# Patient Record
Sex: FEMALE | Race: WHITE | NOT HISPANIC OR LATINO | URBAN - METROPOLITAN AREA
[De-identification: names, ages, dates, MRNs, and addresses within clinical notes are randomized per-mention and may not be internally consistent; named-entity substitution may affect disease eponyms.]

---

## 2018-08-17 ENCOUNTER — HOSPITAL ENCOUNTER (EMERGENCY)
Facility: CLINIC | Age: 58
Discharge: HOME | End: 2018-08-18
Attending: EMERGENCY MEDICINE | Admitting: EMERGENCY MEDICINE
Payer: COMMERCIAL

## 2018-08-17 VITALS
DIASTOLIC BLOOD PRESSURE: 79 MMHG | SYSTOLIC BLOOD PRESSURE: 134 MMHG | BODY MASS INDEX: 32.82 KG/M2 | WEIGHT: 197 LBS | HEIGHT: 65 IN | RESPIRATION RATE: 18 BRPM | TEMPERATURE: 99.7 F | OXYGEN SATURATION: 96 %

## 2018-08-17 DIAGNOSIS — R11.2 NAUSEA AND VOMITING, INTRACTABILITY OF VOMITING NOT SPECIFIED, UNSPECIFIED VOMITING TYPE: ICD-10-CM

## 2018-08-17 DIAGNOSIS — R73.9 HYPERGLYCEMIA: ICD-10-CM

## 2018-08-17 DIAGNOSIS — L03.116 CELLULITIS OF LEFT FOOT: ICD-10-CM

## 2018-08-17 PROCEDURE — 99284 EMERGENCY DEPT VISIT MOD MDM: CPT | Mod: 25

## 2018-08-17 PROCEDURE — 96374 THER/PROPH/DIAG INJ IV PUSH: CPT

## 2018-08-17 PROCEDURE — 96361 HYDRATE IV INFUSION ADD-ON: CPT

## 2018-08-17 RX ORDER — ONDANSETRON 2 MG/ML
4 INJECTION INTRAMUSCULAR; INTRAVENOUS EVERY 30 MIN PRN
Status: DISCONTINUED | OUTPATIENT
Start: 2018-08-17 | End: 2018-08-18 | Stop reason: HOSPADM

## 2018-08-17 ASSESSMENT — ENCOUNTER SYMPTOMS
FEVER: 1
NAUSEA: 1
VOMITING: 0
WOUND: 1

## 2018-08-17 NOTE — ED AVS SNAPSHOT
Emergency Department    [address] [facility] [address]    Phone:  [phone]    Fax:  [phone]                                       Zaira Ahn   MRN: [id number]    Department:   Emergency Department   Date of Visit:  8/17/2018           Patient Information     Date Of Birth          1960        Your diagnoses for this visit were:     Cellulitis of left foot     Nausea and vomiting, intractability of vomiting not specified, unspecified vomiting type     Hyperglycemia        You were seen by Alie Cuenca MD.      Follow-up Information     Follow up with your doctor.        Follow up with  Emergency Department.    Specialty:  EMERGENCY MEDICINE    Why:  If symptoms worsen    Contact information:    [address] [facility] [address] [phone]        Discharge Instructions         Discharge Instructions for Cellulitis  You have been diagnosed with cellulitis. This is an infection in the deepest layer of the skin. In some cases, the infection also affects the muscle. Cellulitis is caused by bacteria. The bacteria can enter the body through broken skin. This can happen with a cut, scratch, animal bite, or an insect bite that has been scratched. You may have been treated in the hospital with antibiotics and fluids. You will likely be given a prescription for antibiotics to take at home. This sheet will help you take care of yourself at home.  Home care  When you are home:    Take the prescribed antibiotic medicine you are given as directed until it is gone. Take it even if you feel better. It treats the infection and stops it from returning. Not taking all the medicine can make future infections hard to treat.    Keep the infected area clean.    When possible, raise the infected area above the level of your heart. This helps keep swelling down.    Talk with your healthcare provider if you are in pain. Ask what kind of over-the-counter medicine you can  take for pain.    Apply clean bandages as advised.    Take your temperature once a day for a week.    Wash your hands often to prevent spreading the infection.  In the future, wash your hands before and after you touch cuts, scratches, or bandages. This will help prevent infection.   When to call your healthcare provider  Call your healthcare provider immediately if you have any of the following:    Difficulty or pain when moving the joints above or below the infected area    Discharge or pus draining from the area    Fever of 100.4 F (38 C) or higher, or as directed by your healthcare provider    Pain that gets worse in or around the infected     Redness that gets worse in or around the infected area, particularly if the area of redness expands to a wider area    Shaking chills    Swelling of the infected area    Vomiting   Date Last Reviewed: 8/1/2016 2000-2017 [employer]. [address] [place] [address]. All rights reserved. This information is not intended as a substitute for professional medical care. Always follow your healthcare professional's instructions.          Diabetes with High Blood Sugar  You have been treated for high blood sugar (hyperglycemia). This may be because of an infection or other illness, eating too many sweets or starches, or not taking enough insulin.  Home care  Monitor and write down your blood sugar level at least twice a day. Do this before breakfast and before dinner. If you take insulin, record your routine insulin dose as well. Also record any additional doses required based on your sliding scale. Do this for the next 3 to 5 days.  High blood sugar may cause symptoms that you can learn to recognize, such as:    Frequent urination    Thirst    Dizziness    Headache    Shortness of breath    Breath that smells fruity    Nausea or vomiting    Abdominal pain    Drowsiness or loss of consciousness  If you have high-blood-sugar symptoms, use a blood or urine  "test to find out what your blood sugar level is. If it is above your usual range, use the \"sliding scale\" regular insulin dose prescribed by your healthcare provider. If you were not given a range for your insulin dose, contact your healthcare provider for more advice.  Follow-up care  Follow up with your healthcare provider, or as advised. You may need to meet with your healthcare provider in the next week. You will likely review your blood sugar records together. You may also talk to your provider about adjusting your dose of insulin or other medicine for blood sugar.  When to seek medical advice  Call your healthcare provider right away if these occur:    High blood sugar symptoms (Symptoms are described above.)    Blood sugar over 300 mg/dl If you can t reach your healthcare provider, go to a hospital emergency room or urgent care center  Date Last Reviewed: 6/1/2016 2000-2017 [employer]. [address] [place] [address]. All rights reserved. This information is not intended as a substitute for professional medical care. Always follow your healthcare professional's instructions.           * VOMITING [6yr-Adult]  Vomiting is a common symptom that may be due to different causes. These include gastroenteritis (\"stomach-flu\"), food poisoning and gastritis. There are other more serious causes of vomiting which may be hard to diagnose early in the illness. Therefore, it is important to watch for the warning signs listed below.  The main danger from repeated vomiting is \"dehydration\". This is due to excess loss of water and minerals from the body. When this occurs, body fluids must be replaced.`  HOME CARE:      If symptoms are severe, rest at home for the next 24 hours.    You may use acetaminophen (Tylenol) 650-1000 mg every 6 hours to control fever, unless another medicine was prescribed. [ NOTE : If you have chronic liver disease, talk with your doctor before using acetaminophen.] " (Aspirin should never be used in anyone under 18 years of age who is ill with a fever. It may cause severe liver damage.)    Avoid tobacco and alcohol use, which may worsen your symptoms.    If medicines for vomiting were prescribed, take as directed.  DURING THE FIRST 12-24 HOURS follow the diet below. Try to take frequent small sips even if you vomit occasionally:    FRUIT JUICES: Apple, grape juice, clear fruit drinks, electrolyte replacement and sports drinks.    BEVERAGES: Sport drinks such as Gatorade, soft drinks without caffeine; mineral water (plain or flavored), decaffeinated tea and coffee.    SOUPS: Clear broth, consommé and bouillon    DESSERTS: Plain gelatin (Jell-O), popsicles and fruit juice bars.  DURING THE NEXT 24 HOURS you may add the following to the above:    Hot cereal, plain toast, bread, rolls, crackers    Plain noodles, rice, mashed potatoes, chicken noodle or rice soup    Unsweetened canned fruit (avoid pineapple), bananas    Avoid dairy products    Limit caffeine and chocolate. No spices or seasonings except salt.  DURING THE NEXT 24 HOURS  Slowly go back to a normal diet, as you feel better and your symptoms lessen.  FOLLOW UP with your doctor as advised if you are not improving over the next 2-3 days.  GET PROMPT MEDICAL ATTENTION if any of the following occur:    Constant abdominal pain that stays in the same spot or gets worse    Continued vomiting (unable to keep liquids down) for 24 hours    Frequent diarrhea (more than 5 times a day); blood (red or black color) in diarrhea    No urine output for 12 hours or extreme thirst    Weakness, dizziness or fainting    Unusually drowsy or confused    Fever over 101.0  F (38.3  C) for more than 3 days    Yellow color of the eyes or skin    1450-0410 [employer]. [address] [place] [address], [place] [address]. All rights reserved. This information is not intended as a substitute for professional medical care. Always follow your  healthcare professional's instructions.  This information has been modified by your health care provider with permission from the publisher.      24 Hour Appointment Hotline       To make an appointment at any [facility], call [phone] ([phone]). If you don't have a family doctor or clinic, we will help you find one. [place] clinics are conveniently located to serve the needs of you and your family.             Review of your medicines      START taking        Dose / Directions Last dose taken    cephALEXin 500 MG capsule   Commonly known as:  KEFLEX   Dose:  500 mg   Quantity:  28 capsule        Take 1 capsule (500 mg) by mouth 4 times daily for 7 days   Refills:  0        ondansetron 4 MG ODT tab   Commonly known as:  ZOFRAN ODT   Dose:  4 mg   Quantity:  10 tablet        Take 1 tablet (4 mg) by mouth every 8 hours as needed   Refills:  0          Our records show that you are taking the medicines listed below. If these are incorrect, please call your family doctor or clinic.        Dose / Directions Last dose taken    atenolol-chlorthalidone 50-25 MG per tablet   Commonly known as:  TENORETIC   Dose:  1 tablet        Take 1 tablet by mouth daily   Refills:  0        buPROPion 150 MG 24 hr tablet   Commonly known as:  WELLBUTRIN XL   Dose:  150 mg   Quantity:  30 tablet        Take 1 tablet (150 mg) by mouth every morning   Refills:  1        GABAPENTIN PO        Refills:  0        METFORMIN HCL PO        Refills:  0                Prescriptions were sent or printed at these locations (2 Prescriptions)                   Other Prescriptions                Printed at Department/Unit printer (2 of 2)         cephALEXin (KEFLEX) 500 MG capsule               ondansetron (ZOFRAN ODT) 4 MG ODT tab                Procedures and tests performed during your visit     CBC with platelets differential    Comprehensive metabolic panel    Foot XR, G/E 3 views, left    Lactic acid whole blood    Lipase       Orders Needing Specimen Collection     None      Pending Results     Date and Time Order Name Status Description    8/17/2018 2357 Foot XR, G/E 3 views, left Preliminary             Pending Culture Results     No orders found for last 3 day(s).            Pending Results Instructions     If you had any lab results that were not finalized at the time of your Discharge, you can call the ED Lab Result RN at [phone]. You will be contacted by this team for any positive Lab results or changes in treatment. The nurses are available 7 days a week from 10A to 6:30P.  You can leave a message 24 hours per day and they will return your call.        Test Results From Your Hospital Stay        8/18/2018 12:17 AM      Component Results     Component Value Ref Range & Units Status    WBC 10.3 4.0 - 11.0 10e9/L Final    RBC Count 4.69 3.8 - 5.2 10e12/L Final    Hemoglobin 15.1 11.7 - 15.7 g/dL Final    Hematocrit 43.2 35.0 - 47.0 % Final    MCV 92 78 - 100 fl Final    MCH 32.2 26.5 - 33.0 pg Final    MCHC 35.0 31.5 - 36.5 g/dL Final    RDW 12.7 10.0 - 15.0 % Final    Platelet Count 202 150 - 450 10e9/L Final    Diff Method Automated Method  Final    % Neutrophils 76.1 % Final    % Lymphocytes 15.2 % Final    % Monocytes 6.4 % Final    % Eosinophils 1.8 % Final    % Basophils 0.3 % Final    % Immature Granulocytes 0.2 % Final    Nucleated RBCs 0 0 /100 Final    Absolute Neutrophil 7.9 1.6 - 8.3 10e9/L Final    Absolute Lymphocytes 1.6 0.8 - 5.3 10e9/L Final    Absolute Monocytes 0.7 0.0 - 1.3 10e9/L Final    Absolute Eosinophils 0.2 0.0 - 0.7 10e9/L Final    Absolute Basophils 0.0 0.0 - 0.2 10e9/L Final    Abs Immature Granulocytes 0.0 0 - 0.4 10e9/L Final    Absolute Nucleated RBC 0.0  Final         8/18/2018 12:38 AM      Component Results     Component Value Ref Range & Units Status    Sodium 137 133 - 144 mmol/L Final    Potassium 3.2 (L) 3.4 - 5.3 mmol/L Final    Chloride 98 94 - 109 mmol/L Final    Carbon Dioxide 30 20 -  32 mmol/L Final    Anion Gap 9 3 - 14 mmol/L Final    Glucose 195 (H) 70 - 99 mg/dL Final    Urea Nitrogen 14 7 - 30 mg/dL Final    Creatinine 0.81 0.52 - 1.04 mg/dL Final    GFR Estimate 72 >60 mL/min/1.7m2 Final    Non  GFR Calc    GFR Estimate If Black 87 >60 mL/min/1.7m2 Final    African American GFR Calc    Calcium 8.6 8.5 - 10.1 mg/dL Final    Bilirubin Total 0.7 0.2 - 1.3 mg/dL Final    Albumin 3.4 3.4 - 5.0 g/dL Final    Protein Total 7.7 6.8 - 8.8 g/dL Final    Alkaline Phosphatase 91 40 - 150 U/L Final    ALT 65 (H) 0 - 50 U/L Final    AST 32 0 - 45 U/L Final         8/18/2018 12:37 AM      Component Results     Component Value Ref Range & Units Status    Lipase 59 (L) 73 - 393 U/L Final         8/18/2018 12:16 AM      Component Results     Component Value Ref Range & Units Status    Lactic Acid 1.7 0.7 - 2.0 mmol/L Final         8/18/2018 12:28 AM      Narrative     LEFT FOOT 3 VIEWS   8/18/2018 12:22 AM     HISTORY: Pain after foreign body removal.    COMPARISON: None.        Impression     IMPRESSION:   1. No radiopaque foreign object is visualized within the soft tissues  of the left foot.  2. No visualized acute fracture, malalignment or other acute osseous  abnormality of the left foot.  3. Surgical fusion hardware present bridging the cuboid-calcaneal  joint.  4. Mild degenerative changes in the left first metatarsophalangeal  joint.                Clinical Quality Measure: Blood Pressure Screening     Your blood pressure was checked while you were in the emergency department today. The last reading we obtained was  BP: 134/79 . Please read the guidelines below about what these numbers mean and what you should do about them.  If your systolic blood pressure (the top number) is less than 120 and your diastolic blood pressure (the bottom number) is less than 80, then your blood pressure is normal. There is nothing more that you need to do about it.  If your systolic blood pressure (the  "top number) is 120-139 or your diastolic blood pressure (the bottom number) is 80-89, your blood pressure may be higher than it should be. You should have your blood pressure rechecked within a year by a primary care provider.  If your systolic blood pressure (the top number) is 140 or greater or your diastolic blood pressure (the bottom number) is 90 or greater, you may have high blood pressure. High blood pressure is treatable, but if left untreated over time it can put you at risk for heart attack, stroke, or kidney failure. You should have your blood pressure rechecked by a primary care provider within the next 4 weeks.  If your provider in the emergency department today gave you specific instructions to follow-up with your doctor or provider even sooner than that, you should follow that instruction and not wait for up to 4 weeks for your follow-up visit.        Thank you for choosing [facility]       Thank you for choosing [facility] for your care. Our goal is always to provide you with excellent care. Hearing back from our patients is one way we can continue to improve our services. Please take a few minutes to complete the written survey that you may receive in the mail after you visit with us. Thank you!        [employer] Information     [employer] lets you send messages to your doctor, view your test results, renew your prescriptions, schedule appointments and more. To sign up, go to [email or web address] . Click on \"Log in\" on the left side of the screen, which will take you to the Welcome page. Then click on \"Sign up Now\" on the right side of the page.     You will be asked to enter the access code listed below, as well as some personal information. Please follow the directions to create your username and password.     Your access code is: [email or web address]  Expires: 2018 12:57 AM     Your access code will  in 90 days. If you need help or a new code, please call your [facility] clinic or [phone].      "   Care EveryWhere ID     This is your Care EveryWhere ID. This could be used by other organizations to access your [place] medical records  [id number]        Equal Access to Services     LUIS BROWN : Berta Gomez, arpit vuong, elliot wood, tamanna herrera. So [facility] [place] [phone].    ATENCIÓN: Si habla español, tiene a cortés disposición servicios gratuitos de asistencia lingüística. Llame al [phone].    We comply with applicable federal civil rights laws and [place] laws. We do not discriminate on the basis of race, color, national origin, age, disability, sex, sexual orientation, or gender identity.            After Visit Summary       This is your record. Keep this with you and show to your community pharmacist(s) and doctor(s) at your next visit.

## 2018-08-18 ENCOUNTER — APPOINTMENT (OUTPATIENT)
Dept: GENERAL RADIOLOGY | Facility: CLINIC | Age: 58
End: 2018-08-18
Attending: EMERGENCY MEDICINE
Payer: COMMERCIAL

## 2018-08-18 LAB
ALBUMIN SERPL-MCNC: 3.4 G/DL (ref 3.4–5)
ALP SERPL-CCNC: 91 U/L (ref 40–150)
ALT SERPL W P-5'-P-CCNC: 65 U/L (ref 0–50)
ANION GAP SERPL CALCULATED.3IONS-SCNC: 9 MMOL/L (ref 3–14)
AST SERPL W P-5'-P-CCNC: 32 U/L (ref 0–45)
BASOPHILS # BLD AUTO: 0 10E9/L (ref 0–0.2)
BASOPHILS NFR BLD AUTO: 0.3 %
BILIRUB SERPL-MCNC: 0.7 MG/DL (ref 0.2–1.3)
BUN SERPL-MCNC: 14 MG/DL (ref 7–30)
CALCIUM SERPL-MCNC: 8.6 MG/DL (ref 8.5–10.1)
CHLORIDE SERPL-SCNC: 98 MMOL/L (ref 94–109)
CO2 SERPL-SCNC: 30 MMOL/L (ref 20–32)
CREAT SERPL-MCNC: 0.81 MG/DL (ref 0.52–1.04)
DIFFERENTIAL METHOD BLD: NORMAL
EOSINOPHIL # BLD AUTO: 0.2 10E9/L (ref 0–0.7)
EOSINOPHIL NFR BLD AUTO: 1.8 %
ERYTHROCYTE [DISTWIDTH] IN BLOOD BY AUTOMATED COUNT: 12.7 % (ref 10–15)
GFR SERPL CREATININE-BSD FRML MDRD: 72 ML/MIN/1.7M2
GLUCOSE SERPL-MCNC: 195 MG/DL (ref 70–99)
HCT VFR BLD AUTO: 43.2 % (ref 35–47)
HGB BLD-MCNC: 15.1 G/DL (ref 11.7–15.7)
IMM GRANULOCYTES # BLD: 0 10E9/L (ref 0–0.4)
IMM GRANULOCYTES NFR BLD: 0.2 %
LACTATE BLD-SCNC: 1.7 MMOL/L (ref 0.7–2)
LIPASE SERPL-CCNC: 59 U/L (ref 73–393)
LYMPHOCYTES # BLD AUTO: 1.6 10E9/L (ref 0.8–5.3)
LYMPHOCYTES NFR BLD AUTO: 15.2 %
MCH RBC QN AUTO: 32.2 PG (ref 26.5–33)
MCHC RBC AUTO-ENTMCNC: 35 G/DL (ref 31.5–36.5)
MCV RBC AUTO: 92 FL (ref 78–100)
MONOCYTES # BLD AUTO: 0.7 10E9/L (ref 0–1.3)
MONOCYTES NFR BLD AUTO: 6.4 %
NEUTROPHILS # BLD AUTO: 7.9 10E9/L (ref 1.6–8.3)
NEUTROPHILS NFR BLD AUTO: 76.1 %
NRBC # BLD AUTO: 0 10*3/UL
NRBC BLD AUTO-RTO: 0 /100
PLATELET # BLD AUTO: 202 10E9/L (ref 150–450)
POTASSIUM SERPL-SCNC: 3.2 MMOL/L (ref 3.4–5.3)
PROT SERPL-MCNC: 7.7 G/DL (ref 6.8–8.8)
RBC # BLD AUTO: 4.69 10E12/L (ref 3.8–5.2)
SODIUM SERPL-SCNC: 137 MMOL/L (ref 133–144)
WBC # BLD AUTO: 10.3 10E9/L (ref 4–11)

## 2018-08-18 PROCEDURE — 83690 ASSAY OF LIPASE: CPT | Performed by: EMERGENCY MEDICINE

## 2018-08-18 PROCEDURE — 96374 THER/PROPH/DIAG INJ IV PUSH: CPT

## 2018-08-18 PROCEDURE — 73630 X-RAY EXAM OF FOOT: CPT | Mod: LT

## 2018-08-18 PROCEDURE — 85025 COMPLETE CBC W/AUTO DIFF WBC: CPT | Performed by: EMERGENCY MEDICINE

## 2018-08-18 PROCEDURE — 80053 COMPREHEN METABOLIC PANEL: CPT | Performed by: EMERGENCY MEDICINE

## 2018-08-18 PROCEDURE — 96361 HYDRATE IV INFUSION ADD-ON: CPT

## 2018-08-18 PROCEDURE — 25000128 H RX IP 250 OP 636: Performed by: EMERGENCY MEDICINE

## 2018-08-18 PROCEDURE — 83605 ASSAY OF LACTIC ACID: CPT | Performed by: EMERGENCY MEDICINE

## 2018-08-18 RX ORDER — CEPHALEXIN 500 MG/1
500 CAPSULE ORAL 4 TIMES DAILY
Qty: 28 CAPSULE | Refills: 0 | Status: SHIPPED | OUTPATIENT
Start: 2018-08-18 | End: 2018-08-25

## 2018-08-18 RX ORDER — ONDANSETRON 4 MG/1
4 TABLET, ORALLY DISINTEGRATING ORAL EVERY 8 HOURS PRN
Qty: 10 TABLET | Refills: 0 | Status: SHIPPED | OUTPATIENT
Start: 2018-08-18 | End: 2018-08-21

## 2018-08-18 RX ADMIN — ONDANSETRON 4 MG: 2 INJECTION INTRAMUSCULAR; INTRAVENOUS at 00:10

## 2018-08-18 RX ADMIN — SODIUM CHLORIDE 1000 ML: 9 INJECTION, SOLUTION INTRAVENOUS at 00:08

## 2018-08-18 NOTE — ED PROVIDER NOTES
"  History     Chief Complaint:  Wound Check    HPI   Zaira Ahn is a 57 year old female with a history of diabetes and hypertension who presents to the ED for a wound check. The patient reports that she had a sliver in her left foot, and removed it with a sewing needle and tweezers 5 days ago. Since the then, she has had increased pain to the area, and developed some nausea throughout the day today. She also had a fever of 101, so she presented to the ED. Here in the ED, she denies any vomiting. She additionally notes that her blood sugars have been high recently, in the 200's. She does note that she has had exposure to her children who have been ill with similar symptoms.    Allergies:  Tylenol with Codeine    Medications:    Tenoretic  Gabapentin  Metformin  Wellbutrin    Past Medical History:    Arthritis  Depression  Diabetes  HTN    Past Surgical History:    Orthopedic surgery    Family History:    Mother: diabetes, HTN  Father: CAD  Brother: CAD  Son: asthma    Social History:  Marital Status:   [2]  Current Smoker  Alcohol use: yes     Review of Systems   Constitutional: Positive for fever.   Gastrointestinal: Positive for nausea. Negative for vomiting.   Skin: Positive for wound.   All other systems reviewed and are negative.    Physical Exam     Patient Vitals for the past 24 hrs:   BP Temp Temp src Heart Rate Resp SpO2 Height Weight   08/17/18 2307 134/79 99.7  F (37.6  C) Oral 88 18 96 % 1.651 m (5' 5\") 89.4 kg (197 lb)     Physical Exam  General: Patient is alert and normal appearing.  HEENT: Head atraumatic    Eyes: pupils equal and reactive. Conjunctiva clear   Nares: patent   Oropharynx: no lesions, uvula midline, no palatal draping, normal voice, no trismus  Neck: Supple without lymphadenopathy, no meningismus  Chest: Heart regular rate and rhythm.   Lungs: Equal clear to auscultation with no wheeze or rales  Abdomen: Soft, non tender, nondistended, normal bowel sounds  Back: No " costovertebral angle tenderness, no midline C, T or L spine tenderness  Neuro: Grossly nonfocal, normal speech, strength equal bilaterally, CN 2-12 intact  Extremities: No deformities, equal radial and DP pulses. No clubbing, cyanosis.  No edema  Skin: Warm and dry.  Anterior aspect of left foot with small wound at the site where she removed foreign body no palpable foreign body remains and no ulcer is noted.  There is surrounding erythema no drainage.  Toes are warm and well-perfused she has 2+ DP pulses.  Compartments on that leg are soft.      Emergency Department Course   Imaging:  Radiographic findings were communicated with the patient who voiced understanding of the findings.  XR Foot 3 views, left:   1. No radiopaque foreign object is visualized within the soft tissues of the left foot.  2. No visualized acute fracture, malalignment or other acute osseous abnormality of the left foot.  3. Surgical fusion hardware present bridging the cuboid-calcaneal joint.  4. Mild degenerative changes in the left first metatarsophalangeal joint, as per radiology.     Laboratory:  CBC: WBC: 10.3, HGB: 15.1, PLT: 202  CMP: Glucose 195 (H), Potassium 3.2 (L), ALT 65 (H), o/w WNL (Creatinine: 0.81)    Lipase: 59 (L)    0007 Lactic acid: 1.7    Interventions:  0008 NS 1L IV  0010 Zofran, 4 mg, IV injection    Emergency Department Course:  Nursing notes and vitals reviewed. (7485) I performed an exam of the patient as documented above.     IV inserted. Medicine administered as documented above. Blood drawn. This was sent to the lab for further testing, results above.    The patient was sent for a Foot XR while in the emergency department, findings above.     (9593) I rechecked the patient and discussed the results of her workup thus far.     Findings and plan explained to the Patient. Patient discharged home with instructions regarding supportive care, medications, and reasons to return. The importance of close follow-up was  reviewed. The patient was prescribed Keflex & Zofran.    I personally reviewed the laboratory results with the Patient and answered all related questions prior to discharge.     Impression & Plan      Medical Decision Making:  Zaira Ahn is a 57 year old female pain of her left foot following foreign body removal 3 days ago as well as nausea and vomiting.  Patient had exposure to 2 grandchildren with viral gastroenteritis and started having nausea very severely today.  Her last granddaughter became ill 2 days ago.  She denies diarrhea or abdominal pain.  Patient also had mildly elevated blood sugars.  She has a history of type 2 diabetes and has been taking her medications.  There is no evidence on x-ray to suggest necrotizing infection nor osteomyelitis.  There is no retained foreign body that is identified.  There is no ulcer of her foot at this time but some mild surrounding cellulitis therefore she will be placed on Keflex.  She encouraged to use warm baths and keep it clean and dry otherwise.  I think her nausea and vomiting is likely related to viral gastroenteritis. she felt much improved after Zofran here.  There is no evidence for sepsis including a normal white blood cell count, normal lactic acid and no signs of acidosis her BMP to suggest DKA.  Patient's recommended to return if she has fevers increased nausea and vomiting over the next 2 days ulceration develops, drainage from the wound, spreading redness of her foot.  Patient was agreeable with the plan and all questions and concerns addressed.    Diagnosis:    ICD-10-CM    1. Cellulitis of left foot L03.116    2. Nausea and vomiting, intractability of vomiting not specified, unspecified vomiting type R11.2    3. Hyperglycemia R73.9      Disposition:  discharged to home    Discharge Medications:  New Prescriptions    CEPHALEXIN (KEFLEX) 500 MG CAPSULE    Take 1 capsule (500 mg) by mouth 4 times daily for 7 days    ONDANSETRON (ZOFRAN ODT) 4 MG  ODT TAB    Take 1 tablet (4 mg) by mouth every 8 hours as needed     Scribe Disclosure:  I, Sussy Rivera, am serving as a scribe on 8/17/2018 at 11:40 PM to personally document services performed by Alie Cuenca MD based on my observations and the provider's statements to me.     Sussy Rivera  8/17/2018    EMERGENCY DEPARTMENT       Alie Cuenca MD  08/18/18 0137

## 2018-08-18 NOTE — DISCHARGE INSTRUCTIONS
Discharge Instructions for Cellulitis  You have been diagnosed with cellulitis. This is an infection in the deepest layer of the skin. In some cases, the infection also affects the muscle. Cellulitis is caused by bacteria. The bacteria can enter the body through broken skin. This can happen with a cut, scratch, animal bite, or an insect bite that has been scratched. You may have been treated in the hospital with antibiotics and fluids. You will likely be given a prescription for antibiotics to take at home. This sheet will help you take care of yourself at home.  Home care  When you are home:    Take the prescribed antibiotic medicine you are given as directed until it is gone. Take it even if you feel better. It treats the infection and stops it from returning. Not taking all the medicine can make future infections hard to treat.    Keep the infected area clean.    When possible, raise the infected area above the level of your heart. This helps keep swelling down.    Talk with your healthcare provider if you are in pain. Ask what kind of over-the-counter medicine you can take for pain.    Apply clean bandages as advised.    Take your temperature once a day for a week.    Wash your hands often to prevent spreading the infection.  In the future, wash your hands before and after you touch cuts, scratches, or bandages. This will help prevent infection.   When to call your healthcare provider  Call your healthcare provider immediately if you have any of the following:    Difficulty or pain when moving the joints above or below the infected area    Discharge or pus draining from the area    Fever of 100.4 F (38 C) or higher, or as directed by your healthcare provider    Pain that gets worse in or around the infected     Redness that gets worse in or around the infected area, particularly if the area of redness expands to a wider area    Shaking chills    Swelling of the infected area    Vomiting   Date Last Reviewed:  "8/1/2016 2000-2017 [employer]. [address] [place] [address], [place] [address]. All rights reserved. This information is not intended as a substitute for professional medical care. Always follow your healthcare professional's instructions.          Diabetes with High Blood Sugar  You have been treated for high blood sugar (hyperglycemia). This may be because of an infection or other illness, eating too many sweets or starches, or not taking enough insulin.  Home care  Monitor and write down your blood sugar level at least twice a day. Do this before breakfast and before dinner. If you take insulin, record your routine insulin dose as well. Also record any additional doses required based on your sliding scale. Do this for the next 3 to 5 days.  High blood sugar may cause symptoms that you can learn to recognize, such as:    Frequent urination    Thirst    Dizziness    Headache    Shortness of breath    Breath that smells fruity    Nausea or vomiting    Abdominal pain    Drowsiness or loss of consciousness  If you have high-blood-sugar symptoms, use a blood or urine test to find out what your blood sugar level is. If it is above your usual range, use the \"sliding scale\" regular insulin dose prescribed by your healthcare provider. If you were not given a range for your insulin dose, contact your healthcare provider for more advice.  Follow-up care  Follow up with your healthcare provider, or as advised. You may need to meet with your healthcare provider in the next week. You will likely review your blood sugar records together. You may also talk to your provider about adjusting your dose of insulin or other medicine for blood sugar.  When to seek medical advice  Call your healthcare provider right away if these occur:    High blood sugar symptoms (Symptoms are described above.)    Blood sugar over 300 mg/dl If you can t reach your healthcare provider, go to a hospital emergency room or urgent care " "center  Date Last Reviewed: 6/1/2016 2000-2017 [employer]. [address] [place] [address], [place] [address]. All rights reserved. This information is not intended as a substitute for professional medical care. Always follow your healthcare professional's instructions.           * VOMITING [6yr-Adult]  Vomiting is a common symptom that may be due to different causes. These include gastroenteritis (\"stomach-flu\"), food poisoning and gastritis. There are other more serious causes of vomiting which may be hard to diagnose early in the illness. Therefore, it is important to watch for the warning signs listed below.  The main danger from repeated vomiting is \"dehydration\". This is due to excess loss of water and minerals from the body. When this occurs, body fluids must be replaced.`  HOME CARE:      If symptoms are severe, rest at home for the next 24 hours.    You may use acetaminophen (Tylenol) 650-1000 mg every 6 hours to control fever, unless another medicine was prescribed. [ NOTE : If you have chronic liver disease, talk with your doctor before using acetaminophen.] (Aspirin should never be used in anyone under 18 years of age who is ill with a fever. It may cause severe liver damage.)    Avoid tobacco and alcohol use, which may worsen your symptoms.    If medicines for vomiting were prescribed, take as directed.  DURING THE FIRST 12-24 HOURS follow the diet below. Try to take frequent small sips even if you vomit occasionally:    FRUIT JUICES: Apple, grape juice, clear fruit drinks, electrolyte replacement and sports drinks.    BEVERAGES: Sport drinks such as Gatorade, soft drinks without caffeine; mineral water (plain or flavored), decaffeinated tea and coffee.    SOUPS: Clear broth, consommé and bouillon    DESSERTS: Plain gelatin (Jell-O), popsicles and fruit juice bars.  DURING THE NEXT 24 HOURS you may add the following to the above:    Hot cereal, plain toast, bread, rolls, crackers    Plain " noodles, rice, mashed potatoes, chicken noodle or rice soup    Unsweetened canned fruit (avoid pineapple), bananas    Avoid dairy products    Limit caffeine and chocolate. No spices or seasonings except salt.  DURING THE NEXT 24 HOURS  Slowly go back to a normal diet, as you feel better and your symptoms lessen.  FOLLOW UP with your doctor as advised if you are not improving over the next 2-3 days.  GET PROMPT MEDICAL ATTENTION if any of the following occur:    Constant abdominal pain that stays in the same spot or gets worse    Continued vomiting (unable to keep liquids down) for 24 hours    Frequent diarrhea (more than 5 times a day); blood (red or black color) in diarrhea    No urine output for 12 hours or extreme thirst    Weakness, dizziness or fainting    Unusually drowsy or confused    Fever over 101.0  F (38.3  C) for more than 3 days    Yellow color of the eyes or skin    7826-4501 [employer]. [address] [place] [address], [place] [address]. All rights reserved. This information is not intended as a substitute for professional medical care. Always follow your healthcare professional's instructions.  This information has been modified by your health care provider with permission from the publisher.

## 2019-04-25 ENCOUNTER — OFFICE VISIT (OUTPATIENT)
Dept: FAMILY MEDICINE | Facility: CLINIC | Age: 59
End: 2019-04-25
Payer: COMMERCIAL

## 2019-04-25 VITALS
OXYGEN SATURATION: 98 % | SYSTOLIC BLOOD PRESSURE: 152 MMHG | DIASTOLIC BLOOD PRESSURE: 102 MMHG | WEIGHT: 192 LBS | HEART RATE: 78 BPM | BODY MASS INDEX: 31.99 KG/M2 | HEIGHT: 65 IN

## 2019-04-25 DIAGNOSIS — F41.8 DEPRESSION WITH ANXIETY: ICD-10-CM

## 2019-04-25 DIAGNOSIS — E11.9 TYPE 2 DIABETES MELLITUS WITHOUT COMPLICATION, WITHOUT LONG-TERM CURRENT USE OF INSULIN (H): Primary | ICD-10-CM

## 2019-04-25 DIAGNOSIS — Z13.9 SCREENING FOR CONDITION: ICD-10-CM

## 2019-04-25 DIAGNOSIS — Z71.6 ENCOUNTER FOR SMOKING CESSATION COUNSELING: ICD-10-CM

## 2019-04-25 DIAGNOSIS — I10 ESSENTIAL HYPERTENSION: ICD-10-CM

## 2019-04-25 DIAGNOSIS — E11.41 DIABETIC MONONEUROPATHY ASSOCIATED WITH TYPE 2 DIABETES MELLITUS (H): ICD-10-CM

## 2019-04-25 LAB
BUN SERPL-MCNC: 10.4 MG/DL (ref 7–19)
CALCIUM SERPL-MCNC: 9.5 MG/DL (ref 8.5–10.1)
CHLORIDE SERPLBLD-SCNC: 102.6 MMOL/L (ref 98–110)
CHOLEST SERPL-MCNC: 231.5 MG/DL (ref 0–200)
CHOLEST/HDLC SERPL: 5.9 {RATIO} (ref 0–5)
CO2 SERPL-SCNC: 31.1 MMOL/L (ref 20–32)
CREAT SERPL-MCNC: 0.5 MG/DL (ref 0.5–1)
GFR SERPL CREATININE-BSD FRML MDRD: >90 ML/MIN/1.7 M2
GLUCOSE SERPL-MCNC: 193 MG'DL (ref 70–99)
HBA1C MFR BLD: 7.8 % (ref 4.1–5.7)
HDLC SERPL-MCNC: 39.1 MG/DL
HIV 1+2 AB+HIV1 P24 AG SERPL QL IA: NONREACTIVE
LDLC SERPL CALC-MCNC: 138 MG/DL (ref 0–129)
POTASSIUM SERPL-SCNC: 4.2 MMOL/DL (ref 3.3–4.5)
SODIUM SERPL-SCNC: 134.2 MMOL/L (ref 132.6–141.4)
TRIGL SERPL-MCNC: 274.4 MG/DL (ref 0–150)
TSH SERPL DL<=0.005 MIU/L-ACNC: 2.4 MU/L (ref 0.4–4)
VLDL CHOLESTEROL: 54.9 MG/DL (ref 7–32)

## 2019-04-25 RX ORDER — SERTRALINE HYDROCHLORIDE 25 MG/1
25 TABLET, FILM COATED ORAL DAILY
Qty: 90 TABLET | Refills: 1 | Status: SHIPPED | OUTPATIENT
Start: 2019-04-25 | End: 2019-12-05

## 2019-04-25 RX ORDER — GABAPENTIN 300 MG/1
300 CAPSULE ORAL 2 TIMES DAILY
Qty: 180 CAPSULE | Refills: 1 | Status: SHIPPED | OUTPATIENT
Start: 2019-04-25 | End: 2019-10-31

## 2019-04-25 RX ORDER — SERTRALINE HYDROCHLORIDE 25 MG/1
25 TABLET, FILM COATED ORAL DAILY
COMMUNITY
Start: 2018-04-11 | End: 2019-04-25

## 2019-04-25 RX ORDER — ATENOLOL AND CHLORTHALIDONE TABLET 50; 25 MG/1; MG/1
1 TABLET ORAL DAILY
Qty: 90 TABLET | Refills: 3 | Status: SHIPPED | OUTPATIENT
Start: 2019-04-25 | End: 2019-12-05

## 2019-04-25 RX ORDER — NICOTINE 21 MG/24HR
1 PATCH, TRANSDERMAL 24 HOURS TRANSDERMAL EVERY 24 HOURS
Qty: 30 PATCH | Refills: 1 | Status: SHIPPED | OUTPATIENT
Start: 2019-04-25 | End: 2019-12-05

## 2019-04-25 RX ORDER — LIRAGLUTIDE 6 MG/ML
1.8 INJECTION SUBCUTANEOUS DAILY
Qty: 1.8 ML | Refills: 1 | Status: SHIPPED | OUTPATIENT
Start: 2019-04-25 | End: 2019-12-05

## 2019-04-25 RX ORDER — ATORVASTATIN CALCIUM 40 MG/1
40 TABLET, FILM COATED ORAL DAILY
Qty: 90 TABLET | Refills: 3 | Status: SHIPPED | OUTPATIENT
Start: 2019-04-25 | End: 2019-12-05

## 2019-04-25 ASSESSMENT — ENCOUNTER SYMPTOMS
DIFFICULTY URINATING: 0
PALPITATIONS: 0
ABDOMINAL DISTENTION: 0
ABDOMINAL PAIN: 0
CONSTIPATION: 0
SHORTNESS OF BREATH: 0
COUGH: 0
SLEEP DISTURBANCE: 0
DYSPHORIC MOOD: 0
DYSURIA: 0
EYES NEGATIVE: 1
DIARRHEA: 1
CHEST TIGHTNESS: 0
POLYDIPSIA: 0
MYALGIAS: 0
FATIGUE: 0
VOMITING: 0
ARTHRALGIAS: 0
NUMBNESS: 0
FEVER: 0
BLOOD IN STOOL: 0
NERVOUS/ANXIOUS: 0
COLOR CHANGE: 0

## 2019-04-25 ASSESSMENT — MIFFLIN-ST. JEOR: SCORE: 1455.75

## 2019-04-26 NOTE — PATIENT INSTRUCTIONS
Here is the plan from today's visit    1. Type 2 diabetes mellitus without complication, without long-term current use of insulin (H)  Please try to send her medication will report the results of the labs shortly.  - Hemoglobin A1c ([facility])  - Lipid Cascade ([facility])  - TSH with free T4 reflex  - Basic Metabolic Panel ([facility])  - atorvastatin (LIPITOR) 40 MG tablet; Take 1 tablet (40 mg) by mouth daily  Dispense: 90 tablet; Refill: 3  - liraglutide (VICTOZA) 18 MG/3ML solution; Inject 1.8 mg Subcutaneous daily Start 0.6 for one week, 1.2 for one week  Dispense: 1.8 mL; Refill: 1    2. Screening for condition    - HIV Antigen Antibody Combo    3. Diabetic mononeuropathy associated with type 2 diabetes mellitus (H)    - gabapentin (NEURONTIN) 300 MG capsule; Take 1 capsule (300 mg) by mouth 2 times daily  Dispense: 180 capsule; Refill: 1    4. Essential hypertension    - atenolol-chlorthalidone (TENORETIC) 50-25 MG tablet; Take 1 tablet by mouth daily  Dispense: 90 tablet; Refill: 3    5. Depression with anxiety  Start taking this at the follow-up with we will see if his thyroid dose with decreasing her dosages.  - sertraline (ZOLOFT) 25 MG tablet; Take 1 tablet (25 mg) by mouth daily  Dispense: 90 tablet; Refill: 1    6. Encounter for smoking cessation counseling  Support here to decide on a quit date and to follow through with that.  There is unsafe to use a nicotine patch and to smoke at the same time.  - nicotine (NICODERM CQ) 14 MG/24HR 24 hr patch; Place 1 patch onto the skin every 24 hours  Dispense: 30 patch; Refill: 1      Please call or return to clinic if your symptoms don't go away.    Follow up plan  Please make a clinic appointment for follow up with me (TORY WINN) in 2-4  weeks for recheck preventive exam..    Thank you for coming to [facility] today.  Lab Testing:  **If you had lab testing today and your results are reassuring or normal they will be mailed to you or sent through [email or web address]  within 7 days.   **If the lab tests need quick action we will call you with the results.  The phone number we will call with results is # [phone] (home) . If this is not the best number please call our clinic and change the number.  Medication Refills:  If you need any refills please call your pharmacy and they will contact us.   If you need to  your refill at a new pharmacy, please contact the new pharmacy directly. The new pharmacy will help you get your medications transferred faster.   Scheduling:  If you have any concerns about today's visit or wish to schedule another appointment please call our office during normal business hours [phone] (8-5:00 M-F)  If a referral was made to a [facility] and you don't get a call from central scheduling please call [phone].  If a Mammogram was ordered for you at [facility] call [phone] to schedule or change your appointment.  If you had an XRay/CT/Ultrasound/MRI ordered the number is [phone] to schedule or change your radiology appointment.   Medical Concerns:  If you have urgent medical concerns please call [phone] at any time of the day.    Wei Brasher MD

## 2019-04-26 NOTE — PROGRESS NOTES
Zaira is a 58 year old  who presents for   Patient presents with:  Establish Care      Assessment and Plan        1. Type 2 diabetes mellitus without complication, without long-term current use of insulin (H)  Please try to send her medication will report the results of the labs shortly.  - Hemoglobin A1c ([facility])  - Lipid Cascade ([facility])  - TSH with free T4 reflex  - Basic Metabolic Panel ([facility])  Significant discussion about metformin which patient has tried for some years and continues to have significant diarrhea from an a different medication patient at this point it is refusing to continue with metformin so will start liraglutide.  - atorvastatin (LIPITOR) 40 MG tablet; Take 1 tablet (40 mg) by mouth daily  Dispense: 90 tablet; Refill: 3  - liraglutide (VICTOZA) 18 MG/3ML solution; Inject 1.8 mg Subcutaneous daily Start 0.6 for one week, 1.2 for one week  Dispense: 1.8 mL; Refill: 1    2. Screening for condition    - HIV Antigen Antibody Combo    3. Diabetic mononeuropathy associated with type 2 diabetes mellitus (H)    - gabapentin (NEURONTIN) 300 MG capsule; Take 1 capsule (300 mg) by mouth 2 times daily  Dispense: 180 capsule; Refill: 1    4. Essential hypertension    - atenolol-chlorthalidone (TENORETIC) 50-25 MG tablet; Take 1 tablet by mouth daily  Dispense: 90 tablet; Refill: 3    5. Depression with anxiety  Start taking this at the follow-up with we will see if his thyroid dose with decreasing her dosages.  - sertraline (ZOLOFT) 25 MG tablet; Take 1 tablet (25 mg) by mouth daily  Dispense: 90 tablet; Refill: 1    6. Encounter for smoking cessation counseling  Support here to decide on a quit date and to follow through with that.  There is unsafe to use a nicotine patch and to smoke at the same time.  - nicotine (NICODERM CQ) 14 MG/24HR 24 hr patch; Place 1 patch onto the skin every 24 hours  Dispense: 30 patch; Refill: 1      Please call or retun to clinic if your symptoms don't go  away.    Follow up plan  Please make a clinic appointment for follow up with me (WEI BRASHER) in 2-4  weeks for recheck preventive exam..           Return in about 2 weeks (around 5/9/2019).    Options for treatment and follow-up care were reviewed with the patient. Zaira Ahn  engaged in the decision making process and verbalized understanding of the options discussed and agreed with the final plan.    Wei Brasher MD         HPI       Zaira is a 58 year old  who presents for   Patient presents with:  Establish Care      Visit [place]  1.  Patient is here to establish care she is referred by her son.  She is looking for a new primary provider as her other provider retired.  She has hypertension diabetes and depression.  She is also a smoker.      Diabetes Follow-up  <7.0    Patient is checking blood sugars: rarely.  Results range from 150s to 200s         -Last A1C was   Lab Results   Component Value Date    A1C 7.8 04/25/2019    A1C 6.3 08/26/2014        Diabetic concerns: None and blood sugar frequently over 200    Chest Pain or exercise related calf pain (claudication):no     Symptoms of hypoglycemia (low blood sugar): none     Paresthesias (numbness or burning in feet) or sores: No     Diabetic eye exam within the last year?: No    Hyperlipidemia Follow-Up      Recommended Level of Therapy:High Intensity Statin (atorvastatin 40*-80 mg or rosuvastatin 20-40mg)    Rate your low fat/cholesterol diet?: fair    Taking statin?  Stopped due to concerns about side effects.  Though she is willing to resume.    Other lipid medications/supplements?:  none  Lab Results   Component Value Date    CHOL 231.5 04/25/2019     Lab Results   Component Value Date    HDL 39.1 04/25/2019     Lab Results   Component Value Date     04/25/2019     Lab Results   Component Value Date    TRIG 274.4 04/25/2019     Lab Results   Component Value Date    CHOLHDLRATIO 5.9 04/25/2019       Hypertension Follow-up      <140/90    Outpatient blood pressures are not being checked.    Low Salt Diet: not monitoring salt    Daily NSAID Use?no     Did patient take their HTN pills today?  No  Last Basic Metabolic Panel:  Lab Results   Component Value Date    .2 04/25/2019      Lab Results   Component Value Date    POTASSIUM 4.2 04/25/2019     Lab Results   Component Value Date    CHLORIDE 102.6 04/25/2019     Lab Results   Component Value Date    DEVAUGHN 9.5 04/25/2019     Lab Results   Component Value Date    CO2 31.1 04/25/2019     Lab Results   Component Value Date    BUN 10.4 04/25/2019     Lab Results   Component Value Date    CR 0.5 04/25/2019     Lab Results   Component Value Date    .0 04/25/2019         Adherence and Exercise  Medication side effects: yes: diarrhea  How often is a medication missed?  Patient is taking very low dose of metformin because of the diarrhea she also has not been taking her antidepressants or her hypertensive medications because she ran out.  Exercise:walking at work    Patient is   a new patient to this clinic and so  I reviewed/updated the Past Medical History, the Family History and the Social History   Past Medical History:   Diagnosis Date     Arthritis      Diabetes (H)      Hypertension      Family History   Problem Relation Age of Onset     Alcoholism Mother      C.A.D. Father      Alcoholism Father      C.A.D. Brother      Alcoholism Brother      Asthma Son      Hypertension Maternal Grandmother      Cerebrovascular Disease No family hx of      Cancer No family hx of      Social History     Socioeconomic History     Marital status:      Spouse name: None     Number of children: None     Years of education: None     Highest education level: None   Occupational History     None   Social Needs     Financial resource strain: None     Food insecurity:     Worry: None     Inability: None     Transportation needs:     Medical: None     Non-medical: None   Tobacco Use     Smoking  "status: Current Every Day Smoker     Smokeless tobacco: Never Used   Substance and Sexual Activity     Alcohol use: Yes     Drug use: No     Sexual activity: None   Lifestyle     Physical activity:     Days per week: None     Minutes per session: None     Stress: None   Relationships     Social connections:     Talks on phone: None     Gets together: None     Attends Restoration[employer] service: None     Active member of club or organization: None     Attends meetings of clubs or organizations: None     Relationship status: None     Intimate partner violence:     Fear of current or ex partner: None     Emotionally abused: None     Physically abused: None     Forced sexual activity: None   Other Topics Concern     None   Social History Narrative     None   .    Reviewed and updated as needed this visit by clinical staff  Tobacco  Allergies       Reviewed and updated as needed this visit by Provider         Review of Systems:   Review of Systems   Constitutional: Negative for fatigue and fever.   HENT: Negative.    Eyes: Negative.  Negative for visual disturbance.   Respiratory: Negative for cough, chest tightness and shortness of breath.    Cardiovascular: Negative for chest pain and palpitations.   Gastrointestinal: Positive for diarrhea. Negative for abdominal distention, abdominal pain, blood in stool, constipation and vomiting.   Endocrine: Negative for polydipsia and polyuria.   Genitourinary: Negative for difficulty urinating and dysuria.   Musculoskeletal: Negative for arthralgias and myalgias.   Skin: Negative for color change and rash.   Neurological: Negative for numbness.   Psychiatric/Behavioral: Negative for dysphoric mood and sleep disturbance. The patient is not nervous/anxious.             Physical Exam:     Vitals:    04/25/19 0859 04/25/19 0903   BP: (!) 166/109 (!) 152/102   Pulse: 78    SpO2: 98%    Weight: 87.1 kg (192 lb)    Height: 1.657 m (5' 5.25\")      Body mass index is 31.71 kg/m .  Vital signs " normal except elevated blood pressure which was as expected as patient has not taken her blood pressure medication.  Physical Exam   Constitutional: She is oriented to person, place, and time. She appears well-developed. No distress.   HENT:   Head: Normocephalic.   Eyes: Conjunctivae are normal. No scleral icterus.   Neck: Normal range of motion. No thyromegaly present.   Cardiovascular: Normal rate, regular rhythm and normal heart sounds.   No murmur heard.  Pulmonary/Chest: Effort normal and breath sounds normal. No respiratory distress. She has no wheezes.   Abdominal: Soft. Bowel sounds are normal. She exhibits no distension. There is no splenomegaly or hepatomegaly. There is no tenderness.   Musculoskeletal: She exhibits no edema.   Lymphadenopathy:     She has no cervical adenopathy.   Neurological: She is alert and oriented to person, place, and time.   Skin: Skin is warm and dry. She is not diaphoretic.   Psychiatric: She has a normal mood and affect. Her behavior is normal. Judgment and thought content normal.   Vitals reviewed.      Results:      Results from this visit  Results for orders placed or performed in visit on 04/25/19   Hemoglobin A1c ([facility])   Result Value Ref Range    Hemoglobin A1C 7.8 (H) 4.1 - 5.7 %   Lipid Cascade ([facility])   Result Value Ref Range    Cholesterol 231.5 (H) 0.0 - 200.0 mg/dL    Cholesterol/HDL Ratio 5.9 (H) 0.0 - 5.0    HDL Cholesterol 39.1 (L) >40.0 mg/dL    LDL Cholesterol Calculated 138 (H) 0 - 129 mg/dL    Triglycerides 274.4 (H) 0.0 - 150.0 mg/dL    VLDL Cholesterol 54.9 (H) 7.0 - 32.0 mg/dL   TSH with free T4 reflex   Result Value Ref Range    TSH 2.40 0.40 - 4.00 mU/L   Basic Metabolic Panel ([facility])   Result Value Ref Range    Urea Nitrogen 10.4 7.0 - 19.0 mg/dL    Calcium 9.5 8.5 - 10.1 mg/dL    Chloride 102.6 98.0 - 110.0 mmol/L    Carbon Dioxide 31.1 20.0 - 32.0 mmol/L    Creatinine 0.5 0.5 - 1.0 mg/dL    Glucose 193.0 (H) 70.0 - 99.0 mg'dL    Potassium 4.2  3.3 - 4.5 mmol/dL    Sodium 134.2 132.6 - 141.4 mmol/L    GFR Estimate >90 >60.0 mL/min/1.7 m2    GFR Estimate If Black >90 >60.0 mL/min/1.7 m2       Wei Brasher MD  [facility]

## 2019-04-27 NOTE — RESULT ENCOUNTER NOTE
Dear Zaira  Here are your labs as you can see the cholesterol values especially the LDL is elevated this was as expected and so I think it is good that we restarted the atorvastatin we should recheck this in about 3 months to make sure that that goal. The other labs including your kidney function are normal your glucose was 193 which of course is elevated.  Your A1c value was 7.8 for your age the A1c should be less than 7 so I hope that the new medicine will help achieve that goal.  HIV result was normal as is the thyroid level  Please message me if you have any concerns.  Orlin Brasher MD

## 2019-05-02 ENCOUNTER — OFFICE VISIT (OUTPATIENT)
Dept: FAMILY MEDICINE | Facility: CLINIC | Age: 59
End: 2019-05-02
Payer: COMMERCIAL

## 2019-05-02 ENCOUNTER — ANCILLARY PROCEDURE (OUTPATIENT)
Dept: GENERAL RADIOLOGY | Facility: CLINIC | Age: 59
End: 2019-05-02
Attending: FAMILY MEDICINE
Payer: COMMERCIAL

## 2019-05-02 VITALS
WEIGHT: 189.2 LBS | TEMPERATURE: 98.3 F | OXYGEN SATURATION: 97 % | HEART RATE: 80 BPM | BODY MASS INDEX: 31.52 KG/M2 | DIASTOLIC BLOOD PRESSURE: 89 MMHG | HEIGHT: 65 IN | SYSTOLIC BLOOD PRESSURE: 155 MMHG

## 2019-05-02 DIAGNOSIS — S60.947A: ICD-10-CM

## 2019-05-02 DIAGNOSIS — S60.947A: Primary | ICD-10-CM

## 2019-05-02 ASSESSMENT — MIFFLIN-ST. JEOR: SCORE: 1438.47

## 2019-05-02 NOTE — PROGRESS NOTES
"       HPI       Zaira Ahn is a 58 year old  who presents for   Chief Complaint   Patient presents with     Pain     right pinky, possibly broken, pain level 8     Yesterday patient accidentally injured finger while loading boxes. Unsure of exact injury, thinks might have jammed it head-on. Finger has been painful. Tried ibuprofen, ice. Has limited ROM.      +++++++    Problem, Medication and Allergy Lists were reviewed and updated if needed..    Patient is an established patient of this clinic..         Review of Systems:   Review of Systems         Physical Exam:     Vitals:    05/02/19 0948   BP: 155/89   Pulse: 80   Temp: 98.3  F (36.8  C)   TempSrc: Oral   SpO2: 97%   Weight: 85.8 kg (189 lb 3.2 oz)   Height: 1.65 m (5' 4.96\")     Body mass index is 31.52 kg/m .  Vitals were reviewed and were normal     Physical Exam   Constitutional: She is oriented to person, place, and time. She appears well-developed and well-nourished.   HENT:   Head: Normocephalic and atraumatic.   Eyes: Conjunctivae are normal.   Neck: Neck supple.   Pulmonary/Chest: Effort normal.   Musculoskeletal: Normal range of motion.   Neurological: She is alert and oriented to person, place, and time.   L 5th finger: Limited flexion of DIP. Mild tenderness to palpation of DIP. PIP nontender. No tenderness over 5th metacarpal. No bruising or swelling noted.    Skin: Skin is warm and dry.   Psychiatric: She has a normal mood and affect. Her behavior is normal. Thought content normal.         Results:   XR L 5th finger: No acute fracture per my read. Awaiting formal radiology interpretation.     Assessment and Plan        Zaira was seen today for pain.    Diagnoses and all orders for this visit:    Superficial injury of left little finger, initial encounter  XR without acute fracture. Splinted for comfort/protection. May use ice/ibuprofen as needed. Return if not improved in 1-2 weeks for repeat X-ray to rule out occult fracture if " needed. Pt agrees with plan.   -     XR FINGER LT; Future           There are no discontinued medications.    Options for treatment and follow-up care were reviewed with the patient. Zaira Ahn  engaged in the decision making process and verbalized understanding of the options discussed and agreed with the final plan.    Josefa Zhao,

## 2019-05-03 ENCOUNTER — MYC MEDICAL ADVICE (OUTPATIENT)
Dept: FAMILY MEDICINE | Facility: CLINIC | Age: 59
End: 2019-05-03

## 2019-05-03 DIAGNOSIS — S62.657A CLOSED NONDISPLACED FRACTURE OF MIDDLE PHALANX OF LEFT LITTLE FINGER, INITIAL ENCOUNTER: Primary | ICD-10-CM

## 2019-05-03 NOTE — TELEPHONE ENCOUNTER
Called patient to discuss results. She tells me pain has been increasing today, is more localized to the proximal part of her finger now and is worried that because of the worsening pain things are likely fractured. She has a high deductible on her insurance, would prefer to avoid repeat x-rays if not needed.     Advised continued use of splint. May sydnie tape 5th finger to 4th finger for added support. Follow-up in 1 week. Advised that if things change over the weekend, may utilize walk-in ortho clinic at the [facility] as well.     Will place referral to sports med at [facility] for further evaluation.     Josefa Zhao, DO

## 2019-05-09 ENCOUNTER — TELEPHONE (OUTPATIENT)
Dept: FAMILY MEDICINE | Facility: CLINIC | Age: 59
End: 2019-05-09

## 2019-05-09 NOTE — LETTER
May 14, 2019    Zaira Ahn  [address]  [facility] [address]      Dear: Zaira Ahn    You were recently referred for a Sports Medicine appointment by your primary care provider at [facility].  We have called twice to schedule this appointment, but have been unable to reach you.  If you are interested in receiving this service, please call [facility] at [phone].  We would be happy to schedule this appointment for you.      Thank you.      Sincerely,    Patient Representative    [facility]  Hours:  Monday-Friday 8:00 am - 5:00 pm   Phone Number: [phone]

## 2019-05-09 NOTE — TELEPHONE ENCOUNTER
Attempted to reach patient to schedule an appointment with Dr. Borges in Sports Medicine per referral placed 5/3/19. If patient returns clinic call, please schedule appointment.

## 2019-05-14 NOTE — TELEPHONE ENCOUNTER
Called patient to schedule sports med appointment; second attempt. No answer, left voicemail. Sending letter.    Reason: Possible fracture 5th middle phalanx.   Urgency of Appointment: Urgent (Within 1 week)  Length of Problem: Acute (0-3 weeks since onset): Ordering Provider - please ensure that x-rays are obtained if concern for bone or joint.  What are you requesting be done? Management Recommendations

## 2019-05-16 ENCOUNTER — HEALTH MAINTENANCE LETTER (OUTPATIENT)
Age: 59
End: 2019-05-16

## 2019-07-09 ENCOUNTER — TELEPHONE (OUTPATIENT)
Dept: FAMILY MEDICINE | Facility: CLINIC | Age: 59
End: 2019-07-09

## 2019-07-09 NOTE — TELEPHONE ENCOUNTER
RN attempted reaching patient to inquire more about the symptoms, phone went straight to . Left her a message with name and callback and I will try again soon.  Bell Nye RN

## 2019-07-25 ENCOUNTER — OFFICE VISIT (OUTPATIENT)
Dept: FAMILY MEDICINE | Facility: CLINIC | Age: 59
End: 2019-07-25
Payer: COMMERCIAL

## 2019-07-25 ENCOUNTER — TELEPHONE (OUTPATIENT)
Dept: FAMILY MEDICINE | Facility: CLINIC | Age: 59
End: 2019-07-25

## 2019-07-25 VITALS
BODY MASS INDEX: 30.96 KG/M2 | RESPIRATION RATE: 16 BRPM | HEIGHT: 66 IN | HEART RATE: 70 BPM | DIASTOLIC BLOOD PRESSURE: 82 MMHG | SYSTOLIC BLOOD PRESSURE: 121 MMHG | TEMPERATURE: 98 F | OXYGEN SATURATION: 100 %

## 2019-07-25 DIAGNOSIS — Z12.11 COLON CANCER SCREENING: ICD-10-CM

## 2019-07-25 DIAGNOSIS — H10.9 BACTERIAL CONJUNCTIVITIS: Primary | ICD-10-CM

## 2019-07-25 RX ORDER — ERYTHROMYCIN 5 MG/G
0.5 OINTMENT OPHTHALMIC AT BEDTIME
Qty: 2 G | Refills: 1 | Status: SHIPPED | OUTPATIENT
Start: 2019-07-25 | End: 2019-12-05

## 2019-07-25 NOTE — PROGRESS NOTES
"       HPI       Zaira Ahn is a 58 year old  who presents for   Chief Complaint   Patient presents with     Eye Problem     left eye pain x's 1 months. Redness, burning, itching, pus from the eye. Used OTC with no help. Crusted in the morning. Using warm compressin the mornings.     About 1 month ago had a red left eye. Burning, itching, and drainage - clear. Wakes up with crusting in the morning. No fevers, no vision trouble (baseline difficulty, wears glasses - never wears contacts). Has been trying vysine allergy and over the counter pink eye treatment. Feels it is getting worse because it is now in the right eye. Granddaughter had this first, but hers resolved after 1 week with OTC drops.    +++++++  Problem, Medication and Allergy Lists were reviewed and updated if needed..    Patient is an established patient of this clinic..         Review of Systems:   Review of Systems         Physical Exam:     Vitals:    07/25/19 1633   BP: 121/82   Pulse: 70   Resp: 16   Temp: 98  F (36.7  C)   TempSrc: Oral   SpO2: 100%   Height: 1.665 m (5' 5.55\")     Body mass index is 30.96 kg/m .  Vitals were reviewed and were normal     Physical Exam   Constitutional: She appears well-developed and well-nourished. No distress.   Eyes: Pupils are equal, round, and reactive to light. EOM are normal. Right eye exhibits no discharge. Left eye exhibits no discharge.   Conjunctival injection, mild   Cardiovascular: Normal rate.   Pulmonary/Chest: Effort normal.   Skin: She is not diaphoretic.   Vitals reviewed.      Results:   No testing ordered today    Assessment and Plan        1. Conjunctivitis, bacterial vs viral vs rebound vasodilation from chronic use of vasoconstricting eye drops.  Patient most likely has rebound vasodilation from her chronic use of eye drops containing vasoconstricting substance. Unlikely bacterial given duration of symptoms, minimal to no discharge present. Unlikely viral given short duration. Possibly " "bacterial or viral etiology early in disease course, given known positive contact - however, patient may now have irritation vs rebound vasodilation. Patient would like an ointment instead of eye drop, to provide lubrication as eye drops are \"stinging.\"  Instructed patient to stop using eye drops.   - erythromycin (ROMYCIN) 5 MG/GM ophthalmic ointment; Place 0.5 inches into both eyes At Bedtime  Dispense: 2 g; Refill: 1    2. Colon cancer screening  Patient due for colon cancer screening.   - Fecal colorectal cancer screen FITT; Future       There are no discontinued medications.    Options for treatment and follow-up care were reviewed with the patient. Zaira Ahn  engaged in the decision making process and verbalized understanding of the options discussed and agreed with the final plan.    Carolin Schwartz, DO  "

## 2019-07-25 NOTE — TELEPHONE ENCOUNTER
RN is attempting to reach patient without success. Patient's phone is not accepting calls, RN is seeking an alternate number with call center staff.   Bell Nye, RN

## 2019-07-25 NOTE — TELEPHONE ENCOUNTER
[facility] phone call message-patient reporting a symptom:     Symptom: Red eyes    Same Day Visit Offered: Yes, declined    Additional comments: Patient called in for the Same issues on 07/09/2019 never received a call back but Now still has red eyes.. Patient does  for two grand daughters who both had pink eye 2 weeks ago, so she is sure that is what is is. Would like a prescription called in to [facility] on [address] with out an office visit.    OK to leave message on voice mail? Yes    Primary language: English      needed? No    Call taken on July 25, 2019 at 9:40 AM by Kimberli Luke

## 2019-07-25 NOTE — PATIENT INSTRUCTIONS
Here is the plan from today's visit    1. Bacterial conjunctivitis  - erythromycin (ROMYCIN) 5 MG/GM ophthalmic ointment; Place 0.5 inches into both eyes At Bedtime  Dispense: 2 g; Refill: 1    2. Colon cancer screening  - Fecal colorectal cancer screen FITT; Future    Please call or return to clinic if your symptoms don't go away.    Follow up plan  Please make a clinic appointment for follow up with your primary physician Wei Brasher MD for routine annual physical.    Thank you for coming to [facility] today.  Lab Testing:  **If you had lab testing today and your results are reassuring or normal they will be mailed to you or sent through [facility] within 7 days.   **If the lab tests need quick action we will call you with the results.  The phone number we will call with results is # [phone] (home) [phone] (work). If this is not the best number please call our clinic and change the number.  Medication Refills:  If you need any refills please call your pharmacy and they will contact us.   If you need to  your refill at a new pharmacy, please contact the new pharmacy directly. The new pharmacy will help you get your medications transferred faster.   Scheduling:  If you have any concerns about today's visit or wish to schedule another appointment please call our office during normal business hours [phone] (8-5:00 M-F)  If a referral was made to a [facility] and you don't get a call from central scheduling please call [phone].  If a Mammogram was ordered for you at [facility] call [phone] to schedule or change your appointment.  If you had an XRay/CT/Ultrasound/MRI ordered the number is [phone] to schedule or change your radiology appointment.   Medical Concerns:  If you have urgent medical concerns please call [phone] at any time of the day.    Carolin Schwartz, DO

## 2019-07-25 NOTE — TELEPHONE ENCOUNTER
"RN contacted patient and she states they are itchy and red, she wakes up with crusty eyes. They feel gritty and they get a \"goopy discharge\" during the day. She states that both grandchildren had pink eye and she believes this is what it is.    RN did not promise a prescription and stated that with the length this has been going on, it may warrant a visit. However will route to MD to please advise today so she can get an appointment for tomorrow if necessary. Pharmacy pended.  Bell Nye RN   "

## 2019-07-25 NOTE — PROGRESS NOTES
Preceptor Attestation:   Patient seen, evaluated and discussed with the resident.   I have verified the content of the note, which accurately reflects my assessment of the patient and the plan of care.   Supervising Physician:  Lazaro Rodriguez MD

## 2019-09-29 ENCOUNTER — HEALTH MAINTENANCE LETTER (OUTPATIENT)
Age: 59
End: 2019-09-29

## 2019-10-30 DIAGNOSIS — E11.41 DIABETIC MONONEUROPATHY ASSOCIATED WITH TYPE 2 DIABETES MELLITUS (H): ICD-10-CM

## 2019-10-30 NOTE — TELEPHONE ENCOUNTER
"Request for medication refill:gabapentin (NEURONTIN) 300 MG capsule    Providers if patient needs an appointment and you are willing to give a one month supply please refill for one month and  send a letter/MyChart using \".SMILLIMITEDREFILL\" .smillimited and route chart to \"[facility] \" (Giving one month refill in non controlled medications is strongly recommended before denial)    If refill has been denied, meaning absolutely no refills without visit, please complete the smart phrase \".smirxrefuse\" and route it to the \"[facility] MED REFILLS\"  pool to inform the patient and the pharmacy.    Law Raven, MA        "

## 2019-10-31 RX ORDER — GABAPENTIN 300 MG/1
300 CAPSULE ORAL 2 TIMES DAILY
Qty: 180 CAPSULE | Refills: 1 | Status: SHIPPED | OUTPATIENT
Start: 2019-10-31 | End: 2019-12-05

## 2019-12-05 ENCOUNTER — OFFICE VISIT (OUTPATIENT)
Dept: FAMILY MEDICINE | Facility: CLINIC | Age: 59
End: 2019-12-05
Payer: COMMERCIAL

## 2019-12-05 VITALS
WEIGHT: 187.6 LBS | HEART RATE: 78 BPM | DIASTOLIC BLOOD PRESSURE: 101 MMHG | HEIGHT: 65 IN | OXYGEN SATURATION: 98 % | BODY MASS INDEX: 31.25 KG/M2 | TEMPERATURE: 98.3 F | SYSTOLIC BLOOD PRESSURE: 168 MMHG

## 2019-12-05 DIAGNOSIS — Z23 NEED FOR PROPHYLACTIC VACCINATION AND INOCULATION AGAINST INFLUENZA: ICD-10-CM

## 2019-12-05 DIAGNOSIS — F41.8 DEPRESSION WITH ANXIETY: ICD-10-CM

## 2019-12-05 DIAGNOSIS — I10 ESSENTIAL HYPERTENSION: ICD-10-CM

## 2019-12-05 DIAGNOSIS — E11.41 DIABETIC MONONEUROPATHY ASSOCIATED WITH TYPE 2 DIABETES MELLITUS (H): ICD-10-CM

## 2019-12-05 DIAGNOSIS — E11.9 TYPE 2 DIABETES MELLITUS WITHOUT COMPLICATION, WITHOUT LONG-TERM CURRENT USE OF INSULIN (H): Primary | ICD-10-CM

## 2019-12-05 DIAGNOSIS — Z71.6 ENCOUNTER FOR SMOKING CESSATION COUNSELING: ICD-10-CM

## 2019-12-05 DIAGNOSIS — Z12.31 ENCOUNTER FOR SCREENING MAMMOGRAM FOR BREAST CANCER: ICD-10-CM

## 2019-12-05 DIAGNOSIS — Z00.00 ROUTINE GENERAL MEDICAL EXAMINATION AT A HEALTH CARE FACILITY: ICD-10-CM

## 2019-12-05 LAB
CHOLEST SERPL-MCNC: 235 MG/DL
CREAT UR-MCNC: 92 MG/DL
HBA1C MFR BLD: 6.1 % (ref 4.1–5.7)
HDLC SERPL-MCNC: 44 MG/DL
LDLC SERPL CALC-MCNC: 117 MG/DL
MICROALBUMIN UR-MCNC: 75 MG/L
MICROALBUMIN/CREAT UR: 80.97 MG/G CR (ref 0–25)
NONHDLC SERPL-MCNC: 191 MG/DL
TRIGL SERPL-MCNC: 369 MG/DL

## 2019-12-05 RX ORDER — SERTRALINE HYDROCHLORIDE 25 MG/1
25 TABLET, FILM COATED ORAL DAILY
Qty: 30 TABLET | Refills: 11 | Status: SHIPPED | OUTPATIENT
Start: 2019-12-05 | End: 2020-12-04

## 2019-12-05 RX ORDER — NICOTINE 21 MG/24HR
1 PATCH, TRANSDERMAL 24 HOURS TRANSDERMAL EVERY 24 HOURS
Qty: 30 PATCH | Refills: 1 | Status: SHIPPED | OUTPATIENT
Start: 2019-12-05 | End: 2020-02-24

## 2019-12-05 RX ORDER — ATORVASTATIN CALCIUM 40 MG/1
40 TABLET, FILM COATED ORAL DAILY
Qty: 30 TABLET | Refills: 11 | Status: SHIPPED | OUTPATIENT
Start: 2019-12-05 | End: 2020-12-07

## 2019-12-05 RX ORDER — GABAPENTIN 300 MG/1
300 CAPSULE ORAL 2 TIMES DAILY
Qty: 180 CAPSULE | Refills: 1 | Status: SHIPPED | OUTPATIENT
Start: 2019-12-05 | End: 2020-06-29

## 2019-12-05 RX ORDER — ATENOLOL AND CHLORTHALIDONE TABLET 50; 25 MG/1; MG/1
1 TABLET ORAL DAILY
Qty: 30 TABLET | Refills: 11 | Status: SHIPPED | OUTPATIENT
Start: 2019-12-05 | End: 2020-12-07

## 2019-12-05 RX ORDER — METFORMIN HCL 500 MG
1000 TABLET, EXTENDED RELEASE 24 HR ORAL
Qty: 60 TABLET | Refills: 11 | Status: SHIPPED | OUTPATIENT
Start: 2019-12-05 | End: 2020-12-07

## 2019-12-05 ASSESSMENT — MIFFLIN-ST. JEOR: SCORE: 1426.21

## 2019-12-05 NOTE — PROGRESS NOTES
SUBJECTIVE:   CC: Zaira Ahn is an 59 year old woman who presents for preventive health visit.     Healthy Habits:    Do you get at least three servings of calcium containing foods daily (dairy, green leafy vegetables, etc.)? yes    Amount of exercise or daily activities, outside of work: very little    Problems taking medications regularly Yes      Medication side effects: No    Have you had an eye exam in the past two years? yes    Do you see a dentist twice per year? yes    Do you have sleep apnea, excessive snoring or daytime drowsiness?no        Diabetes Follow-up  <7.0    Patient is checking blood sugars: not at all         -Last A1C was   Lab Results   Component Value Date    A1C 6.1 12/05/2019    A1C 7.8 04/25/2019    A1C 6.3 08/26/2014        Diabetic concerns: None    Chest Pain or exercise related calf pain (claudication):no     Symptoms of hypoglycemia (low blood sugar): none     Paresthesias (numbness or burning in feet) or sores: No     Diabetic eye exam within the last year?: Yes    Hyperlipidemia Follow-Up      Recommended Level of Therapy:High Intensity Statin (atorvastatin 40*-80 mg or rosuvastatin 20-40mg)    Rate your low fat/cholesterol diet?: good    Taking statin?  Yes, no muscle aches from statin    Other lipid medications/supplements?:  none  Lab Results   Component Value Date    CHOL 231.5 04/25/2019     Lab Results   Component Value Date    HDL 39.1 04/25/2019     Lab Results   Component Value Date     04/25/2019     Lab Results   Component Value Date    TRIG 274.4 04/25/2019     Lab Results   Component Value Date    CHOLHDLRATIO 5.9 04/25/2019       Hypertension Follow-up     <140/90    Outpatient blood pressures are not being checked.    Low Salt Diet: no added salt    Daily NSAID Use?no     Did patient take their HTN pills today?  Yes  Last Basic Metabolic Panel:  Lab Results   Component Value Date    .2 04/25/2019      Lab Results   Component Value Date     POTASSIUM 4.2 04/25/2019     Lab Results   Component Value Date    CHLORIDE 102.6 04/25/2019     Lab Results   Component Value Date    DEVAUGHN 9.5 04/25/2019     Lab Results   Component Value Date    CO2 31.1 04/25/2019     Lab Results   Component Value Date    BUN 10.4 04/25/2019     Lab Results   Component Value Date    CR 0.5 04/25/2019     Lab Results   Component Value Date    .0 04/25/2019         Adherence and Exercise  Medication side effects: no  How often is a medication missed? More than 3 times per week  Exercise:walking  A lot at work         Today's PHQ-2 Score:   PHQ-2 ( 1999 [employer]) 12/5/2019 7/25/2019   Q1: Little interest or pleasure in doing things 3 0   Q2: Feeling down, depressed or hopeless 3 0   PHQ-2 Score 6 0       Abuse: Current or Past(Physical, Sexual or Emotional)- No  Do you feel safe in your environment? Yes         Social History     Tobacco Use     Smoking status: Light Tobacco Smoker     Smokeless tobacco: Never Used   Substance Use Topics     Alcohol use: Yes     If you drink alcohol do you typically have >3 drinks per day or >7 drinks per week? Yes - AUDIT SCORE:     No flowsheet data found.                     Reviewed orders with patient.  Reviewed health maintenance and updated orders accordingly - Yes  Lab work is in process    Mammogram Screening: Patient over age 50, mutual decision to screen reflected in health maintenance.    Pertinent mammograms are reviewed under the imaging tab.  History of abnormal Pap smear: NO - age 30-65 PAP every 5 years with negative HPV co-testing recommended     Reviewed and updated as needed this visit by clinical staff  Tobacco  Allergies  Meds  Problems  Med Hx  Surg Hx  Fam Hx  Soc Hx          Reviewed and updated as needed this visit by Provider  Tobacco  Allergies  Meds  Problems  Med Hx  Surg Hx  Fam Hx            ROS:  CONSTITUTIONAL: NEGATIVE for fever, chills, change in weight  INTEGUMENTARY/SKIN: NEGATIVE for worrisome  "rashes, moles or lesions  EYES: NEGATIVE for vision changes or irritation  ENT: NEGATIVE for ear, mouth and throat problems  RESP: NEGATIVE for significant cough or SOB  BREAST: NEGATIVE for masses, tenderness or discharge  CV: NEGATIVE for chest pain, palpitations or peripheral edema  GI: NEGATIVE for nausea, abdominal pain, heartburn, or change in bowel habits  : NEGATIVE for unusual urinary or vaginal symptoms. No vaginal bleeding.  MUSCULOSKELETAL: Reports significant hand pain especially with grasping feels it is harder to grasping things that used to be.  NEURO: NEGATIVE for weakness, dizziness or paresthesias  PSYCHIATRIC: NEGATIVE for changes in mood or affect     OBJECTIVE:   BP (!) 168/101   Pulse 78   Temp 98.3  F (36.8  C) (Oral)   Ht 1.65 m (5' 4.96\")   Wt 85.1 kg (187 lb 9.6 oz)   SpO2 98%   BMI 31.26 kg/m    EXAM:  GENERAL: healthy, alert and no distress  EYES: Eyes grossly normal to inspection, PERRL and conjunctivae and sclerae normal  HENT: ear canals and TM's normal, nose and mouth without ulcers or lesions  NECK: no adenopathy, no asymmetry, masses, or scars and thyroid normal to palpation  RESP: lungs clear to auscultation - no rales, rhonchi or wheezes  BREAST: normal without masses, tenderness or nipple discharge and no palpable axillary masses or adenopathy  CV: regular rate and rhythm, normal S1 S2, no S3 or S4, no murmur, click or rub, no peripheral edema and peripheral pulses strong  ABDOMEN: soft, nontender, no hepatosplenomegaly, no masses and bowel sounds normal  MS: no gross musculoskeletal defects noted, no edema  SKIN: no suspicious lesions or rashes  NEURO: Normal strength and tone, mentation intact and speech normal  PSYCH: mentation appears normal, affect normal/bright  Diabetic Foot Screen:  Any complaints of increased pain or numbness ? No  Is there a foot ulcer now or a history of foot ulcer? No  Does the foot have an abnormal shape? No  Are the nails thick, too long or " ingrown? No  Are there any redness or open areas? No         Sensation Testing done at all points on the diagram with monofilament     Right Foot: Sensation Normal at all points  Left Foot: Sensation Normal at all points     Risk Category: 0- No loss of protective sensation  Performed by Wei Brasher MD    Diagnostic Test Results:  Labs reviewed in Epic  Results for orders placed or performed in visit on 12/05/19 (from the past 24 hour(s))   Hemoglobin A1c ([facility])   Result Value Ref Range    Hemoglobin A1C 6.1 (H) 4.1 - 5.7 %   Lipid panel reflex to direct LDL Fasting   Result Value Ref Range    Cholesterol 235 (H) <200 mg/dL    Triglycerides 369 (H) <150 mg/dL    HDL Cholesterol 44 (L) >49 mg/dL    LDL Cholesterol Calculated 117 (H) <100 mg/dL    Non HDL Cholesterol 191 (H) <130 mg/dL   Albumin Random Urine Quantitative with Creat Ratio   Result Value Ref Range    Creatinine Urine 92 mg/dL    Albumin Urine mg/L 75 mg/L    Albumin Urine mg/g Cr 80.97 (H) 0 - 25 mg/g Cr       ASSESSMENT/PLAN:   1. Type 2 diabetes mellitus without complication, without long-term current use of insulin (H)  Diabetes currently controlled will contact patient with results A1c is uncontrolled so no change in an medications needed at this point  - Hemoglobin A1c ([facility])  - Albumin Random Urine Quantitative with Creat Ratio  - Lipid panel reflex to direct LDL Fasting  - atorvastatin (LIPITOR) 40 MG tablet; Take 1 tablet (40 mg) by mouth daily  Dispense: 30 tablet; Refill: 11  - metFORMIN (GLUCOPHAGE-XR) 500 MG 24 hr tablet; Take 2 tablets (1,000 mg) by mouth daily (with dinner)  Dispense: 60 tablet; Refill: 11    2. Encounter for screening mammogram for breast cancer  Patient to follow-up for screening mammogram  - MA Screening Digital Bilateral; Future    3. Essential hypertension  Patient has not been taking her medication today so will restart her current medication and we will recheck patient will communicate results to me by my  "chart  - atenolol-chlorthalidone (TENORETIC) 50-25 MG tablet; Take 1 tablet by mouth daily  Dispense: 30 tablet; Refill: 11    4. Diabetic mononeuropathy associated with type 2 diabetes mellitus (H)  Continue take this as needed foot exam was normal  - gabapentin (NEURONTIN) 300 MG capsule; Take 1 capsule (300 mg) by mouth 2 times daily  Dispense: 180 capsule; Refill: 1    5. Encounter for smoking cessation counseling  Patient was advised to use some breathing for 7 8 breathing for management of anxiety symptoms  - nicotine (NICODERM CQ) 14 MG/24HR 24 hr patch; Place 1 patch onto the skin every 24 hours  Dispense: 30 patch; Refill: 1    6. Depression with anxiety  Continue current dose increase exercise and breathing for anxiety  - sertraline (ZOLOFT) 25 MG tablet; Take 1 tablet (25 mg) by mouth daily  Dispense: 30 tablet; Refill: 11    7. Routine general medical examination at a health care facility       8. Need for prophylactic vaccination and inoculation against influenza     - Fluzone quad, preserve-free/prefilled  0.5ml, 6+ months [09354]    COUNSELING:   Reviewed preventive health counseling, as reflected in patient instructions       Regular exercise       Healthy diet/nutrition    Estimated body mass index is 31.26 kg/m  as calculated from the following:    Height as of this encounter: 1.65 m (5' 4.96\").    Weight as of this encounter: 85.1 kg (187 lb 9.6 oz).    Weight management plan: Discussed healthy diet and exercise guidelines     reports that she has been smoking. She has never used smokeless tobacco.  Tobacco Cessation Action Plan: Pharmacotherapies : Nicotine patch     follow up for Pap smear.    Wei Brasher MD  [facility]  "

## 2019-12-06 NOTE — RESULT ENCOUNTER NOTE
Dear Zaira Jaimes to see you today.  Here are your labs as you can see the the albumin in your urine is elevated.  This suggests that you should be started on a new blood pressure medicine.  I recommend that you come back in a month or so to have your Pap smear and we can talk about this more.  Also your cholesterol did not go down at all despite starting the medication though it may be that you have not been taking it regularly again unless have you back in about 4 to 6 weeks after starting and taking the medication regularly and will check it again..     Please message me if you have any concerns.  Orlin Brasher MD

## 2020-02-24 DIAGNOSIS — Z71.6 ENCOUNTER FOR SMOKING CESSATION COUNSELING: ICD-10-CM

## 2020-02-24 RX ORDER — NICOTINE 21 MG/24HR
1 PATCH, TRANSDERMAL 24 HOURS TRANSDERMAL EVERY 24 HOURS
Qty: 30 PATCH | Refills: 1 | Status: SHIPPED | OUTPATIENT
Start: 2020-02-24

## 2020-02-24 NOTE — TELEPHONE ENCOUNTER
"Request for medication refill: nicotine (NICODERM CQ) 14 MG/24HR 24 hr patch    Providers if patient needs an appointment and you are willing to give a one month supply please refill for one month and  send a letter/MyChart using \".SMILLIMITEDREFILL\" .smillimited and route chart to \"[facility] \" (Giving one month refill in non controlled medications is strongly recommended before denial)    If refill has been denied, meaning absolutely no refills without visit, please complete the smart phrase \".smirxrefuse\" and route it to the \"[facility] MED REFILLS\"  pool to inform the patient and the pharmacy.    Cammie Montgomery CMA        "

## 2020-06-29 DIAGNOSIS — E11.41 DIABETIC MONONEUROPATHY ASSOCIATED WITH TYPE 2 DIABETES MELLITUS (H): ICD-10-CM

## 2020-06-29 RX ORDER — GABAPENTIN 300 MG/1
300 CAPSULE ORAL 2 TIMES DAILY
Qty: 180 CAPSULE | Refills: 1 | Status: SHIPPED | OUTPATIENT
Start: 2020-06-29 | End: 2020-07-03

## 2020-06-29 NOTE — TELEPHONE ENCOUNTER
"Request for medication refill: gabapentin (NEURONTIN) 300 MG capsule     Providers if patient needs an appointment and you are willing to give a one month supply please refill for one month and  send a letter/MyChart using \".SMILLIMITEDREFILL\" .smillimited and route chart to \"[facility] \" (Giving one month refill in non controlled medications is strongly recommended before denial)    If refill has been denied, meaning absolutely no refills without visit, please complete the smart phrase \".smirxrefuse\" and route it to the \"[facility] MED REFILLS\"  pool to inform the patient and the pharmacy.    Love Delgado, [facility]         "

## 2020-07-03 ENCOUNTER — OFFICE VISIT (OUTPATIENT)
Dept: FAMILY MEDICINE | Facility: CLINIC | Age: 60
End: 2020-07-03
Payer: COMMERCIAL

## 2020-07-03 VITALS
TEMPERATURE: 98.9 F | DIASTOLIC BLOOD PRESSURE: 91 MMHG | HEIGHT: 66 IN | BODY MASS INDEX: 30.51 KG/M2 | HEART RATE: 74 BPM | RESPIRATION RATE: 16 BRPM | SYSTOLIC BLOOD PRESSURE: 151 MMHG

## 2020-07-03 DIAGNOSIS — E11.41 DIABETIC MONONEUROPATHY ASSOCIATED WITH TYPE 2 DIABETES MELLITUS (H): ICD-10-CM

## 2020-07-03 DIAGNOSIS — M65.4 RADIAL STYLOID TENOSYNOVITIS OF LEFT HAND: ICD-10-CM

## 2020-07-03 DIAGNOSIS — L03.012 CELLULITIS OF FINGER OF LEFT HAND: Primary | ICD-10-CM

## 2020-07-03 RX ORDER — SULFAMETHOXAZOLE/TRIMETHOPRIM 800-160 MG
1 TABLET ORAL 2 TIMES DAILY
Qty: 20 TABLET | Refills: 0 | Status: SHIPPED | OUTPATIENT
Start: 2020-07-03 | End: 2020-07-13

## 2020-07-03 RX ORDER — GABAPENTIN 300 MG/1
300 CAPSULE ORAL 2 TIMES DAILY
Qty: 180 CAPSULE | Refills: 1 | Status: SHIPPED | OUTPATIENT
Start: 2020-07-03 | End: 2021-02-03

## 2020-07-03 NOTE — NURSING NOTE
Clinic Administered Medication Documentation      Injectable Medication Documentation    Patient was given Ceftriaxone Sodium (Rocephin). Prior to medication administration, verified patients identity using patient s name and date of birth. Please see MAR and medication order for additional information. Patient instructed to remain in clinic for 15 minutes.      Was entire vial of medication used? Yes  Vial/Syringe: Single dose vial  Expiration Date:  4/1/21  Was this medication supplied by the patient? No    Name of provider who requested the medication administration: Pina Do MD  Name of provider on site (faculty or community preceptor) at the time of performing the medication administration: MD Tisha Rubalcava [facility] 3:06 PM July 3, 2020

## 2020-07-03 NOTE — PROGRESS NOTES
Zaira is a 59 year old  who presents for   Patient presents with:  Laceration: Patient cut her left thumb last night. Cleaned and put a steri strips,but she feels like it is infected.Feels itchy,looks irrated and is painful to do any ROM with thumb.  Refill Request: Gabapentin      Assessment and Plan       Patient is a 59-year-old female who comes in today after cut with paring knife on her left thumb.  However looking at this wound appears like it could be longer than 1 day.  Patient states that she cleaned it well and used Steri-Strips to close cut.  Which seems to be the case as it is healing closed nicely.  However patient now is having erythema tenderness warmth joint does not appear to be swollen however.  Most likely does have a skin infection but concern now for infection along the tendon sheath.  Not likely to be osteomyelitis at this point, as no tenderness to palpation of bone and no fevers.  Also not likely to be septic joint as he has no fevers and no pain with movement of joint.  However given concern that there might be his tendon involvement will give strong dose antibiotics ceftriaxone IM today and then continue with oral antibiotic treatment for cellulitis as well.    1. Cellulitis of finger of left hand  - sulfamethoxazole-trimethoprim (BACTRIM DS) 800-160 MG tablet; Take 1 tablet by mouth 2 times daily for 10 days  Dispense: 20 tablet; Refill: 0  - cefTRIAXone (ROCEPHIN) injection 500 mg    2. Radial styloid tenosynovitis of left hand  - cefTRIAXone (ROCEPHIN) injection 500 mg    3. Diabetic mononeuropathy associated with type 2 diabetes mellitus (H)  - gabapentin (NEURONTIN) 300 MG capsule; Take 1 capsule (300 mg) by mouth 2 times daily  Dispense: 180 capsule; Refill: 1     Return in about 1 week (around 7/10/2020) for Cellulitis follow up.    Medications Discontinued During This Encounter   Medication Reason     gabapentin (NEURONTIN) 300 MG capsule Reorder         Pina Do MD  PGY  "2 Family Medicine         HPI       Zaira is a 59 year old  who presents for   Patient presents with:  Laceration: Patient cut her left thumb last night. Cleaned and put a steri strips,but she feels like it is infected.Feels itchy,looks irrated and is painful to do any ROM with thumb.  Refill Request: Gabapentin      Visit [facility]  1. Cut finger  Pt was trying to get the thread of the bottle filled with apple vinger make shift fly catch. Used a paring knife to open it, slipped and cut left thumb. Was very bloody.  Uses soap and water, cleaned with hydrogen peroxide, then added bacitracin. Daughter had butterfly bandages and put it on. Added more peroxide. No ER visit. Tdap last 10/2015, not rusted, not used to cut anything else. No fevers. Thumb is red, hot, swollen, painful around joint. Full ROM.       Problem, Medication and Allergy Lists were reviewed and updated if needed..  Patient is an established patient of this clinic.  Reviewed and updated as needed this visit by clinical staff  Tobacco  Allergies  Meds  Problems  Med Hx  Surg Hx  Fam Hx  Soc Hx        Reviewed and updated as needed this visit by Provider  Tobacco  Allergies  Meds  Problems  Med Hx  Surg Hx  Fam Hx       Health Maintenance Due   Topic Date Due     ADVANCE CARE PLANNING  1960     MAMMO SCREENING  1960     COLORECTAL CANCER SCREENING  09/19/1970     PAP  09/19/1981     BMP  04/25/2020     A1C  06/05/2020     ZOSTER IMMUNIZATION (2 of 2) 01/30/2020          Review of Systems:   Review of Systems  10 point review of symptoms was otherwise negative except as noted in HPI         Physical Exam:     Vitals:    07/03/20 1402 07/03/20 1406   BP: (!) 148/88 (!) 151/91   Pulse: 74    Resp: 16    Temp: 98.9  F (37.2  C)    TempSrc: Oral    Height: 1.67 m (5' 5.75\")      Body mass index is 30.51 kg/m .  Vitals were reviewed and BP was elevated, but likely due to pain and anxiety.     Physical Exam  Vitals signs reviewed. "   Constitutional:       General: She is not in acute distress.     Appearance: Normal appearance. She is not ill-appearing or toxic-appearing.   HENT:      Head: Normocephalic and atraumatic.   Cardiovascular:      Rate and Rhythm: Normal rate and regular rhythm.      Pulses: Normal pulses.   Skin:     Capillary Refill: Capillary refill takes less than 2 seconds.      Findings: Erythema and lesion present.      Comments: 2.5 cm laceration on left thumb, superficial with adipose tissue exposed, mid-healing with serous fluid weeping out, erythema around laceration that is warm to touch   Neurological:      General: No focal deficit present.      Mental Status: She is alert.      Sensory: No sensory deficit.         Right Hand Exam   Right hand exam is normal.    Tenderness   The patient is experiencing no tenderness.     Range of Motion   The patient has normal right wrist ROM.     Other   Erythema: absent  Sensation: normal  Pulse: present      Left Hand Exam     Tenderness   Left hand tenderness location: Tenderness around laceration and mildly around MCP joint.     Range of Motion   Wrist   Extension: normal   Flexion: normal   Pronation: normal   Supination: normal   Hand   MP Thumb: 80   DIP Thumb: normal     Muscle Strength   Left wrist normal muscle strength: Lessened due to pain.  Wrist extension: 5/5   Wrist flexion: 5/5   :  4/5     Tests   Phalen s Sign: negative  Finkelstein's test: negative    Other   Erythema: present  Scars: absent  Sensation: normal  Pulse: present    Comments:  Mild tenderness to flexion resistance, no tenderness with movement of DIP joint.                    Results:   No testing ordered today    Options for treatment and follow-up care were reviewed with the patient. Zaira Ahn  engaged in the decision making process and verbalized understanding of the options discussed and agreed with the final plan.  Pina Do MD  PGY 2 Family Medicine  [facility]  MEDICINE [facility]INIC

## 2020-07-03 NOTE — PATIENT INSTRUCTIONS
Here is the plan from today's visit    1. Cellulitis of finger of left hand  Return if fevers, chills, redness worsens, or pain worsens return.  - sulfamethoxazole-trimethoprim (BACTRIM DS) 800-160 MG tablet; Take 1 tablet by mouth 2 times daily for 10 days  Dispense: 20 tablet; Refill: 0  - cefTRIAXone (ROCEPHIN) injection 500 mg    2. Radial styloid tenosynovitis of left hand  - cefTRIAXone (ROCEPHIN) injection 500 mg    3. Diabetic mononeuropathy associated with type 2 diabetes mellitus (H)  - gabapentin (NEURONTIN) 300 MG capsule; Take 1 capsule (300 mg) by mouth 2 times daily  Dispense: 180 capsule; Refill: 1      Please call or return to clinic if your symptoms don't go away.    Follow up plan  Please make a clinic appointment for follow up with me (KIRAN MENDOZA) in 1  week for cellulitis follow up.    Thank you for coming to [facility] today.  Lab Testing:  **If you had lab testing today and your results are reassuring or normal they will be mailed to you or sent through [employer] within 7 days.   **If the lab tests need quick action we will call you with the results.  The phone number we will call with results is # [phone] (home) [phone] (work). If this is not the best number please call our clinic and change the number.  Medication Refills:  If you need any refills please call your pharmacy and they will contact us.   If you need to  your refill at a new pharmacy, please contact the new pharmacy directly. The new pharmacy will help you get your medications transferred faster.   Scheduling:  If you have any concerns about today's visit or wish to schedule another appointment please call our office during normal business hours [phone] (8-5:00 M-F)  If a referral was made to a [facility] and you don't get a call from central scheduling please call [phone].  If a Mammogram was ordered for you at [facility] call [phone] to schedule or change  your appointment.  If you had an XRay/CT/Ultrasound/MRI ordered the number is [phone] to schedule or change your radiology appointment.   Medical Concerns:  If you have urgent medical concerns please call [phone] at any time of the day.    Pina Do MD

## 2020-07-08 NOTE — PROGRESS NOTES
Preceptor Attestation:   Patient seen, evaluated and discussed with the resident. I have verified the content of the note, which accurately reflects my assessment of the patient and the plan of care.   Supervising Physician:  Wei Brasher MD

## 2020-09-11 ENCOUNTER — TELEPHONE (OUTPATIENT)
Dept: FAMILY MEDICINE | Facility: CLINIC | Age: 60
End: 2020-09-11

## 2020-09-11 DIAGNOSIS — E11.9 TYPE 2 DIABETES MELLITUS WITHOUT COMPLICATION, WITHOUT LONG-TERM CURRENT USE OF INSULIN (H): Primary | ICD-10-CM

## 2020-09-11 NOTE — TELEPHONE ENCOUNTER
[facility] phone call message - order or referral request from patient:     Order or referral being requested: Requested order A1C and BMP labs    Additional Details:     Referral only -Specialty  Location     OK to leave a message on voice mail? Yes    Primary language: English      needed? No    Call taken on September 11, 2020 at 1:06 PM by April Vonnie    Order request route to [facility] TRIAGE   Referrals Route to [facility] (Green/[place]/Purple) CARE COORDINATOR

## 2020-09-11 NOTE — TELEPHONE ENCOUNTER
Patient requesting lab order for BMP and Hgb A1C. Routing to provider to place if appropriate.   Ashley Alonso RN

## 2020-12-04 DIAGNOSIS — F41.8 DEPRESSION WITH ANXIETY: ICD-10-CM

## 2020-12-04 RX ORDER — SERTRALINE HYDROCHLORIDE 25 MG/1
25 TABLET, FILM COATED ORAL DAILY
Qty: 30 TABLET | Refills: 11 | Status: SHIPPED | OUTPATIENT
Start: 2020-12-04 | End: 2022-03-09

## 2020-12-04 NOTE — TELEPHONE ENCOUNTER
"Request for medication refill Sertraline     Providers if patient needs an appointment and you are willing to give a one month supply please refill for one month and  send a letter/MyChart using \".SMILLIMITEDREFILL\" .smillimited and route chart to \"P SMI \" (Giving one month refill in non controlled medications is strongly recommended before denial)    If refill has been denied, meaning absolutely no refills without visit, please complete the smart phrase \".smirxrefuse\" and route it to the \"P SMI MED REFILLS\"  pool to inform the patient and the pharmacy.    Ariana Ba, CMA        "

## 2020-12-07 DIAGNOSIS — I10 ESSENTIAL HYPERTENSION: ICD-10-CM

## 2020-12-07 DIAGNOSIS — E11.9 TYPE 2 DIABETES MELLITUS WITHOUT COMPLICATION, WITHOUT LONG-TERM CURRENT USE OF INSULIN (H): ICD-10-CM

## 2020-12-07 RX ORDER — ATENOLOL AND CHLORTHALIDONE TABLET 50; 25 MG/1; MG/1
1 TABLET ORAL DAILY
Qty: 90 TABLET | Refills: 0 | Status: SHIPPED | OUTPATIENT
Start: 2020-12-07 | End: 2021-03-12

## 2020-12-07 RX ORDER — ATORVASTATIN CALCIUM 40 MG/1
40 TABLET, FILM COATED ORAL DAILY
Qty: 30 TABLET | Refills: 11 | Status: SHIPPED | OUTPATIENT
Start: 2020-12-07 | End: 2022-01-25

## 2020-12-07 RX ORDER — METFORMIN HCL 500 MG
1000 TABLET, EXTENDED RELEASE 24 HR ORAL
Qty: 60 TABLET | Refills: 11 | Status: SHIPPED | OUTPATIENT
Start: 2020-12-07 | End: 2024-02-21

## 2020-12-07 NOTE — TELEPHONE ENCOUNTER
Since pt out RN send emergency supply per protocol. Routing to PCP to send further refills if appropriate    Angelika Chance RN

## 2020-12-07 NOTE — TELEPHONE ENCOUNTER
Verify that the refill encounter hasn't been started Yes    [facility] phone call message- patient requesting a refill:    Full Medication Name: atenolol-chlorthalidone (TENORETIC) 50-25 MG tablet    Dose:      Pharmacy confirmed as   [id number] - [place], [place] - [address] [facility] [address]  [address] [place] [id number]  Phone: [phone] Fax: [phone]    [facility] [address] [facility] [address] [facility] [address]  [facility] [address]  Phone: [phone] Fax: [phone]    [id number] - [place], [place] - [address] [facility] [address]  [address]  [place] [address]  Phone: [phone] Fax: [phone]  : Yes    Medication tab checked to see if medication has been sent  Yes    Additional Comments: pt request refill   out of med would you please send [address]    OK to leave a message on voice mail? Yes    Advised patient refill may take up to 2 business days? Yes    Primary language: English      needed? No    Call taken on December 7, 2020 at 1:35 PM by Rod Villasenor    Route to P SMI MED REFILL

## 2020-12-07 NOTE — TELEPHONE ENCOUNTER

## 2020-12-07 NOTE — TELEPHONE ENCOUNTER
"Request for medication refill:Atorvastatin     Providers if patient needs an appointment and you are willing to give a one month supply please refill for one month and  send a letter/MyChart using \".SMILLIMITEDREFILL\" .smillimited and route chart to \"P SMI \" (Giving one month refill in non controlled medications is strongly recommended before denial)    If refill has been denied, meaning absolutely no refills without visit, please complete the smart phrase \".smirxrefuse\" and route it to the \"P SMI MED REFILLS\"  pool to inform the patient and the pharmacy.    Ariana Ba, [facility]        "

## 2021-01-05 ENCOUNTER — NURSE TRIAGE (OUTPATIENT)
Dept: FAMILY MEDICINE | Facility: CLINIC | Age: 61
End: 2021-01-05

## 2021-01-05 NOTE — TELEPHONE ENCOUNTER
[facility] Family Medicine phone call message-patient reporting a symptom:     Symptom: tooth pain  Broken tooth     When did symptoms begin? Last week    Characteristics: (location on body, intensity, what makes it better or worse, associated symptoms):      Additional Details: swelling       Same Day Visit Offered: Yes, declined    Additional comments:patient want to speak with a triage nurse regarding savere  Tooth pain  Want to know if dr castañeda  Was prescribe antibiotic please call    OK to leave message on voice mail? Yes    Advised patient that RN would call back within 3 hours, unless emergent   Primary language: English      needed? No    Call taken on January 5, 2021 at 1:10 PM by Rod Villasenor

## 2021-01-05 NOTE — TELEPHONE ENCOUNTER
"RN spoke to patient to discuss symptoms. Patient states she broke her tooth about a month ago. States she has been waiting to go see a dentist due to getting new dental insurance. Patient states she hasn't gotten her dental insurance or a new dentist yet. Patient states she is having severe pain and the gum around the tooth is swollen and red. Patient requesting Dr. Brasher send in prescription for antibiotic to help until she is able to get in to see a dentist. RN recommended patient call around to a dentist today and get seen ASAP by them for management of possible infection. RN also informed her of Dental UC with [facility]- patient declined stating she is worried about COVID and wants to wait until her insurance is in place. Patient requesting message be sent to Dr. Brasher to see if he would prescribe something for her. Routing to PCP as requested.   Ashley Alonso RN     Additional Information    Negative: Knocked out (unconscious) > 1 minute    Negative: Difficult to awaken or acting confused (e.g., disoriented, slurred speech)    Negative: SEVERE neck pain (e.g., excruciating)    Negative: Sounds like a life-threatening emergency to the triager    Negative: Tooth knocked out    Negative: Tooth is almost falling out    Negative: Bleeding won't stop after 10 minutes of direct pressure (using correct technique)    Negative: Sounds like a serious injury to the triager    SEVERE pain (e.g., excruciating)    Chipped tooth (piece missing)    Answer Assessment - Initial Assessment Questions  1. MECHANISM: \"How did the injury happen?\"       Broke her tooth   2. ONSET: \"When did the injury happen?\" (e.g., minutes or hours ago)       About a month ago  3. LOCATION: \"What part of the tooth is injured?\"       Piece broken off.   4. APPEARANCE: \"What does the tooth look like?\"       Broken tooth  5. BLEEDING: \"Is the mouth still bleeding?\" If so, ask: \"Is it difficult to stop?\"       Denies  6. PAIN: \"Is it painful?\" If so, ask: " "\"How bad is the pain?\"   (Scale 1-10; or mild, moderate, severe)      Yes severe  7. TETANUS: For any breaks in the skin, ask: \"When was the last tetanus booster?\"      Unsure  8. OTHER SYMPTOMS: \"Do you have any other symptoms?\"       Swollen gums  9. PREGNANCY: \"Is there any chance you are pregnant?\" \"When was your last menstrual period?\"      Denies    Protocols used: TOOTH INJURY-A-OH    "

## 2021-01-06 ENCOUNTER — VIRTUAL VISIT (OUTPATIENT)
Dept: FAMILY MEDICINE | Facility: CLINIC | Age: 61
End: 2021-01-06
Payer: COMMERCIAL

## 2021-01-06 DIAGNOSIS — K02.9 DENTAL CARIES: Primary | ICD-10-CM

## 2021-01-06 PROCEDURE — 99213 OFFICE O/P EST LOW 20 MIN: CPT | Mod: GT | Performed by: FAMILY MEDICINE

## 2021-01-06 RX ORDER — PENICILLIN V POTASSIUM 500 MG/1
500 TABLET, FILM COATED ORAL 2 TIMES DAILY
Qty: 20 TABLET | Refills: 0 | Status: SHIPPED | OUTPATIENT
Start: 2021-01-06 | End: 2021-01-07

## 2021-01-06 NOTE — PROGRESS NOTES
Zaira Ahn is a 60 year old female who is being evaluated via a billable video visit.      How would you like to obtain your AVS? Mail a copy  If the video visit is dropped, the invitation should be resent by:   Will anyone else be joining your video visit? No      Video Start Time: 9:55 AM  Assessment & Plan     Dental caries  Encouraged making an appointment with dentist ASAP. Patient expressed understanding and realizes she may need to have a tooth extraction.   - penicillin V (VEETID) 500 MG tablet  Dispense: 20 tablet; Refill: 0                25 minutes spent on the date of the encounter doing patient visit and documentation            MEDICATIONS:   Orders Placed This Encounter   Medications     penicillin V (VEETID) 500 MG tablet     Sig: Take 1 tablet (500 mg) by mouth 2 times daily for 10 days     Dispense:  20 tablet     Refill:  0          - Continue other medications without change    No follow-ups on file.    Wei Brasher MD  [facility]    Subjective     Zaira Ahn is a 60 year old who presents to clinic today for the following health issues Dental pain.     HPI   A month ago lost a filling in her left upper middle molar. Has been becoming more tender. Gum above tooth is tender and swollen. No significant fever. Ache is into her cheek and eyes. No fever. No HA, no CP, no ST.           Review of Systems   Constitutional, HEENT, cardiovascular, pulmonary, gi and gu systems are negative, except as otherwise noted.      Objective           Vitals:  No vitals were obtained today due to virtual visit.    Physical Exam   GENERAL: Healthy, alert and no distress  PSYCH: Mentation appears normal, affect normal/bright, judgement and insight intact, normal speech and appearance well-groomed.  Face not swollen              Video-Visit Details    Type of service:  Video Visit    Video End Time:10:15 AM    Originating Location (pt. Location): Home    Distant Location (provider  location):  [facility]     Platform used for Video Visit: Alexa

## 2021-01-06 NOTE — PATIENT INSTRUCTIONS
Here is the plan from today's visit    1. Dental caries  Complete the medication as below, and follow up with dentist ASAP.  - penicillin V (VEETID) 500 MG tablet; Take 1 tablet (500 mg) by mouth 2 times daily for 10 days  Dispense: 20 tablet; Refill: 0    Please call or return to clinic if your symptoms don't go away.    Follow up plan  Return in about 4 weeks (around 2/3/2021) for Diabetes Routine Check.     Thank you for coming to [facility] today.  Lab Testing:  **If you had lab testing today and your results are reassuring or normal they will be mailed to you or sent through [employer] within 7 days.   **If the lab tests need quick action we will call you with the results.  The phone number we will call with results is # [phone] (home) [phone] (work). If this is not the best number please call our clinic and change the number.  Medication Refills:  If you need any refills please call your pharmacy and they will contact us.   If you need to  your refill at a new pharmacy, please contact the new pharmacy directly. The new pharmacy will help you get your medications transferred faster.   Scheduling:  If you have any concerns about today's visit or wish to schedule another appointment please call our office during normal business hours [phone] (8-5:00 M-F)   eferrals to [facility] please call [phone].   Mammogram Scheduling [phone]     XRay/CT/Ultrasound/MRI Scheduling [phone]    Medical Concerns:  If you have urgent medical concerns please call [phone] at any time of the day.    Wei Brasher MD

## 2021-01-14 ENCOUNTER — HEALTH MAINTENANCE LETTER (OUTPATIENT)
Age: 61
End: 2021-01-14

## 2021-02-02 DIAGNOSIS — E11.41 DIABETIC MONONEUROPATHY ASSOCIATED WITH TYPE 2 DIABETES MELLITUS (H): ICD-10-CM

## 2021-02-02 NOTE — TELEPHONE ENCOUNTER

## 2021-02-03 RX ORDER — GABAPENTIN 300 MG/1
300 CAPSULE ORAL 2 TIMES DAILY
Qty: 180 CAPSULE | Refills: 0 | Status: SHIPPED | OUTPATIENT
Start: 2021-02-03 | End: 2021-07-14

## 2021-03-11 DIAGNOSIS — I10 ESSENTIAL HYPERTENSION: ICD-10-CM

## 2021-03-11 NOTE — TELEPHONE ENCOUNTER

## 2021-03-12 RX ORDER — ATENOLOL AND CHLORTHALIDONE TABLET 50; 25 MG/1; MG/1
1 TABLET ORAL DAILY
Qty: 90 TABLET | Refills: 0 | Status: SHIPPED | OUTPATIENT
Start: 2021-03-12 | End: 2021-06-22

## 2021-06-02 ENCOUNTER — OFFICE VISIT (OUTPATIENT)
Dept: FAMILY MEDICINE | Facility: CLINIC | Age: 61
End: 2021-06-02
Payer: COMMERCIAL

## 2021-06-02 ENCOUNTER — ANCILLARY PROCEDURE (OUTPATIENT)
Dept: GENERAL RADIOLOGY | Facility: CLINIC | Age: 61
End: 2021-06-02
Attending: FAMILY MEDICINE
Payer: COMMERCIAL

## 2021-06-02 VITALS
SYSTOLIC BLOOD PRESSURE: 134 MMHG | TEMPERATURE: 98.5 F | HEART RATE: 67 BPM | BODY MASS INDEX: 26.84 KG/M2 | OXYGEN SATURATION: 97 % | RESPIRATION RATE: 16 BRPM | HEIGHT: 66 IN | DIASTOLIC BLOOD PRESSURE: 85 MMHG | WEIGHT: 167 LBS

## 2021-06-02 DIAGNOSIS — T14.8XXA ABRASION OF SKIN: ICD-10-CM

## 2021-06-02 DIAGNOSIS — M25.512 ACUTE PAIN OF LEFT SHOULDER: Primary | ICD-10-CM
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
## 2021-06-02 PROCEDURE — 73030 X-RAY EXAM OF SHOULDER: CPT | Mod: LT | Performed by: RADIOLOGY

## 2021-06-02 PROCEDURE — 99214 OFFICE O/P EST MOD 30 MIN: CPT | Mod: GC | Performed by: STUDENT IN AN ORGANIZED HEALTH CARE EDUCATION/TRAINING PROGRAM

## 2021-06-02 RX ORDER — OXYCODONE HYDROCHLORIDE 5 MG/1
2.5 TABLET ORAL EVERY 6 HOURS PRN
Qty: 6 TABLET | Refills: 0 | Status: SHIPPED | OUTPATIENT
Start: 2021-06-02 | End: 2021-06-05

## 2021-06-02 ASSESSMENT — MIFFLIN-ST. JEOR: SCORE: 1340.26

## 2021-06-02 NOTE — PROGRESS NOTES
"    Assessment & Plan   Fall, initial encounter  Acute pain of left shoulder  Abrasion of skin  Fell down 2 cement steps (tripped over cat), sustained skin abrasions and hematomas to right knee and forefoot, left elbow and shoulder and scalp. No LOC. Continues to have left shoulder pain, weakness and decreased ROM. XR without obvious fracture or dislocation. Distal CMS intact.   - X-ray lt Shoulder G/E 3 vw  - MR Shoulder Left w/o Contrast; Future  - oxyCODONE (ROXICODONE) 5 MG tablet; Take 0.5 tablets (2.5 mg) by mouth every 6 hours as needed for severe pain  - PT vs surgery pending MRI read  - sling provided  [id number]}     BMI:   Estimated body mass index is 27.16 kg/m  as calculated from the following:    Height as of this encounter: 1.67 m (5' 5.75\").    Weight as of this encounter: 75.8 kg (167 lb).     Anna Song MD  [facility] CELESTINO MARTIN is a 60 year old who presents for the following health issues    HPI     Musculoskeletal problem/pain  Onset/Duration:   Over the weekend   Cat problem!   Tumbled down 2 stairs via tripping over cat.   Hit head, left shoulder, right knee. No LOC.   Took 10 minutes to get up.   Sunday very sore.   Taking advil, icing.   Cant sleep.     Tylenol with codeine makes her throw up. Has tolerated Oxycodone in past    Right handed, but uses left hand for mousing and calculating at work.     Dizziness afterwards, resolved. No headaches.     Description  Location: left shoulder   Joint Swelling: no  Redness: no  Pain: YES  Warmth: no  Intensity:  severe  Progression of Symptoms:  worsening and same  Accompanying signs and symptoms:   Fevers: no  Numbness/tingling/weakness: no  History  Trauma to the area: YES  Recent illness:  no  Previous similar problem: no  Previous evaluation:  no  Precipitating or alleviating factors:  Aggravating factors include: lifting and overuse  Therapies tried and outcome: rest/inactivity, ice and Ibuprofen  Review of " "Systems   Constitutional, HEENT, cardiovascular, pulmonary, gi and gu systems are negative, except as otherwise noted.      Objective    /85   Pulse 67   Temp 98.5  F (36.9  C) (Oral)   Resp 16   Ht 1.67 m (5' 5.75\")   Wt 75.8 kg (167 lb)   SpO2 97%   BMI 27.16 kg/m    Body mass index is 27.16 kg/m .  Physical Exam  Vitals signs and nursing note reviewed.   Constitutional:       General: She is not in acute distress.     Appearance: She is well-developed.   HENT:      Head: Normocephalic and atraumatic.      Comments: Hematoma on left scalp,  Neck:      Musculoskeletal: Normal range of motion.   Pulmonary:      Effort: Pulmonary effort is normal. No respiratory distress.   Musculoskeletal:      Left shoulder: She exhibits decreased range of motion, tenderness, laceration (abrasion), pain and decreased strength. She exhibits no swelling, no effusion and no deformity.      Comments: Rotator cuff motion (passive and active) and strength limited. Challenging exam due to pain. No obvious deformity, edema or erythema. TTP throughout shoulder. Abrasion anteriorly.    Skin:     General: Skin is warm and dry.      Comments: Abrasion right foot, bruise right knee.   Right hand, abrasion, left elbow bruising. Left shoulder abrasion.    Neurological:      General: No focal deficit present.      Mental Status: She is alert and oriented to person, place, and time.      Gait: Gait normal.         Xray - Reviewed and interpreted by me.  No fracture or dislocation      "

## 2021-06-04 NOTE — PROGRESS NOTES
Preceptor Attestation:   Patient seen, evaluated and discussed with the resident. I have verified the content of the note, which accurately reflects my assessment of the patient and the plan of care.   Supervising Physician:  Semaj Amezcua MD

## 2021-06-11 ENCOUNTER — HOSPITAL ENCOUNTER (OUTPATIENT)
Dept: MRI IMAGING | Facility: CLINIC | Age: 61
Discharge: HOME | End: 2021-06-11
Attending: FAMILY MEDICINE | Admitting: FAMILY MEDICINE
Payer: COMMERCIAL

## 2021-06-11 DIAGNOSIS — M25.512 ACUTE PAIN OF LEFT SHOULDER: ICD-10-CM

## 2021-06-11 PROCEDURE — 73221 MRI JOINT UPR EXTREM W/O DYE: CPT | Mod: LT

## 2021-06-11 PROCEDURE — 73221 MRI JOINT UPR EXTREM W/O DYE: CPT | Mod: 26 | Performed by: RADIOLOGY

## 2021-06-16 DIAGNOSIS — S46.012A TRAUMATIC INCOMPLETE TEAR OF LEFT ROTATOR CUFF, INITIAL ENCOUNTER: Primary | ICD-10-CM

## 2021-06-21 DIAGNOSIS — I10 ESSENTIAL HYPERTENSION: ICD-10-CM

## 2021-06-21 NOTE — TELEPHONE ENCOUNTER
"Request for medication refill:Atenolol    Providers if patient needs an appointment and you are willing to give a one month supply please refill for one month and  send a letter/MyChart using \".SMILLIMITEDREFILL\" .smillimited and route chart to \"P SMI \" (Giving one month refill in non controlled medications is strongly recommended before denial)    If refill has been denied, meaning absolutely no refills without visit, please complete the smart phrase \".smirxrefuse\" and route it to the \"P SMI MED REFILLS\"  pool to inform the patient and the pharmacy.    Ariana Ba, CMA        "

## 2021-06-22 RX ORDER — ATENOLOL AND CHLORTHALIDONE TABLET 50; 25 MG/1; MG/1
1 TABLET ORAL DAILY
Qty: 90 TABLET | Refills: 0 | Status: SHIPPED | OUTPATIENT
Start: 2021-06-22 | End: 2021-07-13

## 2021-07-13 DIAGNOSIS — I10 ESSENTIAL HYPERTENSION: ICD-10-CM

## 2021-07-13 DIAGNOSIS — E11.41 DIABETIC MONONEUROPATHY ASSOCIATED WITH TYPE 2 DIABETES MELLITUS (H): ICD-10-CM

## 2021-07-13 RX ORDER — ATENOLOL AND CHLORTHALIDONE TABLET 50; 25 MG/1; MG/1
1 TABLET ORAL DAILY
Qty: 90 TABLET | Refills: 0 | Status: SHIPPED | OUTPATIENT
Start: 2021-07-13 | End: 2022-01-28

## 2021-07-13 NOTE — TELEPHONE ENCOUNTER
"Request for medication refill:  atenolol-chlorthalidone (TENORETIC) 50-25 MG tablet    Providers if patient needs an appointment and you are willing to give a one month supply please refill for one month and  send a letter/MyChart using \".SMILLIMITEDREFILL\" .smillimited and route chart to \"P SMI \" (Giving one month refill in non controlled medications is strongly recommended before denial)    If refill has been denied, meaning absolutely no refills without visit, please complete the smart phrase \".smirxrefuse\" and route it to the \"P SMI MED REFILLS\"  pool to inform the patient and the pharmacy.    Wendy Esquivel MA        "

## 2021-07-14 RX ORDER — GABAPENTIN 300 MG/1
300 CAPSULE ORAL 2 TIMES DAILY
Qty: 180 CAPSULE | Refills: 0 | Status: SHIPPED | OUTPATIENT
Start: 2021-07-14 | End: 2022-03-09

## 2021-08-29 ENCOUNTER — HEALTH MAINTENANCE LETTER (OUTPATIENT)
Age: 61
End: 2021-08-29

## 2021-10-24 ENCOUNTER — HEALTH MAINTENANCE LETTER (OUTPATIENT)
Age: 61
End: 2021-10-24

## 2022-01-25 DIAGNOSIS — I10 ESSENTIAL HYPERTENSION: ICD-10-CM

## 2022-01-25 DIAGNOSIS — E11.9 TYPE 2 DIABETES MELLITUS WITHOUT COMPLICATION, WITHOUT LONG-TERM CURRENT USE OF INSULIN (H): ICD-10-CM

## 2022-01-25 RX ORDER — ATORVASTATIN CALCIUM 40 MG/1
40 TABLET, FILM COATED ORAL DAILY
Qty: 30 TABLET | Refills: 0 | Status: SHIPPED | OUTPATIENT
Start: 2022-01-25 | End: 2022-02-23

## 2022-01-25 RX ORDER — ATENOLOL AND CHLORTHALIDONE TABLET 50; 25 MG/1; MG/1
1 TABLET ORAL DAILY
Qty: 90 TABLET | Refills: 0 | Status: CANCELLED | OUTPATIENT
Start: 2022-01-25

## 2022-01-25 NOTE — TELEPHONE ENCOUNTER
Patient requesting refill atorvastatin refill. Last office visit 6/2/21. No follow up plan indicated. RN will refill for 30 days, routing to PCP for further refills.     Segun See RN

## 2022-01-25 NOTE — TELEPHONE ENCOUNTER
[facility] phone call message- patient requesting a refill:    Full Medication Name: Atorvastatin        Pharmacy confirmed as :  [facility] #[id number] - [address] AT [address] [address]  Phone: [phone] Fax: [phone]   Yes    Additional Comments: Patient is out of medication     OK to leave a message on voice mail? Yes    Primary language: English      needed? No    Call taken on January 25, 2022 at 1:01 PM by Kimberli Yancey CMA

## 2022-01-28 DIAGNOSIS — I10 ESSENTIAL HYPERTENSION: ICD-10-CM

## 2022-01-28 RX ORDER — ATENOLOL AND CHLORTHALIDONE TABLET 50; 25 MG/1; MG/1
1 TABLET ORAL DAILY
Qty: 30 TABLET | Refills: 0 | Status: SHIPPED | OUTPATIENT
Start: 2022-01-28 | End: 2022-03-02

## 2022-01-28 NOTE — TELEPHONE ENCOUNTER
Patient requesting refill of atenolol-chlorthalidone 50-25 mg. Last office visit 6/2/21 with Dr. Song- previous refill states patient due for follow up. Called and spoke with patient- requesting appointment with PCP for full physical. Scheduled for 2/25/22. RN will send 1 month supply per protocol to last until appointment as she is out.   Ashley Alonso RN

## 2022-01-28 NOTE — TELEPHONE ENCOUNTER
[facility] phone call message- patient requesting a refill:    Full Medication Name:atenolol-chlorthalidone (TENORETIC) 50-25 MG tablet        Pharmacy confirmed as   [facility] #[id number] - [address] AT [address] [address]  Phone: [phone] Fax: [phone]    : Yes    Additional Comments: completely out      OK to leave a message on voice mail? Yes    Primary language: English      needed? No    Call taken on January 28, 2022 at 2:47 PM by Margo Villasenor

## 2022-02-13 ENCOUNTER — HEALTH MAINTENANCE LETTER (OUTPATIENT)
Age: 62
End: 2022-02-13

## 2022-02-23 DIAGNOSIS — E11.9 TYPE 2 DIABETES MELLITUS WITHOUT COMPLICATION, WITHOUT LONG-TERM CURRENT USE OF INSULIN (H): ICD-10-CM

## 2022-02-23 RX ORDER — ATORVASTATIN CALCIUM 40 MG/1
40 TABLET, FILM COATED ORAL DAILY
Qty: 30 TABLET | Refills: 0 | Status: SHIPPED | OUTPATIENT
Start: 2022-02-23 | End: 2022-03-14

## 2022-02-28 DIAGNOSIS — I10 ESSENTIAL HYPERTENSION: ICD-10-CM

## 2022-02-28 NOTE — TELEPHONE ENCOUNTER
Patient requesting ateonol-chlorthalidone refill. RN unable to refill as patient needs appt before further refills. Appt scheduled for 3/9. Routing to PCP to refill if appropriate.     Segun See RN

## 2022-02-28 NOTE — TELEPHONE ENCOUNTER
"Verify that the refill encounter hasn't been started Yes    [facility] phone call message- patient requesting a refill:    Full Medication Name:   atenolol-chlorthalidone (TENORETIC) 50-25 MG tablet 30 tablet          Dose: Sig - Route: Take 1 tablet by mouth daily Needs follow up for further refills - Oral     Pharmacy confirmed as   [facility] #[id number] [address] AT [address] [address]  Phone: [phone] Fax: [phone]    : Yes    Medication tab checked to see if medication has been sent  Yes    Additional Comments: Patient all out of medication. \"I can feel my heartbeat in my head, so I know I need it \".   Has appt scheduled next week.    OK to leave a message on voice mail? Yes    Advised patient refill may take up to 2 business days? Yes    Primary language: English      needed? No    Call taken on February 28, 2022 at 8:55 AM by Meaghan Knox    Route to P [facility] MED REFILL    "

## 2022-03-02 RX ORDER — ATENOLOL AND CHLORTHALIDONE TABLET 50; 25 MG/1; MG/1
1 TABLET ORAL DAILY
Qty: 30 TABLET | Refills: 0 | Status: SHIPPED | OUTPATIENT
Start: 2022-03-02 | End: 2022-03-09

## 2022-03-06 NOTE — PROGRESS NOTES
SUBJECTIVE:   CC: Zaira Ahn is an 61 year old woman who presents for preventive health visit.     Patient has been advised of split billing requirements and indicates understanding: Yes     HPI    Today's PHQ-2 Score:   PHQ-2 (  [employer]) 3/9/2022   Q1: Little interest or pleasure in doing things 3   Q2: Feeling down, depressed or hopeless 3   PHQ-2 Score 6   PHQ-2 Total Score (12-17 Years)- Positive if 3 or more points; Administer PHQ-A if positive -       Social History     Tobacco Use     Smoking status: Light Tobacco Smoker     Smokeless tobacco: Never Used   Substance Use Topics     Alcohol use: Yes   Drinkin/5 pint/day vodka, after work, helping to sleep, knows that it is more than she tabatha like    Reviewed orders with patient.  Reviewed health maintenance and updated orders accordingly - Yes  Lab work is in process    Breast Cancer Screening:  Any new diagnosis of family breast, ovarian, or bowel cancer? No    Mammogram Screening: Recommended mammography every 1-2 years with patient discussion and risk factor consideration  Pertinent mammograms are reviewed under the imaging tab.    History of abnormal Pap smear: NO - age 30-65 PAP every 5 years with negative HPV co-testing recommended    Lost 40lbs over the last two years, COVID with ongoing loss of taste and smell (food doesn't taste as good), son  in July. 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     Feet feel better with weight loss so not taking Gabapentin    Zoloft 25mg a couple of years ago, thinks that with son's death, anxiety has gotten worse    Wants a tummy tuck for excess skin- renetta pastic surgery    ROXANN-7 SCORE 2014 3/9/2022   Total Score 9 -   Total Score - 8       PHQ 3/9/2022   PHQ-9 Total Score 12   Q9: Thoughts of better off dead/self-harm past 2 weeks Not at all        Reviewed and updated as needed this visit by clinical staff   Tobacco  Allergies  Meds  Problems  Med Hx  Surg Hx   "Fam Hx  Soc   Hx        Reviewed and updated as needed this visit by Provider   Tobacco  Allergies  Meds  Problems  Med Hx  Surg Hx  Fam Hx           Review of Systems  CONSTITUTIONAL: NEGATIVE for fever, chills, change in weight unintentional  INTEGUMENTARY/SKIN: NEGATIVE for worrisome rashes, moles or lesions  EYES: NEGATIVE for vision changes or irritation  RESP: NEGATIVE for significant cough or SOB  CV: NEGATIVE for chest pain, palpitations or peripheral edema  GI: NEGATIVE for nausea, abdominal pain, heartburn, or change in bowel habits  : NEGATIVE for unusual urinary or vaginal symptoms. No vaginal bleeding.  MUSCULOSKELETAL: NEGATIVE for significant arthralgias or myalgia  PSYCHIATRIC: endorses worsening anxiety since the loss of her son this past summer     OBJECTIVE:   BP (!) 151/92   Pulse 80   Temp 98  F (36.7  C) (Oral)   Resp 16   Ht 1.65 m (5' 4.96\")   Wt 70.9 kg (156 lb 6.4 oz)   SpO2 96%   BMI 26.06 kg/m       Physical Exam  GENERAL APPEARANCE: healthy, alert and no distress  RESP: lungs clear to auscultation - no rales, rhonchi or wheezes  BREAST: deferred breast exam as getting mammogram this year  CV: regular rate and rhythm, normal S1 S2, no S3 or S4, no murmur, click or rub, no peripheral edema and peripheral pulses strong  ABDOMEN: soft, nontender, no hepatosplenomegaly, no masses and bowel sounds normal   (female): normal female external genitalia, normal urethral meatus, no vaginal mucosal atrophy noted, normal adnexae, and uterus without masses or abnormal discharge, cervix with cellular discoloration 10-12oclock  MS: no musculoskeletal defects are noted and gait is age appropriate without ataxia  SKIN: no suspicious lesions or rashes  NEURO: Normal strength and tone, sensory exam grossly normal, mentation intact and speech normal  PSYCH: mentation appears normal, affect normal/bright and tearful when discussing loss of her son    Diagnostic Test Results:  Results for " orders placed or performed in visit on 03/09/22 (from the past 24 hour(s))   Hemoglobin A1c   Result Value Ref Range    Hemoglobin A1C 5.9 (H) 0.0 - 5.6 %       ASSESSMENT/PLAN:   Zaira was seen today for physical and pap:    Routine general medical examination at a health care facility  Routine exam today, completed Pap with HPV (no history of abnormal).  Due for mammogram (has not had one before), colon cancer screening; discussed options and prefers colonoscopy.  No family history of breast or colon cancer.  Discussed diet exercise, healthy lifestyle changes. Has lots of excess skin around abdomen since weight loss and is interested in tummy tuck surgery to improve self esteem.  - mammogram  -     Lipid panel; Future  -     PAP screen with HPV - recommended age 30 - 65 years  -     Adult Gastro Ref - Procedure Only; Future    Depression with anxiety  Zaira has been experiencing increased anxiety with the recent death of her son in July 2021. PHQ9=12, GAD7=8, no current SI. Her  has retired and she is the primary earner of the household.  Expressed challenges with this transition, supporting her  and his grief, as well as taking care of herself.  Has noticed an increase in alcohol use, only at night while at home, not missing work or drinking during the day.  Is interested in short-term therapy, in person preferred. Has been on sertraline in the past but has not taken for a couple of years; open to restarting today with possible increase in 6 weeks.  -6-week follow-up  -     sertraline (ZOLOFT) 25 MG tablet; Take 1 tablet (25 mg) by mouth daily  -     Adult Mental Health  Referral; Future    Type 2 diabetes mellitus without complication, without long-term current use of insulin (H)  Diet controlled, last A1c 6.1 in 2019.  Has had weight loss of about 40 pounds since then, combination of Covid, grief, change in diet.  Discussed labs today and will contact if recommending restarting  "Metformin, but she would prefer to be off it if possible.  -     Hemoglobin A1c; Future  -     Basic metabolic panel; Future  -     Albumin Random Urine Quantitative with Creat Ratio; Future  -     Adult Eye Referral; Future    Essential hypertension  Hypertensive in clinic today, does not check her blood pressures at home.  No headache change in vision or any new symptoms.  Will check her blood pressure every few days over the next couple of weeks and bring these records to appointment in 6 weeks; will increase blood pressure medication at that time if indicated.  -     atenolol-chlorthalidone (TENORETIC) 50-25 MG tablet; Take 1 tablet by mouth daily    Diabetic mononeuropathy associated with type 2 diabetes mellitus (H)  Stopped taking gabapentin for neuropathy, as she noticed that with weight loss her symptoms improved.      Patient has been advised of split billing requirements and indicates understanding: Yes    COUNSELING:  Reviewed preventive health counseling, as reflected in patient instructions       Regular exercise       Healthy diet/nutrition       (Nancy)menopause management    Estimated body mass index is 26.06 kg/m  as calculated from the following:    Height as of this encounter: 1.65 m (5' 4.96\").    Weight as of this encounter: 70.9 kg (156 lb 6.4 oz).      Counseling Resources:  ATP IV Guidelines  Pooled Cohorts Equation Calculator  Breast Cancer Risk Calculator  BRCA-Related Cancer Risk Assessment: FHS-7 Tool  FRAX Risk Assessment  ICSI Preventive Guidelines  Dietary Guidelines for Americans, 2010  USDA's MyPlate  ASA Prophylaxis  Lung CA Screening    DO CHINA Robertson [facility] [address] CELESTINO  "

## 2022-03-06 NOTE — PATIENT INSTRUCTIONS
Patient Education   Here is the plan from today's visit    1. Routine general medical examination at a health care facility    - Basic metabolic panel; Future  - Albumin Random Urine Quantitative with Creat Ratio; Future  - Lipid panel; Future  - Adult Eye Referral; Future  - PAP screen with HPV - recommended age 30 - 65 years  - Adult Gastro Ref - Procedure Only; Future    2. Depression with anxiety  Please start taking daily and check in with me in 6 week to see how it's going  - sertraline (ZOLOFT) 25 MG tablet; Take 1 tablet (25 mg) by mouth daily  Dispense: 30 tablet; Refill: 11  - Adult Mental Health  Referral; Future    3. Type 2 diabetes mellitus without complication, without long-term current use of insulin (H)  We will see how your labs look today and determine whether or not restarting Metformin is neccessary  - Hemoglobin A1c; Future  - Basic metabolic panel; Future  - Albumin Random Urine Quantitative with Creat Ratio; Future  - Adult Eye Referral; Future    4. Essential hypertension  Start checking and recording your BP a few times/week for the next few weeks. We will revisit this in 6 weeks and see if you need an increase in your medication  - atenolol-chlorthalidone (TENORETIC) 50-25 MG tablet; Take 1 tablet by mouth daily  Dispense: 30 tablet; Refill: 3    Please call or return to clinic if your symptoms don't go away.    Follow up plan  No follow-ups on file.    Thank you for coming to [facility] today.  Lab Testing:  **If you had lab testing today and your results are reassuring or normal they will be mailed to you or sent through [facility] within 7 days.   **If the lab tests need quick action we will call you with the results.  **If you are having labs done on a different day, please call [phone] to schedule at [facility] or [phone] for other [facility] locations. Labs do not offer walk-in appointments.  The phone number we will call with results is #  [phone] (home) [phone] (work). If this is not the best number please call our clinic and change the number.  Medication Refills:  If you need any refills please call your pharmacy and they will contact us.   If you need to  your refill at a new pharmacy, please contact the new pharmacy directly. The new pharmacy will help you get your medications transferred faster.   Scheduling:  If you have any concerns about today's visit or wish to schedule another appointment please call our office during normal business hours [phone] (8-5:00 M-F)  If a referral was made to an [facility] specialty provider and you do not get a call from central scheduling, please refer to directions on your visit summary or call our office during normal business hours for assistance.   If a Mammogram was ordered for you at the Breast Center call [phone] to schedule or change your appointment.  If you had an XRay/CT/Ultrasound/MRI ordered the number is [phone] to schedule or change your radiology appointment.   [facility] has limited ultrasound appointments available on Wednesdays, if you would like your ultrasound at [facility], please call [phone] to schedule.   Medical Concerns:  If you have urgent medical concerns please call [phone] at any time of the day.    Safia Coon, DO         Preventive Health Recommendations  Female Ages 50 - 64    Yearly exam: See your health care provider every year in order to  o Review health changes.   o Discuss preventive care.    o Review your medicines if your doctor has prescribed any.      Get a Pap test every three years (unless you have an abnormal result and your provider advises testing more often).    If you get Pap tests with HPV test, you only need to test every 5 years, unless you have an abnormal result.     You do not need a Pap test if your uterus was removed (hysterectomy) and you have not had cancer.    You should be tested each year  for STDs (sexually transmitted diseases) if you're at risk.     Have a mammogram every 1 to 2 years.    Have a colonoscopy at age 50, or have a yearly FIT test (stool test). These exams screen for colon cancer.      Have a cholesterol test every 5 years, or more often if advised.    Have a diabetes test (fasting glucose) every three years. If you are at risk for diabetes, you should have this test more often.     If you are at risk for osteoporosis (brittle bone disease), think about having a bone density scan (DEXA).    Shots: Get a flu shot each year. Get a tetanus shot every 10 years.    Nutrition:     Eat at least 5 servings of fruits and vegetables each day.    Eat whole-grain bread, whole-wheat pasta and brown rice instead of white grains and rice.    Get adequate Calcium and Vitamin D.     Lifestyle    Exercise at least 150 minutes a week (30 minutes a day, 5 days a week). This will help you control your weight and prevent disease.    Limit alcohol to one drink per day.    No smoking.     Wear sunscreen to prevent skin cancer.     See your dentist every six months for an exam and cleaning.    See your eye doctor every 1 to 2 years.

## 2022-03-09 ENCOUNTER — OFFICE VISIT (OUTPATIENT)
Dept: FAMILY MEDICINE | Facility: CLINIC | Age: 62
End: 2022-03-09
Payer: COMMERCIAL

## 2022-03-09 VITALS
OXYGEN SATURATION: 96 % | TEMPERATURE: 98 F | WEIGHT: 156.4 LBS | HEART RATE: 80 BPM | SYSTOLIC BLOOD PRESSURE: 151 MMHG | RESPIRATION RATE: 16 BRPM | DIASTOLIC BLOOD PRESSURE: 92 MMHG | HEIGHT: 65 IN | BODY MASS INDEX: 26.06 KG/M2

## 2022-03-09 DIAGNOSIS — Z00.00 ROUTINE GENERAL MEDICAL EXAMINATION AT A HEALTH CARE FACILITY: Primary | ICD-10-CM

## 2022-03-09 DIAGNOSIS — I10 ESSENTIAL HYPERTENSION: ICD-10-CM

## 2022-03-09 DIAGNOSIS — F41.8 DEPRESSION WITH ANXIETY: ICD-10-CM

## 2022-03-09 DIAGNOSIS — E11.9 TYPE 2 DIABETES MELLITUS WITHOUT COMPLICATION, WITHOUT LONG-TERM CURRENT USE OF INSULIN (H): ICD-10-CM

## 2022-03-09 DIAGNOSIS — E11.41 DIABETIC MONONEUROPATHY ASSOCIATED WITH TYPE 2 DIABETES MELLITUS (H): ICD-10-CM

## 2022-03-09 LAB
ANION GAP SERPL CALCULATED.3IONS-SCNC: 4 MMOL/L (ref 3–14)
BUN SERPL-MCNC: 17 MG/DL (ref 7–30)
CALCIUM SERPL-MCNC: 9.5 MG/DL (ref 8.5–10.1)
CHLORIDE BLD-SCNC: 98 MMOL/L (ref 94–109)
CHOLEST SERPL-MCNC: 268 MG/DL
CO2 SERPL-SCNC: 34 MMOL/L (ref 20–32)
CREAT SERPL-MCNC: 0.66 MG/DL (ref 0.52–1.04)
CREAT UR-MCNC: 344 MG/DL
FASTING STATUS PATIENT QL REPORTED: ABNORMAL
GFR SERPL CREATININE-BSD FRML MDRD: >90 ML/MIN/1.73M2
GLUCOSE BLD-MCNC: 116 MG/DL (ref 70–99)
HBA1C MFR BLD: 5.9 % (ref 0–5.6)
HDLC SERPL-MCNC: 51 MG/DL
LDLC SERPL CALC-MCNC: 170 MG/DL
MICROALBUMIN UR-MCNC: 57 MG/L
MICROALBUMIN/CREAT UR: 16.57 MG/G CR (ref 0–25)
NONHDLC SERPL-MCNC: 217 MG/DL
POTASSIUM BLD-SCNC: 3.4 MMOL/L (ref 3.4–5.3)
SODIUM SERPL-SCNC: 136 MMOL/L (ref 133–144)
TRIGL SERPL-MCNC: 237 MG/DL

## 2022-03-09 PROCEDURE — G0145 SCR C/V CYTO,THINLAYER,RESCR: HCPCS | Performed by: STUDENT IN AN ORGANIZED HEALTH CARE EDUCATION/TRAINING PROGRAM

## 2022-03-09 PROCEDURE — 99396 PREV VISIT EST AGE 40-64: CPT | Mod: 25 | Performed by: STUDENT IN AN ORGANIZED HEALTH CARE EDUCATION/TRAINING PROGRAM

## 2022-03-09 PROCEDURE — 80061 LIPID PANEL: CPT | Performed by: STUDENT IN AN ORGANIZED HEALTH CARE EDUCATION/TRAINING PROGRAM

## 2022-03-09 PROCEDURE — 80048 BASIC METABOLIC PNL TOTAL CA: CPT | Performed by: STUDENT IN AN ORGANIZED HEALTH CARE EDUCATION/TRAINING PROGRAM

## 2022-03-09 PROCEDURE — 82043 UR ALBUMIN QUANTITATIVE: CPT | Performed by: STUDENT IN AN ORGANIZED HEALTH CARE EDUCATION/TRAINING PROGRAM

## 2022-03-09 PROCEDURE — 36415 COLL VENOUS BLD VENIPUNCTURE: CPT | Performed by: STUDENT IN AN ORGANIZED HEALTH CARE EDUCATION/TRAINING PROGRAM

## 2022-03-09 PROCEDURE — 83036 HEMOGLOBIN GLYCOSYLATED A1C: CPT | Performed by: STUDENT IN AN ORGANIZED HEALTH CARE EDUCATION/TRAINING PROGRAM

## 2022-03-09 RX ORDER — SERTRALINE HYDROCHLORIDE 25 MG/1
25 TABLET, FILM COATED ORAL DAILY
Qty: 30 TABLET | Refills: 11 | Status: SHIPPED | OUTPATIENT
Start: 2022-03-09 | End: 2024-02-21

## 2022-03-09 RX ORDER — ATENOLOL AND CHLORTHALIDONE TABLET 50; 25 MG/1; MG/1
1 TABLET ORAL DAILY
Qty: 30 TABLET | Refills: 3 | Status: SHIPPED | OUTPATIENT
Start: 2022-03-09 | End: 2022-06-28

## 2022-03-09 ASSESSMENT — ANXIETY QUESTIONNAIRES
3. WORRYING TOO MUCH ABOUT DIFFERENT THINGS: SEVERAL DAYS
7. FEELING AFRAID AS IF SOMETHING AWFUL MIGHT HAPPEN: NOT AT ALL
1. FEELING NERVOUS, ANXIOUS, OR ON EDGE: NEARLY EVERY DAY
6. BECOMING EASILY ANNOYED OR IRRITABLE: MORE THAN HALF THE DAYS
2. NOT BEING ABLE TO STOP OR CONTROL WORRYING: MORE THAN HALF THE DAYS
5. BEING SO RESTLESS THAT IT IS HARD TO SIT STILL: NOT AT ALL
IF YOU CHECKED OFF ANY PROBLEMS ON THIS QUESTIONNAIRE, HOW DIFFICULT HAVE THESE PROBLEMS MADE IT FOR YOU TO DO YOUR WORK, TAKE CARE OF THINGS AT HOME, OR GET ALONG WITH OTHER PEOPLE: SOMEWHAT DIFFICULT
GAD7 TOTAL SCORE: 8

## 2022-03-09 ASSESSMENT — PATIENT HEALTH QUESTIONNAIRE - PHQ9
SUM OF ALL RESPONSES TO PHQ QUESTIONS 1-9: 12
5. POOR APPETITE OR OVEREATING: NOT AT ALL

## 2022-03-10 ASSESSMENT — ANXIETY QUESTIONNAIRES: GAD7 TOTAL SCORE: 8

## 2022-03-10 NOTE — PROGRESS NOTES
Preceptor Attestation:   Patient seen, evaluated and discussed with the resident. I have verified the content of the note, which accurately reflects my assessment of the patient and the plan of care.   Supervising Physician:  Morelia Kc, DO

## 2022-03-11 LAB
BKR LAB AP GYN ADEQUACY: NORMAL
BKR LAB AP GYN INTERPRETATION: NORMAL
BKR LAB AP HPV REFLEX: NO
BKR LAB AP LMP: NORMAL
BKR LAB AP PREVIOUS ABNORMAL: NORMAL
PATH REPORT.COMMENTS IMP SPEC: NORMAL
PATH REPORT.COMMENTS IMP SPEC: NORMAL
PATH REPORT.RELEVANT HX SPEC: NORMAL

## 2022-03-14 ENCOUNTER — MYC REFILL (OUTPATIENT)
Dept: FAMILY MEDICINE | Facility: CLINIC | Age: 62
End: 2022-03-14
Payer: COMMERCIAL

## 2022-03-14 DIAGNOSIS — E11.9 TYPE 2 DIABETES MELLITUS WITHOUT COMPLICATION, WITHOUT LONG-TERM CURRENT USE OF INSULIN (H): ICD-10-CM

## 2022-03-15 RX ORDER — ATORVASTATIN CALCIUM 40 MG/1
40 TABLET, FILM COATED ORAL DAILY
Qty: 30 TABLET | Refills: 0 | Status: SHIPPED | OUTPATIENT
Start: 2022-03-15 | End: 2024-02-21

## 2022-03-15 NOTE — TELEPHONE ENCOUNTER

## 2022-03-18 ENCOUNTER — TELEPHONE (OUTPATIENT)
Dept: PSYCHOLOGY | Facility: CLINIC | Age: 62
End: 2022-03-18
Payer: COMMERCIAL

## 2022-03-18 NOTE — TELEPHONE ENCOUNTER
Referral was sent by provider to central scheduling so we did not see this previously. Patient states was offered an appointment in August.     It looks like Dr. Bagley has some openings next week, could you please reach out to her and try to schedule her with them? Thank you!

## 2022-03-21 NOTE — TELEPHONE ENCOUNTER
Called Zaira today 3/21/2022, spoke with her. Scheduled her with Dr. Bagley in-person tomorrow 3/22/22 at 9am.     Kailey Hamilton, [facility] Social Work Care Coordinator

## 2022-03-22 ENCOUNTER — OFFICE VISIT (OUTPATIENT)
Dept: PSYCHOLOGY | Facility: CLINIC | Age: 62
End: 2022-03-22
Payer: COMMERCIAL

## 2022-03-22 DIAGNOSIS — F32.A DEPRESSION, UNSPECIFIED DEPRESSION TYPE: Primary | ICD-10-CM

## 2022-03-22 DIAGNOSIS — Z63.4 UNCOMPLICATED BEREAVEMENT: ICD-10-CM

## 2022-03-22 PROCEDURE — 90834 PSYTX W PT 45 MINUTES: CPT | Mod: HN | Performed by: STUDENT IN AN ORGANIZED HEALTH CARE EDUCATION/TRAINING PROGRAM

## 2022-03-22 SDOH — SOCIAL STABILITY - SOCIAL INSECURITY: DISSAPEARANCE AND DEATH OF FAMILY MEMBER: Z63.4

## 2022-03-22 NOTE — PROGRESS NOTES
Behavioral Health Progress Note    Client's Legal Name: Zaira Ahn    Client's Preferred Name: Zaira  YOB: 1960  Type of Service: in-person, counseling intake  Length of Service:   Start time: 9:00 AM    End time: 9:40 AM   Duration: 40 minutes  Attendees: patient    Identifying Information and Presenting Problem:  Zaira is a 61 year old female who is being seen for problematic symptoms of depressed mood, grief, and increased alcohol use. Her son  by suicide several months ago and was found by Zaira and her . Other stressors are that  recently retired and she is working six days a week. She has a history of depression with anxiety.    Treatment Objective(s) Addressed in This Session:  Introduction to services/integrated care, informed consent, rapport building, information gathering    Progress on / Status of Treatment Objective(s) / Homework:  N/a - patient not previously known to me.    Mental Health Screening Questionnaires:  PHQ-9:   PHQ 3/9/2022   PHQ-9 Total Score 12   Q9: Thoughts of better off dead/self-harm past 2 weeks Not at all      ROXANN-7:   ROXANN-7 SCORE 2014 3/9/2022   Total Score 9 -   Total Score - 8     Topics Discussed/Interventions Provided:  This was my first visit with Zaira. We reviewed her rights to and the limits of confidentiality. This discussion also included an overview of integrated care as well as the role of open notes in the electronic health record. Zaira was also informed of my position as a post-doctoral fellow and given my supervisor's contact information. Patient was encouraged to ask questions and verbally consented to services provided today. A letter with more detail about informed consent and services was provided to patient as part of the after visit summary.    Zaira described being encouraged to seek therapy consult by her primary care provider after disclosing she had been drinking more than is typical. Zaira  "shared that she consumes several glasses of wine every night. This increased substance use has not interfered with daily life; however, it has taken a toll on relationships with family and physically. Provided empathy and validation as she shared feelings of grief and loss. \"It's like I don't have my own space to grieve\" she shared discussing how she struggles to find time to herself when she gets home. Family members have commented negative things about her drinking leading to feelings of shame and guilt. Provided some education about low risk drinking and grief which Zaira shared was valuable.    Assessment:   The patient appeared to be active and engaged in today's session and was receptive to feedback. Zaira is very committed to those around her and often put their needs before her own. Therapy will undoubtedly be difficult for her and attention should be taken to problem solve and enhance motivation related to practicing therapy homework outside of session.    Mental Status:   During interaction with the examiner today, Zaira was cooperative, open and easy to engage with. Patient was generally alert and oriented to person, place, time, and situation. They were casually dressed, well groomed and appeared stated age. Patient's attire was appropriate for the weather and occasion. Eye contact was good. Psychomotor functioning: fidgety. Speech was normal limits tone, rate, volume; largely coherent and relevant to topic. Mood was \"okay\" and grieving; affect was mood-congruent. Thought processes were unremarkable. Thought content was not remarkable with no evidence of psychotic features and no evidence of suicide, homicide, or nonsuicidal self injury related thoughts, intent, urges, planning, behavior/recent attempts. Memory appeared grossly intact without being formally evaluated. Insight: good. Judgment was good. Patient exhibited good impulse control during the appointment.     Does the patient appear to be " at imminent risk of harm to self/others at this time? No    The session was necessary to address symptoms of grief and increased substance use that have been interfering with patient's ability to function in areas related to work, interacting and relating with others and low risk substance use. Ongoing psychotherapy is necessary to provide counseling and reduce problematic substance use.    DSM-5 Diagnosis (provisional):  Depressive Disorder, unspecified  Uncomplicated Bereavement  R/o Alcohol Use Disorder    Plan:  1. Follow up with this provider 3/29/2022 @ 8:20 AM in clinic  2. Work on goals as noted in patient instructions.  3. Utilize crisis resources as needed.  4. After Visit Summary will be reviewed in Karla Bagley Psy.D.  they/them/theirs  Primary Care Behavioral Health Fellow    NOTE: Treatment plan due within first three sessions. Diagnostic assessment/update will be completed at next session with this provider.

## 2022-03-29 ENCOUNTER — TELEPHONE (OUTPATIENT)
Dept: PSYCHOLOGY | Facility: CLINIC | Age: 62
End: 2022-03-29
Payer: COMMERCIAL

## 2022-03-29 PROCEDURE — 99207 PR NO CHARGE LOS: CPT | Performed by: STUDENT IN AN ORGANIZED HEALTH CARE EDUCATION/TRAINING PROGRAM

## 2022-04-04 NOTE — PATIENT INSTRUCTIONS
It was a pleasure to work with you today Zaira!    Our next scheduled appointment(s): 3/29/2022 @ 8:20 AM in clinic    If you need to change a future appointment for any reason, the most efficient way to do so is to call the  at [phone].    Kiah Bagley Psy.D.  they/them/theirs  Primary Care Behavioral Health Fellow  ^^^^^^^^^^^^^^^^^^^^^^^^^^^  Your safety as well as the safety of those around you is important to us. Should you or someone else be in need of them, here are some additional resources:    Feeling overwhelmed and just need a supportive person to talk through a struggling time? (hours 12 PM - 10 PM CST)  Toll Free [phone] or text  Support  to 54623  The [place] Warmline provides a mcuo-oh-hfwd approach to mental health recovery, support and wellness. Calls are answered by professionally trained Certified Peer Specialists, who have first hand experience living with a mental health condition. (hours 12 PM - 10 PM CST)    Do you feel like you might be in crisis and potentially need professional services?   [facility] Adult Mental Health Crisis:  [phone]  [facility] Childrens Mental Health Crisis: [phone]    [facility] Adult Mental Health Crisis: [phone]  [facility] Children's Mental Health Crisis: [phone]    [facility] Multilingual Crisis Line:  [phone]     Crisis Text Line: People who text [place] to 169453 will be connected with a counselor.     [place] Domestic Violence Crisis Line (24-hour [place] crisis line) 1-291.259.2227    Find a local Alcohol or Narcotics Anonymous meeting:  Https://aam[place].org/  https://www.nam[place].org/    If you feel at risk of immediate harm, call 9-1-1 or go directly to the Emergency Department.  ^^^^^^^^^^^^^^^^^^^^^^^^^^^^^^  [facility]  Behavioral Health and Addiction Services  [address] [place] [address]  [phone]    First Session Patient Information  Sheet    My name is Kiah Bagley Psy.D. and I look forward to working with you! I earned my doctorate in Counseling Psychology at [employer]. I am currently in a postdoctoral fellowship here at [facility] to receive specialized training in primary care behavioral health. I am also working to complete my supervised hours to become a licensed psychologist in the [facility].     My direct supervisor at the clinic is licensed psychologist, Christina Sherman, Ph.D. She and I meet individually each week to discuss my training and clinical caseload. Just like the residents you may have worked with, the work you and I complete together will be billed under my supervisor's name. Therefore, you will likely see reports from your insurance with Dr. Sherman's name rather than mine. Dr. Sherman can be reached at [phone] if you have any questions or concerns.     Here is some general information about behavioral health services, please note that your exact experience may be different based on our discussion today.      Your first 1-2 sessions are focused on assessment That means we will be exploring what brings you in today and what you are hoping to get out of behavioral health services. I will likely ask you a lot of questions about your current symptoms, health history, as well as information about your relationships, emotions, behaviors, experiences, childhood, family of origin, etc. I may also ask you to complete several questionnaires and checklists as part of that assessment process.      When the assessment is completed, will review the results and work together to develop a plan for treatment. This discussion will include recommendations about what services are most appropriate for your circumstances based on available research and practice guidelines. Many times, I will continue with your care unless it becomes clear that we need to refer you to another provider or  organization better suited to meet your specific needs. We may also discuss other services that you may benefit from engaging with such as social work team or behavioral health home.      Most patients attend appointments once a week or once every other week at the beginning of treatment. However, we will discuss a schedule that best fits your needs.       Due to confidentiality, I cannot exchange e-mail with patients. If you need to reach me, please leave a message for me at the  [phone].    I am committed to providing my patients with the best care possible. That means it is important to me that I provider services that fit for your unique needs and preferences. If there is anything you would like me to do differently, please let me know at any time!    Thank you, and I look forward to working with you!    Kiah Bagley, PsThomas.  they/them/theirs  Primary Care Behavioral Health Fellow

## 2022-04-04 NOTE — PROGRESS NOTES
Preceptor Attestation:  I have reviewed and agree with the behavioral health fellow's documentation for this visit.  I did not see the patient.  Supervising Clinical Psychologist:  Christina Sherman PSYD LP

## 2022-04-05 NOTE — TELEPHONE ENCOUNTER
Placed a call to Zaira after she did not check-in for scheduled in-person counseling appointment at 8:20 AM on 4/5/2022. This is patient's first no-show or late cancellation with this provider.    Contacted patient by phone who indicated that she had attempted to cancel the appointment this morning because she was asked to take her grandchildren to school.  Provider agreed to cancel the appointment and reminded her of next scheduled appointment 4/5/2022 @ 9:00 AM in clinic.    Patient denied any acute mental health concerns or questions and thanked this provider for the call.     Plan:   - This encounter will be marked as cancelled per patient request.    - Primary Care/Referring Provider Dr. Brasher will be alerted to this note for information only.    - Patient has a follow up therapy appointment scheduled 4/5/2022 @ 9:00 AM in clinic with Dr. Bagley.    Kiah Bagley, Psaudra.MISAEL.  they/them/theirs  Primary Care Behavioral Health Fellow

## 2022-04-18 PROCEDURE — 99207 PR NO CHARGE LOS: CPT | Performed by: STUDENT IN AN ORGANIZED HEALTH CARE EDUCATION/TRAINING PROGRAM

## 2022-06-24 ENCOUNTER — TELEPHONE (OUTPATIENT)
Dept: FAMILY MEDICINE | Facility: CLINIC | Age: 62
End: 2022-06-24

## 2022-06-24 NOTE — TELEPHONE ENCOUNTER
[facility] phone call message- patient requesting a refill:    Full Medication Name: atenolol-chlorthalidone (TENORETIC) 50-25 MG tablet    Dose: Take 1 tablet by mouth daily - Oral    Pharmacy confirmed as   [facility] #[id number] - [address] AT [address] [address]  Phone: [phone] Fax: [phone]    : Yes    Additional Comments: Early refill as patient left medication behind during vacation      OK to leave a message on voice mail? Yes    Primary language: English      needed? No    Call taken on June 24, 2022 at 10:38 AM by Ilir Balderas

## 2022-06-26 DIAGNOSIS — I10 ESSENTIAL HYPERTENSION: ICD-10-CM

## 2022-06-28 RX ORDER — ATENOLOL AND CHLORTHALIDONE TABLET 50; 25 MG/1; MG/1
1 TABLET ORAL DAILY
Qty: 90 TABLET | Refills: 1 | Status: SHIPPED | OUTPATIENT
Start: 2022-06-28 | End: 2022-11-10

## 2022-06-28 NOTE — TELEPHONE ENCOUNTER
The patient is now completely out of this prescription (pharmacy gave pt a few days worth of medication). The patient will like to know the status of this refill.

## 2022-08-29 ENCOUNTER — TELEPHONE (OUTPATIENT)
Dept: FAMILY MEDICINE | Facility: CLINIC | Age: 62
End: 2022-08-29
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
## 2022-10-15 ENCOUNTER — HEALTH MAINTENANCE LETTER (OUTPATIENT)
Age: 62
End: 2022-10-15

## 2022-11-10 DIAGNOSIS — I10 ESSENTIAL HYPERTENSION: ICD-10-CM

## 2022-11-10 RX ORDER — ATENOLOL AND CHLORTHALIDONE TABLET 50; 25 MG/1; MG/1
1 TABLET ORAL DAILY
Qty: 90 TABLET | Refills: 0 | Status: SHIPPED | OUTPATIENT
Start: 2022-11-10 | End: 2023-02-13

## 2022-11-10 NOTE — TELEPHONE ENCOUNTER
[facility] phone call message- patient requesting a refill:    Full Medication Name:   atenolol-chlorthalidone (TENORETIC) 50-25 MG tablet 90 tablet 1           Pharmacy confirmed as   [facility] #[id number] - [address] AT [address] [address]  Phone: [phone] Fax: [phone]  : Yes    Additional Comments: Patient called and said that her son has passed away recently and she is completely out of medications because she has been taking medication 2 times a day instead of 1 a day. Patient said she has been highly stressed and emotional. Patient is requesting if she can switch to 2x a day instead of 1x a day and is requesting a refill. Call back number is [phone].      OK to leave a message on voice mail? Yes    Primary language: English      needed? No    Call taken on November 10, 2022 at 10:54 AM by Margo Villasenor

## 2022-11-10 NOTE — TELEPHONE ENCOUNTER
RN called pt to relay message below from PCP. Pt stated it will probably be a refill too soon since she is out. Will ask pharmacy if she can buy over the counter    Angelika Robson Tracy RN

## 2022-11-10 NOTE — TELEPHONE ENCOUNTER
Routing to PCP to review request below that pt is taking BID. Please update and refill if appropriate    Angelika Tracy RN

## 2022-11-10 NOTE — TELEPHONE ENCOUNTER
Please call the patient back and express my condolences about the death of her son.  I refilled the medication at daily -two times daily is too much for the medication she has and may be unsafe.  She needs to come in for a visit to reconcile her medications.  Orlin Brasher

## 2023-02-09 ENCOUNTER — TELEPHONE (OUTPATIENT)
Dept: FAMILY MEDICINE | Facility: CLINIC | Age: 63
End: 2023-02-09
Payer: COMMERCIAL

## 2023-02-09 NOTE — TELEPHONE ENCOUNTER
[facility] phone call message- general phone call:    Reason for call: This patient previously saw Dr. Bagley, and is wanting to start seeing a mental health provider again. She said that she is continuing to struggle with the loss of her son and that it is it starting to impact her relationships. Sound like patient would really like to meet with Dr. Sherman. Please review the chart and f/u with patient or  is scheduling is needed.     Return call needed: Yes    OK to leave a message on voice mail? Yes    Primary language: English      needed? No    Call taken on February 9, 2023 at 11:20 AM by Lashonda Vela

## 2023-02-13 DIAGNOSIS — I10 ESSENTIAL HYPERTENSION: ICD-10-CM

## 2023-02-13 RX ORDER — ATENOLOL AND CHLORTHALIDONE TABLET 50; 25 MG/1; MG/1
1 TABLET ORAL DAILY
Qty: 90 TABLET | Refills: 0 | Status: SHIPPED | OUTPATIENT
Start: 2023-02-13 | End: 2023-05-09

## 2023-02-13 NOTE — TELEPHONE ENCOUNTER
"Last Visit with Dr. Coon 3/9/22    Request for medication refill:    ATENOLOL/CHLORTHALIDONE 50/25 TABS    Providers if patient needs an appointment and you are willing to give a one month supply please refill for one month and  send a letter/MyChart using \".SMILLIMITEDREFILL\" .smillimited and route chart to \"P SMI \" (Giving one month refill in non controlled medications is strongly recommended before denial)    If refill has been denied, meaning absolutely no refills without visit, please complete the smart phrase \".smirxrefuse\" and route it to the \"P SMI MED REFILLS\"  pool to inform the patient and the pharmacy.    Shelby Carrasco        "

## 2023-02-13 NOTE — LETTER
Zaira Ahn  [address] [facility] [address]    February 13, 2023        Dear Zaira Ahn,   I refilled your medication today for 90 days  It is time for an appointment and blood work.  Please call [phone] to schedule an appointment soon.      Sincerely    Al  (covering for Dr Brasher)

## 2023-03-01 ENCOUNTER — OFFICE VISIT (OUTPATIENT)
Dept: PSYCHOLOGY | Facility: CLINIC | Age: 63
End: 2023-03-01
Payer: COMMERCIAL

## 2023-03-01 DIAGNOSIS — Z63.4 BEREAVEMENT: ICD-10-CM

## 2023-03-01 DIAGNOSIS — F32.A DEPRESSION, UNSPECIFIED DEPRESSION TYPE: ICD-10-CM

## 2023-03-01 DIAGNOSIS — F41.9 ANXIETY DISORDER, UNSPECIFIED TYPE: Primary | ICD-10-CM

## 2023-03-01 PROCEDURE — 90834 PSYTX W PT 45 MINUTES: CPT | Performed by: PSYCHOLOGIST

## 2023-03-01 SDOH — SOCIAL STABILITY - SOCIAL INSECURITY: DISSAPEARANCE AND DEATH OF FAMILY MEMBER: Z63.4

## 2023-03-01 NOTE — LETTER
Zaira Ahn  [address] [place] [id number]      March 3, 2023      To whom it may concern,    Zaira Ahn is currently under our clinic's medical care. She is unsuitable for jury duty at this time due to chronic medical conditions.  Please excuse from jury duty until March 3, 2024    Sincerely,      Christina Shemran PsyD, LP

## 2023-03-01 NOTE — PROGRESS NOTES
Behavioral Health Progress Note    Client's Legal Name: Zaira Ahn  Client's Preferred Name: Zaira  YOB: 1960  Type of Service: in-person  Length of Service:  Start time: 1:20 PM  End time: 2 PM  Duration: 40 minutes  Attendees: patient    Identifying Information and Presenting Problem:  Zaira is a 62 year old female who is being seen for problematic symptoms of  depressed mood, grief, and increased alcohol use. She was seen by Dr. Bagley in the Spring of 2022 for  one session due to grief following her son dying by suicide.  Other stressors include her   deciding to retire after this event and she continues to work to support the two  of them. Additionally, her daughter has been upset with her for her alcohol use.    Treatment Objective(s) Addressed in This Session:  n/a -this was our first visit    Progress on / Status of Treatment Objective(s) / Homework:  N/a - first session    Mental Health Screening Questionnaires:  PHQ 3/9/2022   PHQ-9 Total Score 12   Q9: Thoughts of better off dead/self-harm past 2 weeks Not at all     ROXANN-7 SCORE 8/26/2014 3/9/2022   Total Score 9 -   Total Score - 8     Topics Discussed/Interventions Provided:  Reviewed Dr. Bagley s note and assessed Zaira s current status and reason/s for  returning to therapy at this time. Zaira reports that she has been experiencing a lot  of anxiety. Feels anxious, stressed, and worried almost every day. Reports it takes  everything out of her to go to work each day and be around people. Often will respond  with irritability and shortness when she is talking with her  when she gets home  due to the anxiety. Just wants to be home as anxiety feels somewhat better when she is  home. Thinks about her son frequently and misses him tremendously. States that they  were best friends. She has found the drinking alcohol seems to calm some of her  anxiety. Part of her would like to decrease use of alcohol, this seems to  be driven  primarily by a desire to appease her daughter who gets very upset about her alcohol  use. Part of her feels like the alcohol  helps me pretend.  She feels it helps her respond  to her  in the way  he needs  her to respond. Additional stressor is related to a  recent summons for jury duty. Doesn t feel she can do it at this time due to the level of  anxiety she is having.    Assessment:  Zaira appeared to be active and engaged in the session today and was receptive to  Feedback.    Mental Status:  During our visit today, Zaira was forthright, thoughtful, and open. She was alert and  oriented to all spheres. She was casually dressed and well groomed. Her clothing was  appropriate for the weather and occasion. Eye contact was good. Psychomotor  functioning was wnl. Speech was normal rate and rhythm. She described her mood as  anxious, affect was mood-congruent. Thought processes were logical and coherent.  Thought content had no evidence of psychotic features and no evidence of suicide,  homicide, or nonsuicidal self injury related thoughts, intent, urges, planning,  behavior/recent attempts. Memory appeared grossly intact without being formally  evaluated. Insight and judgement were good. Patient exhibited good impulse control  during the appointment.    Does the patient appear to be at imminent risk of harm to self/others at this time? No    The session was necessary to address symptoms of anxiety and concerns around  alcohol use use that have been interfering with patient's ability to function in areas  related to work, interacting and relating with others. Ongoing psychotherapy is  necessary to provide counseling and reduce problematic substance use.    DSM-5 Diagnosis (provisional):  Anxiety Disorder, unspecified  Depressive Disorder, unspecified  Bereavement  R/o Alcohol Use Disorder    Plan:  - Zaira will look into if there is a form for jury duty and let me know if I need to  complete a  form or write a letter  - Consider making an appointment with PCP or one of their team docs to assist with  medication refills and physical health check as it has been some time since her last  appointment.  - follow up on 3/10 at 9am    NOTE: Treatment plan due in two more sessions. Diagnostic assessment/update  due in two sessions.

## 2023-03-01 NOTE — LETTER
3/3/2023     RE: Zaira Ahn   : 1960        :   Zaira Ahn is a patient of mine. It is my medical opinion that she is not a candidate for jury duty at this time due to chronic medical conditions. Please excuse her.     If you have any questions, feel free to contact my office.     Sincerely,      Christina Sherman PsyD, LP

## 2023-03-01 NOTE — PATIENT INSTRUCTIONS
Look into if there is a form about jury duty - please drop it off at the clinic or fax it here.  If there is not form, please let me know and we can write a letter    Please make an appointment with your primary care physician    Follow up appointment with me on 3/10 at 9 am in person appt

## 2023-03-03 ENCOUNTER — TELEPHONE (OUTPATIENT)
Dept: FAMILY MEDICINE | Facility: CLINIC | Age: 63
End: 2023-03-03
Payer: COMMERCIAL

## 2023-03-03 NOTE — TELEPHONE ENCOUNTER
[facility] phone call message- general phone call:    Reason for call: The pt is requesting a letter to postpone jury duty. Please let pt know when the letter is ready and pt does have Mychart.    Return call needed: Yes    OK to leave a message on voice mail? Yes    Primary language: English      needed? No    Call taken on March 3, 2023 at 9:43 AM by Ricky Gastelum

## 2023-03-03 NOTE — TELEPHONE ENCOUNTER
Completed the letter and left it at the  for the patient to . Called patient to let her know it is available.

## 2023-03-10 ENCOUNTER — OFFICE VISIT (OUTPATIENT)
Dept: PSYCHOLOGY | Facility: CLINIC | Age: 63
End: 2023-03-10
Payer: COMMERCIAL

## 2023-03-10 DIAGNOSIS — F10.10 MILD ALCOHOL USE DISORDER: ICD-10-CM

## 2023-03-10 DIAGNOSIS — F41.9 ANXIETY DISORDER, UNSPECIFIED TYPE: Primary | ICD-10-CM

## 2023-03-10 DIAGNOSIS — F32.A DEPRESSION, UNSPECIFIED DEPRESSION TYPE: ICD-10-CM

## 2023-03-10 PROCEDURE — 90791 PSYCH DIAGNOSTIC EVALUATION: CPT | Performed by: PSYCHOLOGIST

## 2023-03-10 NOTE — PATIENT INSTRUCTIONS
Appts for 3/22 at 830 and 4/10 at 820 am  Alcoholics Anonymous [phone]        [facility]  [phone] - call or text    Virtual Grief Support Group  Meets monthly.  Contact: [email or web address] or [phone]    [facility] for Grief Support  meets Saturdays 9:30-11:30 a.m.  Location varies.  http://Adenyo.Meteor/    [facility] Grief Coalition  meets Thursday evenings 5:30-7:00 p.m.  Location varies.  Contact Josselin Aguero: [phone]    [facility] Grief Coalition  https://brgriefcoalition.Meteor/    [facility] for Grief Support  https://[facility] Hospital[facility]support.org/    [facility] Grief Coalition    [facility] Grief Coalition  https://www.griefcoalition.org/    Growing Through Loss - [facility]  https://www.Fitness Interactive Experience.Meteor/    [facility] Grief Support Coalition  http://www.Xtalicloss.org/    [facility]  supporting family after a child dies  https://www.compassionatefriends.org/    [facility] Loss  https://www.griefloss.org/therapy-groups.html    [facility]  https://www.brighterdaysgriefcenter.org/     [facility]  https://www.[facility].org/      Bereavement, Grief, and Mourning    Bereavement is the state of having lost a significant other to death. Grief is the personal response to the loss. Mourning is the public expression of that loss.    What Is  Normal  Grief?  Grief reactions vary depending on who we are, who we lost, our relationship with that person, the circumstances around their passing, and how much their loss affects our day-to-day functioning. Different people may express grief differently; you may even have different grief responses between one loss and another. Reactions to grief and loss include not just emotional symptoms but also behavioral and physical symptoms. These reactions often change over time. All are normal  for a short period. The following are some of the symptoms you may experience:    Emotional. Shock, denial, numbness, sadness, anxiety, guilt, fear, anger, irritability.  Behavioral. Crying unexpectedly, sleep changes, not eating, withdrawing from others, restlessness, trouble making decisions.  Physical. Concentration problems, exhaustion or fatigue, decreased energy, memory problems, upset stomach, pain, and headaches. Symptoms that are not normal and may signal the need to talk to a professional include, use of drugs or alcohol, violence, and thoughts of killing oneself.    Duration  The duration of grief varies from person to person. Research shows that the average recovery time is 18 to 24 months. Grief reactions can be stronger around significant dates, like the anniversary of the person s death, birthdays, and holidays.    Giving Yourself Time to Grieve  It is normal and important to express your grief and to work through the concerns that arise for you at this time. Avoiding your feelings may not be helpful and may delay or prolong your grief. The following are some suggestions that may help you:    Find supportive people to reach out to during your grief. This is the time when the support of others may be the most helpful. Don t be afraid to tell them how they can best help, even if it means just listening. It is often very helpful to talk about your loss with people who will allow you to express your emotions.  Take care of your health. Often after a loss, we stop doing the things we need to for health care, such as exercising, eating correctly, or taking prescribed medications. If you are on a health care regimen, it is important to continue to follow that plan.  Postpone major life changes. Give yourself time to adjust to your loss before making plans to change jobs, move or sell your home, remarry, and so forth. Grief can sometimes cloud your judgment and ability to make decisions.  Consider keeping a  journal. It is often helpful, as a way to work through your feelings, to write or tell the story of your loss and what it means to you.   Participate in activities. Staying active through exercise, enjoyable activities, outings with supportive others, or starting new hobbies can help you get through tough times while providing opportunities for constructive development and use of energy.  Find a way to memorialize your loved one. Planting a tree or garden in the name of your loved one, dedicating a work to their memory, contributing to a bobo in their name, and other such activities can be helpful.  Consider joining a grief-support group or contacting a grief counselor for additional support and help. Remember that depressive symptoms (e.g., feeling sad) are a fundamental part of normal bereavement. Staying active and finding support from others can help you to work through the grief process.    MACARIO Sanon., LIBRADO Sebastian., Dao, M. S., & BASILIO France. (2009). Integrated Behavioral Jam in Primary Care: Step-by-Step Guidance for Assessment and Intervention. American Psychological Association.     How Grief Changes Over Time (3 minute video)  Bottom Line:  Grief may not go away over time, but we can learn how to cushion it with other positive life experiences so it causes us less pain.  https://www.youOccipital.com/watch?v=uagoXuRtt0P

## 2023-03-10 NOTE — PROGRESS NOTES
Behavioral Health Diagnostic Assessment:    Client's Legal Name: Zaira Ahn    Client's Preferred Name: Zaira  YOB: 1960  Type of Service: in-person  Length of Service:   Start time: 906    End time: 9:50   Duration: 44 minutes  Attendees: patient    Assessment Summary:  Diagnostic completed today. Zaira is a 62 year old female who was referred for mental health services for help with grief, depression, anxiety, and alcohol use. Based on Zaira's report of symptoms, she meets criteria for anxiety disorder, unspecified and unspecified depression. Zaira's mental health concerns have been affecting her ability to function in areas related to interacting and relating with others, family relationships, intimate partner relationships and day to day self care or health behaviors causing clinically significant distress. Patient also reports drinking about 1 pint/vodka each day.  She has some ambivalence about whether or not she would like to quit completely, but does feel like this is something she would like to focus on more in therapy. She denies any suicidal or homicidal ideation, plan or intent.  Based on Zaira's reported symptoms and impact on functioning, the plan is begin individual therapy. She will continue to follow with PCP for medications.  Will offer grief therapy resources.    DSM-5-TR Diagnoses:  Unspecified Anxiety  Unspecified Depression  Alcohol Use Disorder    Orientation, Recommendations, & Plan:     Rights to and limits to confidentiality were discussed with patient.    Integrated care team and shared chart were discussed with patient and agreed upon.     Follow-up with this provider in two weeks    After Visit Summary will be reviewed in [facility]    Mental Health Screening Questionnaires:  PHQ-9:   PHQ 3/9/2022   PHQ-9 Total Score 12   Q9: Thoughts of better off dead/self-harm past 2 weeks Not at all      ROXANN-7:   ROXANN-7 SCORE 8/26/2014 3/9/2022   Total Score 9 -   Total  Score - 8     CAGE-AID: No flowsheet data found.    Current Presenting Problems or Complaints (including patient perception of problem and external factors contributing to current dilemma):   Zaira reports that she has been experiencing a lot of anxiety. Feels anxious, stressed, and worried almost every day. Reports it takes everything out of her to go to work each day and be around people. Often will respond with irritability and shortness when she is talking with her  when she gets home due to the anxiety. Just wants to be home as anxiety feels somewhat better when she is home.    Zaira she feels tired and sad all the time.  Feels overwhelmed with her sadness.  Wakes up feeling that way.  Every once in awhile there will be a day where she feels a little more up for a couple of hours, but it doesn't last.  States she has no appetite - she will drink a cup of boullion in the afternoon and have a little big for dinner.  Can fall asleep, but she attributes this to the alcohol.  Will wake around 2 or 2:30 and then can't get back to sleep until 4:30 or 5.  Usually up for the day by 7:45.      Grief - feeling very alone since her son , also feels guilty at the same time that she is not there for her  and daughter like she would like to be.    Zaira has some concerns about her alcohol use.  Is more concerned with her daughter being upset with her for her alcohol use.  Part of her would like to decrease alcohol use, but it also offers her some respite from the pain and sadness, as well as the anxiety that she feels. She states the alcohol  helps me pretend.  She feels it helps her respond to her  in the way  he needs  her to respond.    Was started on Zoloft last week.  Thinks it might be helping.    Review of Symptoms:    Depression: as above  Melonie: none  Psychosis: none  Anxiety: as above  Trauma Symptoms: loss of son to suicide has been very hard.    Functioning:  Mental health symptoms  "negatively impaction functioning in areas related to interacting and relating with others, family relationships, intimate partner relationships, day to day self care or health behaviors and participating in meaningful activities    Patient Strengths and Resources:  Articulate, intelligent, committed to her family, enjoys her work    Pertinent Social Determinants of Health:   Zaira is impacted by the following social determinants of health: none reported    Chemical Health History:  Alcohol Use: alcohol use is about the same - 1 pint every day of vodka, if I drink more feel sick or unwell.  She will start the day saying \"I'm going to be strong and not drink today\" but as the day goes on she will start to drink.  Doesn't drink at work, drinks at home.  Job is at a liquor store.  Will sometimes buy her alcohol from the store where she works, but will go to other stores if she feels like she has been buying too much at the store that week.  Reports she has fallen from alcohol use.  Drug Use: none  Prescription Misuse: none  Tobacco Use: none  Caffeine Use: didn't ask    Patient past attempts to control alcohol/drug use (including informal approaches or formal treatment): has tried to stop on her own, has not completed a treatment program.  Has gone to adult children of alcoholics and participated in some programming with her dad when he was in treatment.  Have there been any consequences related to clients drug use? Social, physical    Previous Mental Health Concerns/Treatment:  Outpatient: was seen here for one visit last year.  Family therapy with her dad  Inpatient: None  Residential: None    Suicide Assessment:  Recent suicidal thoughts: No  Past suicidal thoughts: No  Any attempts in the past: No  Any family/friends/loved ones die by suicide: Yes: Son  by suicide two years ago  Plan or considering various methods: No  Protective factors: no h/o suicide attempt, no plan or intent, commitment to family, good " social support  , stable housing and good job situation  Verbal contract for safety: N/A    Non-Suicidal Self Injurious Behavior:   No    Violence/Homicide Risk Assessment:  Problems with anger management: No  History of violence: No  History of significant damage to property: No  Threat made to harm or kill someone: No  Verbal contract for safety: N/A    Mental Status Exam:  During interaction with the provider today, Zaira was cooperative, open, engaged and pleasant. Patient was generally alert and oriented to person, place, time, and situation. They were casually dressed, appropriately groomed and appeared stated age. Patient's attire was appropriate for the weather and occasion. Eye contact good. Psychomotor functioning: restless. Speech was normal limits tone, rate, volume; largely coherent and relevant to topic. Mood was sad, grieving and anxious; affect appropriate and mood-congruent. Thought processes: logical, coherent and goal oriented. Thought content: no evidence of psychotic features and no evidence of suicide, homicide, or nonsuicidal self-injury related concerns. Memory appeared grossly intact without being formally evaluated. Insight: adequate. Judgment good. Patient exhibited good impulse control during the appointment.       Social History and Associated Level of Functioning (See below):    Current Living Arrangements: Lives with  in their own house    Family/Children: son - , daughter - 40 years old, two grandaughter 13 and 11, live five blocks away,   Three siblings, older brother 66, older sister 64, younger brother 60 - very separate  Parents are  - dad 20 years ago, mom 5 years ago, both alcoholics, mom also took oxycodone pills,   Dad left when I was 3 came back when she was 13, in and out of life.     Intimate Relationship/Marriage: 43 years , overall supportive, feels good about their relationship.  Feels like they are in separate places right now - don't  spend a lot of time together because he is retired and she is still working.     Social Connection: no friends/other people to spend time with, very solitary person    Developmental History: met milestones on time as best she knows    Abuse/Trauma: none reported    Work: works as a  at liquor store    Education: high school diploma    Legal: none     Personal Health: haven't been in to see PCP in over a year.  Needs to get a physical, has a lot of aches and pains, bloodwork redone, was on dm II meds, had lost a lot of weight so stopped meds    Patient Active Problem List   Diagnosis     Depression     Hypertension     Injury of left heel     Cervical cancer screening     PMB (postmenopausal bleeding)     Overweight     Type 2 diabetes mellitus without complication (H)     Metabolic syndrome     Diabetic mononeuropathy associated with type 2 diabetes mellitus (H)     Current Outpatient Medications   Medication     atenolol-chlorthalidone (TENORETIC) 50-25 MG tablet     atorvastatin (LIPITOR) 40 MG tablet     metFORMIN (GLUCOPHAGE-XR) 500 MG 24 hr tablet     nicotine (NICODERM CQ) 14 MG/24HR 24 hr patch     sertraline (ZOLOFT) 25 MG tablet     No current facility-administered medications for this visit.     Family Health History: Both parents had problems with alcohol    NOTE: Diagnostic complete.     Christina Sherman PsyD

## 2023-03-21 NOTE — PROGRESS NOTES
Behavioral Health Progress Note    Client's Legal Name: Zaira Ahn  Client's Preferred Name: Zaira  YOB: 1960  Type of Service: in-person  Length of Service:  Start time: 8:30 AM  End time: 9:20 AM  Duration: 50 minutes  Attendees: patient    Identifying Information and Presenting Problem:  Zaira is a 62 year old female who is being seen for problematic symptoms of  depressed mood, grief, and increased alcohol use. She was seen by Dr. Bagley in the Spring of 2022 for one session due to grief following her son dying by suicide.  Other stressors include her  deciding to retire after this event and she continues to work to support the two of them. Additionally, her daughter has been upset with her for her alcohol use.    Treatment Objective(s) Addressed in This Session:  Focus is on anxiety and alcohol use at this time    Progress on / Status of Treatment Objective(s) / Homework:  Followed through on items discussed at last visit    Mental Health Screening Questionnaires:  PHQ 3/9/2022   PHQ-9 Total Score 12   Q9: Thoughts of better off dead/self-harm past 2 weeks Not at all     ROXANN-7 SCORE 8/26/2014 3/9/2022   Total Score 9 -   Total Score - 8     Topics Discussed/Interventions Provided:  Alcohol Withdrawal: Zaira decided to stop drinking on Friday night.  She was feeling really sick and miserable on Saturday and Sunday.  She was feeling so sick on Sunday, that she decided to drink some alcohol - had about 1/3 of one pint.  Has continued to have a few drinks/day since Sunday to try to reduce the withdrawal symptoms.  Was vomiting, shaking, cold/shivering, sweating, super tired, feeling weak, nauseous.  Missed two days of work due to these symptoms.  Trying to drink some water - would vomit it up at times.  Also drinking some gatorade.  Hasn't reported any changes in urination.  We had talked about detox before and discussed it today. Is not interested in going to detox.  Shared that  "there are risks to alcohol withdrawal and want to make sure she is safe.  She was agreeable to me talking to Dr. Brasher.  I did go into the clinic to see if he was here today, but he was not.  She agreed to me sending him a message to let him know where she is at with the withdrawal and to see if there are any meds that could be offered to help with symptoms. She did not want me to talk to to the preceptor here today about this possibility, just Dr. Brasher.      Did not share with her  or her daughter that she stopped drinking and this was why she was feeling sick.    Was fearful that if we did talk to any outside people that she would be \"arrested\"  With further discussion this was a fear about being hospitalized.  Discussed the circumstances under which we would consider inpatient hospitalization, this did not appear to be one given the information she provided today.  Reiterated that detox would be the best option, but that we would not have any reason to commit her to detox.  This conversation is what opened her up to me being able to talk to her PCP more about this.    Discussed substance use treatment options with Zaira - including the different levels of care. Offered to call intake to make an appointment for an assessment at [place]. Zaira agreed to have me put in the order and for them to call her to set this up.      Zaira did look into the different grief groups and reached out to get more information on one that is held downtown.  She is planning to go to this group on Saturday.      Assessment:  During our visit today, Zaira was open, thoughtful and actively engaged in the session.  She appeared very fatigued, drawn, and unwell. It was clear she was not feeling well.  Zaira was alert and oriented to all spheres. She was casually dressed and well groomed. Her clothing was appropriate for the weather and occasion. Eye contact was good.  She was seated comfortably on the bench in the exam " "room and I did not observe any sweating, shivers, trembling, when she was in the office.  Speech was normal rate and rhythm. She described her mood as \"not so good\", hasn't necessarily noticed any increased anxiety because she has been feeling so poorly.  Thought processes were logical and coherent. Thought content had no evidence of psychotic features and no evidence of suicide, homicide, or nonsuicidal self injury related thoughts, intent, urges, planning, behavior/recent attempts. Memory appeared grossly intact without being formally evaluated. Insight and judgement were fair - open to possibly getting help, but ambivalent due to thoughts that she should be strong enough to do this on her own, as well as thoughts that maybe this isn't that bad. Patient exhibited good impulse control during the appointment.    Does the patient appear to be at imminent risk of harm to self/others at this time? No    The session was necessary to discuss risks of withdrawal and at the same time provide positive reinforcement for the steps that Zaira has taken in the last week to address her alcohol use.  Alcohol use and anxiety have been interfering with patient's ability to function in areas related to work, interacting and relating with others. Ongoing psychotherapy is necessary to provide counseling and reduce problematic substance use.    DSM-5 Diagnosis (provisional):  Alcohol Use Disorder, moderate dependence  Unspecified Anxiety  Unspecified Depression      Plan:  - Patient declining detox  - I will reach out to Dr. Brasher and see if he can call patient to talk to her further about withdrawal symptoms, including concerns about risk, as well as if any medications are safe to address some of her symptoms on an outpatient basis  - put in referral for substance use evaluation at [place]  - attending grief support group on Saturday  - Discussed that I am out of the office next week, but she can mychart or call the clinic if " needed and either Dr. Tarango or a nurse will respond.      NOTE: Need to update treatment plan

## 2023-03-22 ENCOUNTER — OFFICE VISIT (OUTPATIENT)
Dept: PSYCHOLOGY | Facility: CLINIC | Age: 63
End: 2023-03-22
Payer: COMMERCIAL

## 2023-03-22 ENCOUNTER — TELEPHONE (OUTPATIENT)
Dept: FAMILY MEDICINE | Facility: CLINIC | Age: 63
End: 2023-03-22
Payer: COMMERCIAL

## 2023-03-22 DIAGNOSIS — F32.A DEPRESSION, UNSPECIFIED DEPRESSION TYPE: ICD-10-CM

## 2023-03-22 DIAGNOSIS — F41.9 ANXIETY DISORDER, UNSPECIFIED TYPE: ICD-10-CM

## 2023-03-22 DIAGNOSIS — F10.20 ALCOHOL USE DISORDER, MODERATE, DEPENDENCE (H): Primary | ICD-10-CM

## 2023-03-22 PROCEDURE — 90834 PSYTX W PT 45 MINUTES: CPT | Performed by: PSYCHOLOGIST

## 2023-03-22 NOTE — Clinical Note
Nicolas Meneses quit drinking on Friday night and has been having some significant withdrawal symptoms. Seem to be decreasing now, but still pretty uncomfortable.  Please see my note for more info.  Could you reach out to her to check in around safety with the withdrawal symptoms and to see if there is any meds that might be helpful. I talked to her about detox but she was not interested in going.  Let me know if you have any questions. Thank you, Christina

## 2023-03-24 ENCOUNTER — TELEPHONE (OUTPATIENT)
Dept: FAMILY MEDICINE | Facility: CLINIC | Age: 63
End: 2023-03-24
Payer: COMMERCIAL

## 2023-03-24 NOTE — TELEPHONE ENCOUNTER
3/24/2023, 8:32 AM    Called patient to offer support.  No answer left message and asked her to call back if she needs help.  Orlin Brasher

## 2023-03-24 NOTE — TELEPHONE ENCOUNTER
Called patient back again today.  She answered. Her question was about if I had been able to connect with Dr. Brasher but she saw that he had reached out by [employer] so her question was answered. Reports that she had one ore day of really not feeling well, but feeling much better now.  Drinking water, no vomiting. Sounded much better on the phone.  Encouraged her to call Dr. Brasher back or to respond to his [employer] message.  She said she would do so.

## 2023-04-20 ENCOUNTER — TELEPHONE (OUTPATIENT)
Dept: PSYCHOLOGY | Facility: CLINIC | Age: 63
End: 2023-04-20

## 2023-04-20 NOTE — TELEPHONE ENCOUNTER
Called patient to follow up on missed appointment last week. No answer. Left a message asking that she contact me and/or make a follow up appointment.

## 2023-04-26 ENCOUNTER — TELEPHONE (OUTPATIENT)
Dept: FAMILY MEDICINE | Facility: CLINIC | Age: 63
End: 2023-04-26

## 2023-04-26 NOTE — TELEPHONE ENCOUNTER
Reason for call: Schedule appointment     Attempt to reach: 1st    Outcome:Left voicemail    Detailed message left? Yes     LVM to schedule physical and [facility]    Please return call to [facility]     Clinic phone number [phone]

## 2023-05-09 DIAGNOSIS — I10 ESSENTIAL HYPERTENSION: ICD-10-CM

## 2023-05-09 RX ORDER — ATENOLOL AND CHLORTHALIDONE TABLET 50; 25 MG/1; MG/1
1 TABLET ORAL DAILY
Qty: 90 TABLET | Refills: 0 | Status: SHIPPED | OUTPATIENT
Start: 2023-05-09 | End: 2023-07-24

## 2023-05-09 NOTE — TELEPHONE ENCOUNTER
"Last seen 4/13/23    Request for medication refill:  atenolol-chlorthalidone (TENORETIC) 50-25 MG tablet  Providers if patient needs an appointment and you are willing to give a one month supply please refill for one month and  send a letter/MyChart using \".SMILLIMITEDREFILL\" .smillimited and route chart to \"P SMI \" (Giving one month refill in non controlled medications is strongly recommended before denial)    If refill has been denied, meaning absolutely no refills without visit, please complete the smart phrase \".smirxrefuse\" and route it to the \"P SMI MED REFILLS\"  pool to inform the patient and the pharmacy.    Aleena Sylvester RN      "

## 2023-05-09 NOTE — TELEPHONE ENCOUNTER
[facility] phone call message- patient requesting a refill:    Full Medication Name: atenolol-chlorthalidone (TENORETIC) 50-25 MG tablet        Pharmacy confirmed as     [id number] - [address] AT [address] [address]  Phone: [phone] Fax: [phone]    : Yes    Additional Comments: The patient is requesting a refill.     OK to leave a message on voice mail? Yes    Primary language: English      needed? No    Call taken on May 9, 2023 at 9:14 AM by Lexi Underwood

## 2023-05-27 ENCOUNTER — HEALTH MAINTENANCE LETTER (OUTPATIENT)
Age: 63
End: 2023-05-27

## 2023-06-19 ENCOUNTER — OFFICE VISIT (OUTPATIENT)
Dept: PSYCHOLOGY | Facility: CLINIC | Age: 63
End: 2023-06-19
Payer: COMMERCIAL

## 2023-06-19 DIAGNOSIS — F10.20 ALCOHOL USE DISORDER, MODERATE, DEPENDENCE (H): Primary | ICD-10-CM

## 2023-06-19 PROCEDURE — 90832 PSYTX W PT 30 MINUTES: CPT | Performed by: PSYCHOLOGIST

## 2023-06-19 NOTE — PROGRESS NOTES
Behavioral Health Progress Note    Client's Legal Name: Zaira Ahn  Client's Preferred Name: Zaira  YOB: 1960  Type of Service: in-person  Length of Service:  Start time: 8:20 AM  End time: 8:55 AM  Duration: 35 minutes  Attendees: patient and her daughter, Kailey    Identifying Information and Presenting Problem:  Zaira is a 62 year old female who is being seen for problematic symptoms of depressed mood, grief, and increased alcohol use. She was seen by Dr. Bagley in the Spring of 2022 for one session due to grief following her son dying by suicide.  Other stressors include her  deciding to retire after this event and she continues to work to support the two of them. Additionally, her daughter has been upset about Zaira's alcohol use.    Treatment Objective(s) Addressed in This Session:  Reducing alcohol use and anxiety    Progress on / Status of Treatment Objective(s) / Homework:  Zaira reports decreased alcohol use    Mental Health Screening Questionnaires:      3/9/2022    12:14 PM   PHQ   PHQ-9 Total Score 12   Q9: Thoughts of better off dead/self-harm past 2 weeks Not at all         8/26/2014     5:30 PM 3/9/2022    12:15 PM   ROXANN-7 SCORE   Total Score 9    Total Score  8     Topics Discussed/Interventions Provided:  Zaira reports overall decreased alcohol use, but continues to drink alcohol periodically. Cites stress at work as a contributor to alcohol use. Daughter came with Zaira to the appointment today.  Primary concern per daughter is that Hafsas alcohol use has worsened/improved frequently in the past.  Concerned that this last time was even worse than usual and that the pattern will continue for her mom if she does not engage in more structured treatment.  Daughter was particularly concerned about Zaira's use this Spring as Zaira is always present for her granddaughter's events and there were two events she missed for them due to intoxication.   "    Zaira reports readiness and willingness to engage in a treatment program at this time. We had discussed this previously, but she declined at that time, stating she wanted to do this on her own.  We reviewed the different levels of care available, as well as the process to connect with care through [facility].  Zaira is interested in an IOP.      Grief group - attended one time. Felt anxious that if she talked, she would cry. Also was anxious that someone might hug her if she cries and she would feel uncomfortable with that.  Normalized that the first group can be very uncomfortable and awkward. Encouraged her to attend again or to look into another grief group that she could try. Discussed contacting the facilitator to share her worries/concerns with them to see if they had any suggestions to share with her about those worries.       Assessment:  During the visit today, Zaira was open and actively engaged in the session.  She was seated comfortably on the bench next to her daughter. Demonstrably affectionate toward her daughter.  She appeared alert and oriented to all spheres.  Zaira was well-dressed and well groomed.  Eye contact was good.  Speech was normal rate and rhythm. She described her mood as \"better\", continues to have anxiety, but reports feeling more acceptance around her son's death.  Thought processes were logical and coherent. Thought content had no evidence of psychotic features and no evidence of suicide, homicide, or nonsuicidal self injury related thoughts, intent, urges, planning, behavior/recent attempts. Memory appeared grossly intact without being formally evaluated. Insight and judgement are adequate, some ambivalence about engaging. Patient exhibited good impulse control during the appointment.    Does the patient appear to be at imminent risk of harm to self/others at this time? No    The session was necessary to discuss current status of alcohol use, as well as concerns " from daughter regarding alcohol use. Discussed options for treatment.  Alcohol use and anxiety have been interfering with patient's ability to function in areas related to work, interacting and relating with others. Ongoing psychotherapy is necessary to provide counseling and reduce problematic substance use.    DSM-5 Diagnosis (provisional):  Alcohol Use Disorder, moderate dependence  Unspecified Anxiety  Unspecified Depression      Plan:  - Placed order for substance use evaluation today.  Discussed both substance only treatment, as well as co-occurring disorders program. She is interested in an IOP program that could fit with her work schedule. Also put the phone number for central intake in her AVS so she could try to call them as well to schedule the intake.    - Encouraged her to return to grief group for another meeting or try to identify another group to attend their meeting to see if that feels like more of a fit    - Offered to schedule a f/up appt with this provider, she would like to try to get evaluation set up and then see about scheduling another appointment.     - Recommended that she make an appt to see her PCP        NOTE: Need to update treatment plan

## 2023-07-07 ENCOUNTER — TELEPHONE (OUTPATIENT)
Dept: BEHAVIORAL HEALTH | Facility: CLINIC | Age: 63
End: 2023-07-07
Payer: COMMERCIAL

## 2023-07-21 ENCOUNTER — TELEPHONE (OUTPATIENT)
Dept: BEHAVIORAL HEALTH | Facility: CLINIC | Age: 63
End: 2023-07-21
Payer: COMMERCIAL

## 2023-07-24 ENCOUNTER — MYC REFILL (OUTPATIENT)
Dept: FAMILY MEDICINE | Facility: CLINIC | Age: 63
End: 2023-07-24
Payer: COMMERCIAL

## 2023-07-24 DIAGNOSIS — I10 ESSENTIAL HYPERTENSION: ICD-10-CM

## 2023-07-26 ENCOUNTER — TELEPHONE (OUTPATIENT)
Dept: BEHAVIORAL HEALTH | Facility: CLINIC | Age: 63
End: 2023-07-26

## 2023-07-26 ENCOUNTER — HOSPITAL ENCOUNTER (OUTPATIENT)
Dept: BEHAVIORAL HEALTH | Facility: CLINIC | Age: 63
Discharge: HOME | End: 2023-07-26
Attending: FAMILY MEDICINE | Admitting: FAMILY MEDICINE
Payer: COMMERCIAL

## 2023-07-26 PROCEDURE — H0001 ALCOHOL AND/OR DRUG ASSESS: HCPCS | Performed by: COUNSELOR

## 2023-07-26 RX ORDER — ATENOLOL AND CHLORTHALIDONE TABLET 50; 25 MG/1; MG/1
1 TABLET ORAL DAILY
Qty: 30 TABLET | Refills: 0 | Status: SHIPPED | OUTPATIENT
Start: 2023-07-26 | End: 2023-09-25

## 2023-07-26 ASSESSMENT — ANXIETY QUESTIONNAIRES
6. BECOMING EASILY ANNOYED OR IRRITABLE: NEARLY EVERY DAY
7. FEELING AFRAID AS IF SOMETHING AWFUL MIGHT HAPPEN: MORE THAN HALF THE DAYS
1. FEELING NERVOUS, ANXIOUS, OR ON EDGE: NEARLY EVERY DAY
5. BEING SO RESTLESS THAT IT IS HARD TO SIT STILL: SEVERAL DAYS
GAD7 TOTAL SCORE: 18
GAD7 TOTAL SCORE: 18
2. NOT BEING ABLE TO STOP OR CONTROL WORRYING: NEARLY EVERY DAY
3. WORRYING TOO MUCH ABOUT DIFFERENT THINGS: NEARLY EVERY DAY

## 2023-07-26 ASSESSMENT — COLUMBIA-SUICIDE SEVERITY RATING SCALE - C-SSRS
ATTEMPT LIFETIME: NO
2. HAVE YOU ACTUALLY HAD ANY THOUGHTS OF KILLING YOURSELF?: NO
6. HAVE YOU EVER DONE ANYTHING, STARTED TO DO ANYTHING, OR PREPARED TO DO ANYTHING TO END YOUR LIFE?: NO
TOTAL  NUMBER OF INTERRUPTED ATTEMPTS LIFETIME: NO
TOTAL  NUMBER OF ABORTED OR SELF INTERRUPTED ATTEMPTS LIFETIME: NO
1. HAVE YOU WISHED YOU WERE DEAD OR WISHED YOU COULD GO TO SLEEP AND NOT WAKE UP?: NO

## 2023-07-26 ASSESSMENT — PATIENT HEALTH QUESTIONNAIRE - PHQ9
SUM OF ALL RESPONSES TO PHQ QUESTIONS 1-9: 20
5. POOR APPETITE OR OVEREATING: NEARLY EVERY DAY

## 2023-07-26 NOTE — PATIENT INSTRUCTIONS
Zaira,  It was a pleasure meeting with you today. I will place the referral to Charu Walker, admission coordinator for our intensive outpatient treatment program who will follow up with you in the next couple days. If you do not hear from her do not hesitate to call her or follow up with me and let me know and I will reach out to her. Her contact information is below along with mine and resources.    [facility]   Admission Coordinator: Charu Walker  Telephone:[phone]  E-mail:[email or web address]    [facility]: virtual M,T,W 9am-12:00pm  [facility] Afternoon: M,T,TH 1:00pm-4:00pm   [facility]:  M,T,W 12:00pm-2:00pm       RESOURCES  [facility] (free short term therapy)  Website: [email or web address]      [facility]   Website: [email or web address]  Telephone: [phone]  E-mail:[email or web address]    Sobeida Murrell, [facility], LADC Licensed Mental Health Professional [employer] Services Psychotherapist   Triage and Transition: [facility]  [facility] [address]  [email or web address]   Office: [phone] Fax: [phone]   Gender Pronouns: she/her

## 2023-07-26 NOTE — TELEPHONE ENCOUNTER
"Last seen 3/9/22    Request for medication refill:  atenolol-chlorthalidone (TENORETIC) 50-25 MG tablet     Providers if patient needs an appointment and you are willing to give a one month supply please refill for one month and  send a letter/MyChart using \".SMILLIMITEDREFILL\" .smillimited and route chart to \"P SMI \" (Giving one month refill in non controlled medications is strongly recommended before denial)    If refill has been denied, meaning absolutely no refills without visit, please complete the smart phrase \".smirxrefuse\" and route it to the \"P SMI MED REFILLS\"  pool to inform the patient and the pharmacy.    Jazmín Best RN      "

## 2023-07-26 NOTE — PROGRESS NOTES
"      [facility]    UNIVERSAL ADULT Substance Use Disorder DIAGNOSTIC ASSESSMENT    PATIENT'S NAME: Zaira Ahn  PREFERRED NAME: Zaira  PRONOUNS:       MRN: [id number]  : 1960  ADDRESS: [address] [facility] [address]  ACCT. NUMBER:  [id number]  DATE OF SERVICE: 23  START TIME: 2:00pm  END TIME: 2:58pm  PREFERRED PHONE: [phone]  May we leave a program related message: Yes  SERVICE MODALITY:  In-person    Identifying Information:  Patient is a 62 year old,    individual.  Patient was referred for an assessment by current behavioral health provider Christina Oreilly PsyD through [facility] and pcp Wei Brasher MD.  Patient attended the session with daughter.    Chief Complaint:   The reason for seeking services at this time is: \"cause I need help\". Patient reports she had been drinking too much, she has cut way back, but sometimes when it hits her she will get sick. Patient reports she gets exteremly nauseous and she will be cold but sweating and she will get the shakes.  The problem(s) began when son passed away. Patient reports her son was her best friend and they were a lot of alike.  Patient has attempted to resolve these concerns in the past through therapy and medication.  Patient does not appear to be in severe withdrawal, an imminent safety risk to self or others, or requiring immediate medical attention and may proceed with the assessment interview.    Social/Family History:  Patient reported they grew up in [place], [place].  They were raised by both parents.  Patient reported that their childhood was dysfunctional. Patient reports mom would enroll them late in school so one year would be at [employer] and next year would be public school and they missed a lot of school with the family dysfunction.  Per EHR progress note on 3/10/23 completed by Dr. Lane PsyD: \"Family/Children: son - " ", daughter - 40 years old, two grandaughter 13 and 11, live five blocks away, Three siblings, older brother 66, older sister 64, younger brother 60 - very separate. Parents are  - dad 20 years ago, mom 5 years ago, both alcoholics, mom also took oxycodone pills, Dad left when I was 3 came back when she was 13, in and out of life\".  Patient describes current relationships with family of origin as good.     The patient describes their cultural background as grew up [employer] but they did not really attend [employer].  Cultural influences and impact on patient's life structure, values, norms, and healthcare: none identified.  Contextual influences on patient's health include: occupational stress, grief, family dynamics. Work is very stressful as  and do all the order environment. 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 Patient reports  is in different grief process then she is. He wants to see his son phsycially and ask her about any signs and she just wants to be asked about her day. He worked postal hours and so he is in different routine as well even in long term.  Patient identified their preferred language to be English. Patient reported they do not need the assistance of an  or other support involved in therapy.  Patient reports they are not involved in community of sera activities.  They reports spirituality impacts recovery in the following ways:  none.     Patient reported had no significant delays in developmental tasks.   Patient's highest education level was high school graduate. Patient identified the following learning problems: none reported.  Patient reports they are  able to understand written materials.    Patient reported the following relationship history as .  Patient's current relationship status is  for 43 years.   Patient identified their sexual orientation as heterosexual.  Patient reported having two " "child(jason). One  son and an adult daughter. Patient has two grandchildren that live close.     Patient's current living/housing situation involves staying in own home/apartment. They live with  and they report that housing is stable. Patient identified daughter and  and boss at work as part of their support system.  Patient identified the quality of these relationships as good with  and with her daughter had a sy past due to her use.  Patient reports she does not have a lot of friends but she is okay with that as she is more introverted. Per EHR progress note on 3/10/23 completed by Dr. Lane PsyD: \"EHR 43 years , overall supportive, feels good about their relationship.  Feels like they are in separate places right now - don't spend a lot of time together because he is retired and she is still working\".     Patient reports engaging in the following recreational/leisure activities: reading. Patient reports engaging in the following recreation/leisure activities while using: none. Patient reports the following people are supportive of recovery: family.  Patient is currently employed full time and reports they are able to function appropriately at work. Per EHR progress note on 3/10/23 completed by Dr. Lane PsyD:\"works as a  at liquor store\". Patient reports their income is obtained through employment.  Patient does not identify finances as a current stressor.  Cultural and socioeconomic factors do affect the patient's access to services.      Patient denies substance related arrests or legal issues.  Patient denies being on probation / parole / under the jurisdiction of the court.    Patient's Strengths and Limitations:  Patient identified the following strengths or resources that will help them succeed in treatment: family support, motivation, and work ethic. Things that may interfere with the patient's success in treatment include: few friends. "     Personal and Family Medical History:  Patient did report a family history of mental health concerns.  Patient reports family history includes Alcoholism in her brother, father, and mother; Asthma in her son; C.A.D. in her brother and father; Hypertension in her maternal grandmother..      Patient reported the following previous mental health diagnoses: Unspecified Anxiety, Unspecified Depression, Alcohol Use Disorder, moderate dependence and these do impact her ability to function.   Patient has received mental health services in the past: medication and family therapy with her dad in the past, and therapy with pcp [facility].  Psychiatric Hospitalizations: None.  Patient denies a history of civil commitment.  Current mental health services/providers include:therapy, patient reports she can go see Christina Oreilly PsyD but she wanted the patient to do programming first and then follow up with her afterwards. Patient reports still working with primary care but will probably switch to new provider as her pcp has a past with her son.    Patient has had a physical exam to rule out medical causes for current symptoms.  Date of last physical exam was within the past year. Client was encouraged to follow up with PCP if symptoms were to develop. The patient has a [facility] Care Provider, who is named Wei Brasher. Patient reports the following current medical concerns: see below.  Patient denies any issues with pain.. Patient denies pregnancy..  Patient does not report a history of head injury / trauma / cognitive impairment.      Patient reports current meds as:   Current Outpatient Medications   Medication Sig    atenolol-chlorthalidone (TENORETIC) 50-25 MG tablet Take 1 tablet by mouth daily Please follow-up before next refill    atorvastatin (LIPITOR) 40 MG tablet Take 1 tablet (40 mg) by mouth daily    metFORMIN (GLUCOPHAGE-XR) 500 MG 24 hr tablet Take 2 tablets (1,000 mg) by mouth daily (with dinner)     "nicotine (NICODERM CQ) 14 MG/24HR 24 hr patch Place 1 patch onto the skin every 24 hours    sertraline (ZOLOFT) 25 MG tablet Take 1 tablet (25 mg) by mouth daily     No current facility-administered medications for this encounter.     Patient reports currently only taking the atenolol.    Medication Adherence:  Patient reports taking prescribed medications as prescribed.  Patient is able to self-administer medications.      Patient Allergies:    Allergies   Allergen Reactions    Codeine Other (See Comments), Rash and Nausea and Vomiting     Itchy      Tylenol W/Codeine [Acetaminophen-Codeine]        Medical History:    Past Medical History:   Diagnosis Date    Arthritis     Diabetes (H)     Hypertension        Substance Age of first use Pattern and duration of use (include amounts and frequency) Date of last use     Withdrawal potential Route of administration   has used Alcohol [place] in  Patient reports she was doing almost a pint everyday, she then quit. She reports she slipped one other time and this was her second time (last use date) in 12 days of drinking half a pint to a pint.  Patient reports prior to 12 days ago she would have two airplane bottles to take edge off and not get sick to wean herself off.  Patient reports she has though consumed alcohol five days out of the last 12. Patient reports she can be good for a couple days and then she will slip she reports.     Patient reports she will not consume the whole bottle but she will hide it and it will be emergency in case something sets her off.  Patient reports she usually gets the half pint and only drinks half of it previously.     Patient reports she paces self and then she falls asleep and then she will wake up around 2 to go to the bathroom and then she will take some time to fall back asleep and then usually she will be at work around 8am.    Per EHR progress note on 6/19/23:\"Zaira reports overall decreased alcohol use, but continues to drink " "alcohol periodically. Cites stress at work as a contributor to alcohol use. Daughter came with Zaira to the appointment today.  Primary concern per daughter is that Zaira's alcohol use has worsened/improved frequently in the past.  Concerned that this last time was even worse than usual and that the pattern will continue for her mom if she does not engage in more structured treatment.  Daughter was particularly concerned about Zaira's use this Spring as Zaira is always present for her granddaughter's events and there were two events she missed for them due to intoxication.\"      Per EHR progress note on 3/22/23 completed by Dr. Lane PsyD: \"Alcohol Withdrawal: Zaira decided to stop drinking on Friday night. She was feeling really sick and miserable on Saturday and Sunday. She was feeling so sick on Sunday, that she decided to drink some alcohol - had about 1/3 of one pint. Has continued to have a few drinks/day since Sunday to try to reduce the withdrawal symptoms. Was vomiting, shaking, cold/shivering, sweating, super tired, feeling weak, nauseous. Missed two days of work due to these symptoms. Trying to drink some water - would vomit it up at times. Also drinking some gatorade. Hasn't reported any changes in urination. We had talked about detox before and discussed it today. Is not interested in going to detox. Shared that there are risks to alcohol withdrawal and want to make sure she is safe. She was agreeable to me talking to Dr. Brasher.  I did go into the clinic to see if he was here today, but he was not.  She agreed to me sending him a message to let him know where she is at with the withdrawal and to see if there are any meds that could be offered to help with symptoms. She did not want me to talk to to the preceptor here today about this possibility, just Dr. Brasher.  Did not share with her  or her daughter that she stopped drinking and this was why she was feeling sick.\"         Per " "EHR progress note on 3/10/23 completed by Dr. Lane PsyD: \"Alcohol Use: alcohol use is about the same - 1 pint every day of vodka, if I drink more feel sick or unwell.  She will start the day saying \"I'm going to be strong and not drink today\" but as the day goes on she will start to drink.  Doesn't drink at work, drinks at home.  Job is at a liquor store.  Will sometimes buy her alcohol from the store where she works, but will go to other stores if she feels like she has been buying too much at the store that week.  Reports she has fallen from alcohol use\". 7/25/23, half pint, 9 or 10:00PM Possibly, but patient reports she does not feel like she is in withdrawal today. oral   has not used Marijuana          has not used Amphetamines          has not used Cocaine/crack           has not used Hallucinogens        has not used Inhalants        has not used Heroin        has not used Other Opiates        has not used Benzodiazepine          has not used Barbiturates        has not used Over the counter meds.        has use Caffeine  Per patient: everyday use  7/26/23 no oral   has not used Nicotine         has not used other substances not listed above:  Identify:                 Substance Use:  Patient reported the following biological family members or relatives with chemical health issues:  biological parents.  Patient has not received substance use disorder and/or gambling treatment in the past.  Patient has not ever been to detox.  Patient is not currently receiving any chemical dependency treatment. Patient reports no history of support group attendance.    Patient reported the following problems as a result of their substance use: family problems and occupational / vocational problems.  Patient is concerned about substance use. Patient reports family has concerned about their substance use. Patient reports their recovery goals are do not want to drink it and wants to be off it.     Patient reports " experiencing the following withdrawal symptoms within the past 12 months: nausea, sweating, shaky/jittery/tremors, diarrhea, and diminished appetite and the following within the past 30 days: nausea, sweating, shaky/jittery/tremors, diarrhea, and diminished appetite.  Patient reports since COVID her appetite and smell has not really come back and she has struggled with appetite since.  Patients reports urges to use Alcohol.  Patient reports she has used more Alcohol than intended or over a longer period of time than intended. Patient reports she has had unsuccessful attempts to cut down or control use of Alcohol. Patient reports longest period of abstinence was two weeks and return to use was due to anxiety, need it to settle self and nothing brings it on. Sometimes she feels she needs to be home as that is her safe place not because of drinking but it calms her a little bit. Patient reports it does not have to be anything that brings it on.  Patient reports she has needed to use more Alcohol to achieve the same effect.  Patient does  report diminished effect with use of same amount of Alcohol.     Patient does not report a great deal of time is spent in activities necessary to obtain, use, or recover from Alcohol effects.  Patient does not report important social, occupational, or recreational activities are given up or reduced because of Alcohol use.  Alcohol use is continued despite knowledge of having a persistent or recurrent physical or psychological problem that is likely to have caused or exacerbated by use.  Patient reports the following problem behaviors while under the influence of substances driving. Patient reports she lives [place] blocks from grocery store and work and that is about all she drives too.    Patient reports substance use has not ever impacted their ability to function in a school setting. Patient reports substance use has ever impacted their ability to function in a work setting. Patient  "reports she has missed some days and has gone in hung over some days in the past.  Patients demographics and history impact their recovery in the following ways:  none identified.  Patient reports engaging in the following recreation/leisure activities while using:  none identified.  Patient reports the following people are supportive of recovery: family.    Patient does not have a history of gambling concerns and/or treatment.  Patient does not have other addictive behaviors she is concerned about.        Dimension Scale Ratings:    Dimension 1 -  Acute Intoxication/Withdrawal: 0 - No Problem   Dimension 2 - Biomedical: 0 - No Problem   Dimension 3 - Emotional/Behavioral/Cognitive Conditions: 2 - Moderate Problem   Dimension 4 - Readiness to Change:  2 - Moderate Problem   Dimension 5 - Relapse/Continued Use/ Continued Problem Potential: 4 - Extreme Problem   Dimension 6 - Recovery Environment:  2 - Moderate Problem     Significant Losses / Trauma / Abuse / Neglect Issues:   Patient did not serve in the .  There are indications or report of significant loss, trauma, abuse or neglect issues related to: death of her son. Per EHR progress note on 3/10/23 completed by Dr. Lane PsyD: \"Son  by suicide two years ago\"  Concerns for possible neglect are not present.     Per EHR progress note on 3/10/23 completed by Dr. Lane PsyD:   \"Recent suicidal thoughts: No  Past suicidal thoughts: No  Any attempts in the past: No  Any family/friends/loved ones die by suicide: Yes: Son  by suicide two years ago  Plan or considering various methods: No  Protective factors: no h/o suicide attempt, no plan or intent, commitment to family, good social support  , stable housing and good job situation  Verbal contract for safety: N/A   Non-Suicidal Self Injurious Behavior:   No   Violence/Homicide Risk Assessment:  Problems with anger management: No  History of violence: No  History of significant damage to " "property: No  Threat made to harm or kill someone: No  Verbal contract for safety: N/A\"    Assessments:  The following assessments were completed by patient for this visit:  PHQ9:       8/26/2014     9:40 AM 3/9/2022    12:14 PM 7/12/2023     2:21 PM 7/26/2023     2:04 PM   PHQ-9 SCORE   PHQ-9 Total Score 19      PHQ-9 Total Score MyChart   22 (Severe depression)    PHQ-9 Total Score  12 22 20     GAD7:       8/26/2014     5:30 PM 3/9/2022    12:15 PM 7/26/2023     2:04 PM   ROXANN-7 SCORE   Total Score 9     Total Score  8 18     [place] Suicide Severity Rating Scale (Lifetime/Recent)      7/26/2023     4:00 PM   [place] Suicide Severity Rating (Lifetime/Recent)   Q1 Wish to be Dead (Lifetime) N   Q2 Non-Specific Active Suicidal Thoughts (Lifetime) N   Actual Attempt (Lifetime) N   Has subject engaged in non-suicidal self-injurious behavior? (Lifetime) N   Interrupted Attempts (Lifetime) N   Aborted or Self-Interrupted Attempt (Lifetime) N   Preparatory Acts or Behavior (Lifetime) N   Calculated C-SSRS Risk Score (Lifetime/Recent) No Risk Indicated   Patient denied past or current SI. Denied past suicide attempts.     GAIN-sliding scale:      7/26/2023     2:00 PM   When was the last time that you had significant problems...   with feeling very trapped, lonely, sad, blue, depressed or hopeless about the future? Past month   with sleep trouble, such as bad dreams, sleeping restlessly, or falling asleep during the day? Past Month   with feeling very anxious, nervous, tense, scared, panicked or like something bad was going to happen? Past month   with becoming very distressed & upset when something reminded you of the past? Past month   with thinking about ending your life or committing suicide? Never          7/26/2023     2:00 PM   When was the last time that you did the following things 2 or more times?   Lied or conned to get things you wanted or to avoid having to do something? Past month   Had a hard time paying " attention at school, work or home? Past month   Had a hard time listening to instructions at school, work or home? Past month   Were a bully or threatened other people? Never   Started physical fights with other people? Never     Safety Assessment:   Patient denies current homicidal ideation and behaviors.  Patient denies current self-injurious ideation and behaviors.    Patient reported unsafe motor vehicle operation associated with substance use.  Patient reported substance use associated with mental health symptoms.  Patient reports the following current concerns for their personal safety: None.  Patient reports there not firearms in the house.        History of Safety Concerns:  Patient denied a history of homicidal ideation.     Patient denied a history of personal safety concerns.    Patient denied a history of assaultive behaviors.    Patient denied a history of sexual assault behaviors.     Patient reported a history unsafe motor vehicle operation associated with substance use.  Patient reported a history of substance use associated with mental health symptoms.  Patient reports the following protective factors:      Risk Plan:  See Recommendations for Safety and Risk Management Plan    Review of Symptoms per patient report:   Substance Use:  hangovers, daily use, work absence due to substance use, family relationship problems due to substance use, driving under the influence, and cravings/urges to use     Diagnostic Criteria:  Substance Use Disorder Substance is often taken in larger amounts or over a longer period than was intended.  Met for:  Alcohol There is persistent desire or unsuccessful efforts to cut down or control use of the substance.  Met for:  Alcohol Craving, or a strong desire or urge to use the substance.  Met for:  Alcohol Continued use of the substance despite having persistent or recurrent social or interpersonal problems caused or exacerbated by the effects of its use.  Met for:  Alcohol  Recurrent use of the substance in which it is physically hazardous.  Met for:  Alcohol Tolerance:  either a need for markedly increased amounts of the substance to achieve the desired effect or a markedly diminished effect with continued use of the same amount of the substance.  Met for:  Alcohol Withdrawal:  either patient endorses characteristic withdrawal syndrome for the substance or the substance (or closely related substance) is taken to relieve or avoid withdrawal symptoms.  Met for:  Alcohol      Collateral Contact Summary:   Collateral contacts contributing to this assessment:  none besides EHR/HMR    If court related records were reviewed, summarize here: none    Information from collateral contacts supported/largely agreed with information from the client and associated risk ratings.    Information in this assessment was obtained from the medical record and provided by patient who is a good historian.    Patient will have open access to their mental health medical record.    Diagnostic Criteria: Substance Use Disorder Substance is often taken in larger amounts or over a longer period than was intended.  Met for:  Alcohol There is persistent desire or unsuccessful efforts to cut down or control use of the substance.  Met for:  Alcohol Craving, or a strong desire or urge to use the substance.  Met for:  Alcohol Continued use of the substance despite having persistent or recurrent social or interpersonal problems caused or exacerbated by the effects of its use.  Met for:  Alcohol Recurrent use of the substance in which it is physically hazardous.  Met for:  Alcohol Tolerance:  either a need for markedly increased amounts of the substance to achieve the desired effect or a markedly diminished effect with continued use of the same amount of the substance.  Met for:  Alcohol Withdrawal:  either patient endorses characteristic withdrawal syndrome for the substance or the substance (or closely related substance)  is taken to relieve or avoid withdrawal symptoms.  Met for:  Alcohol      As evidenced by self report and criteria, client meets the following DSM5 Diagnoses:   (Sustained by DSM5 Criteria Listed Above)  Alcohol Use Disorder   303.90 (F10.20) Severe     Recommendations:     1. Plan for Safety and Risk Management:  A safety and risk management plan has been developed including: When the [place] Suicide Severity Rating Scale has been completed, the patient identifies lifetime history of suicidal ideation and/or Suicidal Behavior that is greater than 10 years.      The recommendation is to provide the Brief Safety Plan:    Adult Short Safety Plan:   Name: Zaira Ahn  YOB: 1960  Date: July 26, 2023   My primary care provider: Wei Brasher  My primary care clinic: [facility]  My prescriber: Dr. Brasher  Other care team support:  Dr. Lane PsyD   My Triggers:  Relationship conflict, Work  difficulties, Home environment, and Substance Use     Additional People, Places, and Things that I can access for support: reading, walking, her daughter, Kailey Dunn, [phone]         What is important to me and makes life worth living: family .         GREEN    Good Control  1. I feel good  2. No suicidal thoughts   3. Can work, sleep and play      Action Steps  1. Self-care: balanced meals, exercising, sleep practices, etc.  2. Take your medications as prescribed.  3. Continue meetings with therapist and prescriber.  4.  Do the healthy things that I enjoy.  5. reading           YELLOW  Getting Worse  I have ANY of these:  1. I do not feel good  2. Difficulty Concentrating  3. Sleep is changing  4. Increase/Change in my thoughts to hurt self and/or others, but I can still manage and not act on it.   5. Not taking care of self.  6. Use episodes of alcohol             Action Steps (in addition to the above):  1. Inform your therapist and psychiatric prescriber/PCP.  2. Keep taking your medications  as prescribed.    3. Turn to people you can ask for help.  4. Use internal coping strategies -see below.  5. Create safe environment: notify friends/family of increase in symptoms             RED  Get Help  If I have ANY of these:  1. Current and uncontrollable thoughts and/or behaviors to hurt self and/or others.   2. relapse   Actions to manage my safety  1. Contact your emergency person daughter, Kailey Dunn, [phone]  2. Call or Text [phone]  3. Call my crisis team- [facility] [phone] [facility] Psych Emergencies  3. Or Call 911 or go to the emergency room right away          My Internal Coping Strategies include the following:  use my coping skills    [End for Brief Safety Plan]     Safety Concerns  How To Identify Situations That Make Your Mental Health Worse:  Triggers are things that make your mental health worse.  Look at the list below to help you find your triggers and what you can do about them.     1. Identify Early Warning Signs:    Sometimes symptoms return, even when people do their best to stay well. Symptoms can develop over a short period of time with little or no warning, but most of the time they emerge gradually over several weeks.  Early warning signs are changes that people experience when a relapse is starting. Some early warning signs are common and others are not as common.   Common Early Warning Signs:    Feeling tense or nervous, Eating less or eating more, Trouble sleeping -either too much or too little sleep, Feeling depressed or low, Urges to harm self, and Urges to use drugs or alcohol     2. Identify action steps to take when warning signs are noticed:    Taking Action- It is important to take action if you are experiencing early warning signs of a relapse.  The faster you act, the more likely it is that you can avoid a full relapse.  It is helpful to identify several specific ways to cope with symptoms.      The following is my list of symptoms and  coping strategies that I can use when they are present:    Symptom Coping Strategies   Anxiety -Talk with someone in your support system and let him or her know how you are feeling.  -Use relaxation techniques such as deep breathing or imagery.  -Use positive affirmations to counteract negative self-talk such as  I am learning to let go of worry.    Depression - Schedule your day; include activities you have to do and activities you enjoy doing.  - Get some exercise - walk, run, bike, or swim.  - Give yourself credit for even the smallest things you get done.   Sleep Difficulties   - Go to sleep at the same time every day.  - Do something relaxing before bed, such as drinking herbal tea or listening to music.  - Avoid having discussions about upsetting topics before going to bed.   Delusions   - Distract yourself from the disturbing thought by doing something that requires your attention such as a puzzle.  - Check out your beliefs by talking to someone you trust.    Hallucinations   - Use headphones to listen to music.  - Tell voices to  stop  or say to yourself,  I am safe.   - Ignore the hallucinations as much as possible; focus on other things.   Concentration Difficulties - Minimize distractions so there is only one thing for you to focus on at a time.    - Ask the person you are having a conversation with to slow down or repeat things you are unsure of.         .  Patient consented to co-developed safety plan.  Safety and risk management plan was completed.  Patient agreed to use safety plan should any safety concerns arise.  A copy was given to the patient..      Report to child / adult protection services was NA.     2. SARAY Referrals:   Recommendations:  1. Abstain from using all non-prescribed mood altering chemicals and substances. Take all medication as prescribed and directed by licensed physicians.  2. Attend and complete either a co-occurring or IOP treatment program such as [facility] or [facility]. Follow treatment providers recommendations.  3. Continue with outpatient therapist and follow all recommendations and referrals made by provider.  4. Follow all recommendations and referrals made by your established providers such as your pcp.  Patient reports they are willing to follow these recommendations.  Patient would like the following family or other support people involved in their treatment:  daughter as EC. Patient does not have a history of opiate use.    3. Mental Health Referrals:  none      4. Clinical Substantiation/medical necessity for the above recommendations:  Pt reported her use has negatively impacted multiple areas of her life and she currently meets criteria for a substance use disorder occurring within a 12-month period. Patient has never attended a treatment program and reported she would like professional help. Pt would appear to benefit from learning her personal relapse process along with early warning signs and triggers. Pt appears to lack impulse control, sober coping skills, and long-term sober maintenance skills. Pt is at a high risk for relapse/continued use.     5. Patient did not identify Quaker, ethnic or cultural issues relevant to therapy at this time.Patient encouraged to ask for help should needs arise in the course of treatment.       6. Recommendations for treatment focus:   Alcohol / Substance Use - recovery skills.     7.  Interactive Complexity: No         Provider Name/ Credentials:  Sobeida Hodge, [facility], [facility]   July 26, 2023

## 2023-07-26 NOTE — TELEPHONE ENCOUNTER
The below telephone note was routed to the [facility] UR department at:  [facility] UR [[id number]] and to the [facility] IOP Admissions Department at: [facility] ADULT IOP INTAKE POOL [[id number]] on 7/26/23 as a referral for OP treatment at [facility].     A.)  Name: Zaira Jimy FLORES  MRN: [id number]  C.)  Notes: Please call the patient at  [phone] to discuss and  to initiate IOP with co-occurring IOP in [place], preferable the 1-4:00PM group,  as soon as possible.  D.) Insurance:  Federal  E.) Notes:     Thanks very much!    Sobeida Hodge, LPCC, LADC

## 2023-07-27 NOTE — TELEPHONE ENCOUNTER
----- Message from Sussy Walker sent at 7/27/2023  9:20 AM CDT -----  Regarding: SERVICE INITIATION  SERVICE INITIATION SCHEDULED ON 8/2, [place] Afternoon

## 2023-08-01 ENCOUNTER — TELEPHONE (OUTPATIENT)
Dept: BEHAVIORAL HEALTH | Facility: CLINIC | Age: 63
End: 2023-08-01
Payer: COMMERCIAL

## 2023-08-01 ENCOUNTER — HOSPITAL ENCOUNTER (OUTPATIENT)
Dept: BEHAVIORAL HEALTH | Facility: CLINIC | Age: 63
Discharge: HOME | End: 2023-08-01
Attending: FAMILY MEDICINE
Payer: COMMERCIAL

## 2023-08-01 PROCEDURE — H2035 A/D TX PROGRAM, PER HOUR: HCPCS | Performed by: COUNSELOR

## 2023-08-01 ASSESSMENT — ANXIETY QUESTIONNAIRES
GAD7 TOTAL SCORE: 9
6. BECOMING EASILY ANNOYED OR IRRITABLE: NEARLY EVERY DAY
3. WORRYING TOO MUCH ABOUT DIFFERENT THINGS: MORE THAN HALF THE DAYS
4. TROUBLE RELAXING: NOT AT ALL
2. FELT ANXIOUS, WORRIED, OR NERVOUS: NEARLY EVERY DAY
7. FEELING AFRAID AS IF SOMETHING AWFUL MIGHT HAPPEN: NOT AT ALL
2. NOT BEING ABLE TO STOP OR CONTROL WORRYING: SEVERAL DAYS
5. BEING SO RESTLESS THAT IT IS HARD TO SIT STILL: NOT AT ALL

## 2023-08-01 NOTE — PROGRESS NOTES
"Client:  Zaira Ahn  MRN: [id number]    8/1/2023  START TIME: 2:30 PM   END TIME: 3:50 PM   Duration: 2 Hours    Visit Purpose:    The patient met with this writer, Tammy Wade , for both an individual session and service initiation.     We discussed the impact of their substance use on the different areas of their life, and the patient identified which areas are a priority for them.  The patient shared what they have been doing between the initial comprehensive assessment date and today's appointment.  This writer suggested several coping skills that the patient can begin using immediately.  The patient also shared their individual motivation for recovery during this session.      Any assessment updates are noted below.      Comprehensive Assessment UPDATE/ISP/VAA/[place] Re-Assess     Comprehensive Assessment Update: 8/1/2023    Comprehensive assessment dated 7/26/23 was reviewed and updates are as follows:     Reason for visit today: Individual therapy session and [facility] Co-Occurring Intensive Outpatient Program service initiation.    Dates of last use and substance(s) used:  Last use alcohol on 7/29/23     Patient does not have a history of opiate use.    Safety concerns:  No safety concerns reported.       Other:  NA    Health Screening:  Given patient's past history, a medication, and physical condition, is there a fall risk?  No  Does the patient have any pain? No  Is the patient on a special diet? If yes, please explain: no  Does the patient have any concerns regarding your nutritional status? If yes, please explain: no \"Not really, I really don't eat. I had COVID and lost my smell and taste senses and food is not currently tasting good for me. I don't enjoy eating right now. I do ensure shakes and drink a lot of water with electrolytes.\"   Has the patient had any appetite changes in the last 3 months?  No  Has the patient had any weight loss or weight gain in the last 3 months? " "No  Has the patient have a history of an eating disorder or been over-eating, avoiding meals, or inducing vomiting?  Yes, I had Bulimia in my 20's which is now stable.  Does the patient have any dental concerns? (Problems with teeth, pain, cavities, braces)?  YES \"I have a broken tooth from an old filling. I have a huge dental phobia. I usually need to be put under to get work done. The fingers in my mouth freaks me out. I don't want to be judged either.\" Last saw the dentist 5 years ago, current tooth does not ache. One of the filling fell out, \"I used [place] whiskey on a cotton ball and killed the nerve over time.\"  Are immunizations up to date?  Yes  Any recent exposure to TB, Hepatitis, Measles, or Strep?  No    Brief Biosocial Gambling Screening:  Do you have any current or past concerns regarding gambling?  no  If YES:  Have you ever participated in a gambling treatment program?  no  During the past 12 months, have you become restless, irritable or anxious when trying to stop/cut down on gambling?  no  During the past 12 months, have you tried to keep your family or friends from knowing how much you gambled?  no  During the past 12 months, did you have such financial trouble, as a result of your gambling, that you had to get help with living expenses from family, friends, or welfare?  no      Dimension Scale Ratings:    Dimension 1: 1 Client can tolerate and cope with withdrawal discomfort. The client displays mild to moderate intoxication or signs and symptoms interfering with daily functioning but does not immediately endanger self or others. Client poses minimal risk of severe withdrawal.  Dim 1 Narrative:  Last used a pint of vodka on 7/29/23. Experiencing some chills.     Dimension 2: 0 Client displays full functioning with good ability to cope with physical discomfort.  Dim 2 Narrative:  High blood pressure and takes blood pressure medications as prescribed. Not a current barrier to programming. " "    Dimension 3: 2 Client has difficulty with impulse control and lacks coping skills. Client has thoughts of suicide or harm to others without means; however, the thoughts may interfere with participation in some treatment activities. Client has difficulty functioning in significant life areas. Client has moderate symptoms of emotional, behavioral, or cognitive problems. Client is able to participate in most treatment activities.  Dim 3 Narrative:  Reports experience of ongoing grief and loss. Her son  two years ago. Identifies reading as the only coping skills used. Identifies daughter as a good accountability figure and that she calls everyday to check in on her.     Dimension 4: 2 Client displays verbal compliance, but lacks consistent behaviors; has low motivation for change; and is passively involved in treatment.  Dim 4 Narrative:  \"I need recovery but my  and daughter is more worried.\"    Dimension 5: 3 Client has poor recognition and understanding of relapse and recidivism issues and displays moderately high vulnerability for further substance use or mental health problems. Client has few coping skills and rarely applies coping skills.  Dim 5 Narrative:   Recent relapse on . Lacks coping tools to cope with difficult experiences and symptoms without alcohol use.     Dimension 6: 3 Client is not engaged in structured, meaningful activity and the client's peers, family, significant other, and living environment are unsupportive, or there is significant criminal justice system involvement.  Dim 6 Narrative:  There is no legal involvement currently. Identifies , daughter and boss as supportive. Reports not feeling needed and cared for at home due to family hurting from grief. Describes herself as \"emotional\" and her daughter and  as \"rational.\" Reports to feeling more similar to her late son. Continuing to work on building her relationship with her daughter. 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    Initial Service Plan (ISP)    Immediate health, safety, and preliminary service needs identified and plan includes the following based on available information from clients, referral sources, and collateral information.    Safety (SI, SIB, suicide attempts, aggressive behaviors):  History of SI (suicidal ideation)?  No  History of SIB (self-injurious behavior)?  No  History of VI (violent ideation)?  No  History of HI (homicidal ideation)?  No    No additional safety plan required at this time      Name: Zaira Ahn  YOB: 1960  Date: 8/1/2023  My primary care provider: Dr. Brasher   [facility]  My prescriber: Dr. Brasher Other care team support:  Dr. Bosch for psychiatry.    My Triggers (check the ones that apply to you):   Relationship Conflict-With spouse at times  Work Difficulties- Works at a [employer] store and feels overwhelmed sometimes  School Concerns- NA  Home Environment-  \"I don't always feel like people ask me what I need\"  Social Support- My manager at work.  Peer Relationships- Not many  Financial Concerns- None  Housing Concerns- None  Medical/Health- None  Substance Use-Ongoing use of alcohol.  Legal Difficulties- None       Additional People, Places, and Things that I can access for support:     Nothing else right now.      What is important to me and makes life worth living:   My family. I have 2 granddaughters, 12 and 14, and my .        GREEN    Good Control  1. I feel good  2. No suicidal thoughts   3. Can work, sleep and play      Action Steps  1. Self-care: balanced meals, exercising, sleep practices, etc.  2. Take your medications as prescribed.  3. Continue meetings with prescriber.  4.  Do the healthy things that I enjoy.  5. Other: NA                 YELLOW  Getting Worse  I have ANY of these:  1. I do not feel good  2. Difficulty Concentrating  3. Sleep " "is changing  4. Increase/Change in my thoughts to hurt self and/or others, but I can still manage and not act on it.   5. Not taking care of self.  6. Other:  NA               Action Steps (in addition to the above):  1. Inform your therapist and psychiatric prescriber/PCP.  2. Keep taking your medications as prescribed.    3. Turn to people you can ask for help.  4. Use internal coping strategies -see below.  5. Create safe environment:        -Store firearms for safety       -Lock and limit medications       -Notify friends/family of increase in symptoms  6. Other: NA                RED  Get Help  If I have ANY of these:  1. Current and uncontrollable thoughts and/or behaviors to hurt self and/or others.   2. Other: NA   Actions to manage my safety  1.Contact your emergency person:   -Name:  -Number:  2. Call or Text [phone]  3. Call my crisis team-         -County:        -Number:  3. Or Call 911 or go to the emergency room right away  4.  Other: NA               My Internal Coping Strategies include the following ([place] ones you use):  Take a bath  Blow bubbles  Belly breathing  Arts & Crafts  Coloring  Chew gum  Fidget toys: \"I am a fidgeter but no fidgets.\"   Safe space: \"me bedroom\"  Time with pets: \"I have a 4 year old basset hound named Rad and my son's cat, Brianne. Rad sleeps with me.\"  Coping toolbox  Temperature change  Exercise  Other: NA    Safety Concerns  How To Identify Situations That Make Your Mental Health Worse:  Triggers are things that make your mental health worse.  Look at the list below to help you find your triggers and what you can do about them.     1. Identify Early Warning Signs:    Sometimes symptoms return, even when people do their best to stay well. Symptoms can develop over a short period of time with little or no warning, but most of the time they emerge gradually over several weeks.  Early warning signs are changes that people experience when a relapse is starting. Some early " "warning signs are common and others are not as common.   Common Early Warning Signs:   Yes, Feeling tense or nervous: \"Yes but I don't know why. I think a lot of it might be my working situation making me nervous. 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 I don't want to leave my boss because we are close.\"  Eating less or more  Trouble sleeping  Yes, Feeling depressed or low: \"Experience this almost everyday. My psychiatrist does not prescribe medications to me so I will need to see someone who will. I would like to get help managing my anxiety which can come on so suddenly. I have not heard about antabuse but I would like to get some help managing my cravings.\"   Yes, Feeling irritable: \"At work towards co-workers who don't work well but I hide it well. I feel alone at work because they can band against me sometimes.\"  Yes, Isolating: \"I get lost in my books and stay to myself.\"  Yes, Trouble concentrating: \"I get brain fog but I think it is due to COVID. Maybe alcoholism too.\"  Urges to harm self  Urges to harm others  Other: NA      2. Identify action steps to take when warning signs are noticed:    Taking Action- It is important to take action if you are experiencing early warning signs of a relapse.  The faster you act, the more likely it is that you can avoid a full relapse.  It is helpful to identify several specific ways to cope with symptoms.      The following is my list of symptoms and coping strategies that I can use when they are present:    Symptom Coping Strategies   Anxiety -Talk with someone in your support system and let him or her know how you are feeling.  -Use relaxation techniques such as deep breathing or imagery.  -Use positive affirmations to counteract negative self-talk such as  I am learning to let go of worry.    Depression - Schedule your day; include activities you must do and " activities you enjoy doing.  - Get some exercise - walk, run, bike, or swim.  - Give yourself credit for even the smallest things you get done.   Sleep Difficulties   - Go to sleep at the same time every day.  - Do something relaxing before bed, such as drinking herbal tea or listening to music.  - Avoid having discussions about upsetting topics before going to bed.   Delusions   - Distract yourself from the disturbing thought by doing something that requires your attention such as a puzzle.  - Check out your beliefs by talking to someone you trust.    Hallucinations   - Use headphones to listen to music.  - Tell voices to  stop  or say to yourself,  I am safe.   - Ignore the hallucinations as much as possible; focus on other things.   Concentration Difficulties - Minimize distractions so there is only one thing for you to focus on at a time.    - Ask the person you are having a conversation with to slow down or repeat things you are unsure of.      Patient consented to co-developed safety plan.  Safety and risk management plan was completed.  Patient agreed to use safety plan should any safety concerns arise.  A copy was given to the patient.     Health:  Client does NOT have health issues that would impede participation in treatment    Transportation: Drive to treatment.       Patient does not have any identified barriers to participating in referred services.    Vulnerable Adult Assessment    Does the patient possess a physical or mental infirmity or other physical, mental, or emotional dysfunction?  No. Patient is not a vulnerable adult.    This patient is not a functional Vulnerable Adult according to [place] Statute 626.5572 subdivision 21.      Treatment suggestions for client for the time period until the initial treatment planning session:  Urges and Cravings worksheet and familiarizing with basic self-care.    [place] Re-Assessment:     Have you ever wished you were dead or that you could go to sleep  and not wake up? Lifetime?  No   Past Month?  No     Have you actually had any thoughts of killing yourself?  Lifetime?  No   Past Month?  No     Have you been thinking about how you might do this? Lifetime?  No   Past Month?  No     Have you had these thoughts and had some intention of acting on them?  Lifetime?  No   Past Month?  No     Have you started to work out the details of how to kill yourself?  Lifetime?  No   Past Month?  No     Do you intend to carry out this plan?   No     When you have the thoughts how long do they last?   N/A    Are there things - anyone or anything (ie Family, Baptist, pain of death) that stopped you from wanting to die or acting on thoughts of suicide?   Does not apply        2008  [employer]  Used with permission by Kelsey Adams, PhD.      Tammy Wade, [facility], [facility]       8/1/2023     2:30 PM

## 2023-08-01 NOTE — TELEPHONE ENCOUNTER
----- Message from Tammy Wade, ERIN, FLOC sent at 8/1/2023  4:42 PM CDT -----  Regarding: Please schedule  Please schedule this client for a 60-min 1:1 with me ([id number]) on 8/2 at 1pm and on 8/3 at 11am for an individual session/individual treatment planning/DA. This will be an in-person visit at the [facility] in [place].     Thank you,   Tammy Wade, RUSLAN, VANDANA, RAFAELC

## 2023-08-01 NOTE — ADDENDUM NOTE
Encounter addended by: Tammy Wade LPCC, LADC on: 8/1/2023 6:04 PM   Actions taken: Clinical Note Signed

## 2023-08-01 NOTE — PROGRESS NOTES
Comprehensive Assessment Summary     Based on client interview, review of previous assessments and   comprehensive assessment interview the following diagnosis and recommendations are:     Client: Zaira Ahn  MRN; [id number]   : 1960  Age: 62 year old Sex: female      Client meets criteria for:  303.90 (F10.20) Alcohol Use Disorder Severe    Dimension One: Acute Intoxication/Withdrawal Potential:  Risk Ratin.      Ct reports that her last use of Alcohol was on 23. Client displays mild withdrawal symptomology at this time such as Yes, Client reports the following withdrawal symptoms; anxiety, depression, and chills.       Dimension Two: Biomedical Condition and Complications:  Risk Ratin.     Client denies having any chronic biomedical conditions that would interfere with treatment or any recovery skills training/workshop. Ct does endorse having the following medical conditions; high blood pressure. Ct reports taking the following medications at this time;   Current Outpatient Medications   Medication    atenolol-chlorthalidone (TENORETIC) 50-25 MG tablet    atorvastatin (LIPITOR) 40 MG tablet    metFORMIN (GLUCOPHAGE-XR) 500 MG 24 hr tablet    nicotine (NICODERM CQ) 14 MG/24HR 24 hr patch    sertraline (ZOLOFT) 25 MG tablet     No current facility-administered medications for this encounter.     Ct denies having pain at this time and reports her pain level is a 0 on the 0-10 Pain Rating Scale. Ct denies any consumption of nicotine products at this time.    Dimension Three: Emotional/Behavioral/Cognitive Conditions & Complications:  Risk Ratin.   Client reports having mental health diagnosis of 296.32 (F33.1) Major Depressive Disorder, Recurrent Episode, Moderate _ and With anxious distress. Ct reports having the following MH symptoms at time of program intake: isolating / withdrawn, lack of energy, loss of interest / pleasure, low mood, trouble concentrating, irritability,  "agitation, depressed, hopeless, impaired thinking, impulsivity / disinhibition, anxiety, shaky/jittery , overwhelmed, and helplessness . Ct reports that she is not currently taking medications. Ct reports to not having the previous MH treatment. At time of intake client denies having community MH providers. Ct lacks sober coping skills and impulse control. Ct lacks emotional and stress management skills. At time of intake, Ct reports having the following risk factors, substance abuse. At time of intake, Ct reports having the following protective factors, Supportive friends and/or family, Future-oriented, , Is a parent, Stable housing, and Access to mental health resources. At time of intake, Ct reports her social functioning/environment as the following good support network, tendency to isolate from others, no history of legal charges, and currently employed. Ct denies having any SIB's/SI's/HI's at this time.     Dimension Four: Treatment Acceptance/Resistance:  Risk Ratin.    Client displays verbal compliance and motivation but lacks consistent behaviors and follow-through. Ct has continued to use despite desire to quit. Ct appears to be in the \"2\" Stage within the Stages of Change Model.     Dimension Five: Relapse/Continued Problem Potential:  Risk Rating: 3.          Client have not been involved in past treatments, past detox admissions, and 12-Step support group participation. Ct reports having minimal sober time (last used ) and has tried to quit drinking in the past but relapsed. Ct lacks insight into her personal relapse process along with early warning signs and triggers. Ct lacks impulse control along with sober coping skills. Ct lacks insight into the effects her use has had on her physical and mental health. Ct lacks appropriate recovery skills, and is at a moderate to high risk for relapse/continued use.    Dimension Six: Recovery Environment:  Risk Rating: 3.      Client reported " familial and occupational stressors at time of program intake. Ct reports her relationship status at time of intake was, . Ct reports that her current living situation is supportive towards her recovery. Ct reports that she has stable housing and has no concerns at this time. Ct reports that she lacks a daily structure and meaningful activities. 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 Pt reports graduating high school.. Ct reports fracturing relationships with family and friends due to her use. Ct lacks a sober support network. Ct reports that she has no legal involvement.    I have reviewed the information on the assessment, medical history and checklist. Any pertinent updated information is included above, which was obtained from the client before attending the first day in group.

## 2023-08-01 NOTE — PROGRESS NOTES
"Individual Session Summary   START TIME: 2:30 PM   END TIME: 3:50 PM   Duration: 2 Hours    Data:  Met with client on this date for an individual session and service intake. The session focused on client's initial engagement in programming. Zaira shared that she has been in pain struggling with grief and loss of her son, who passed away 2 years ago from an overdose. She reflected on their last conversation and said that she told her that she was \"not a good mom\" and she felt that conversation replaying for her often. She endorse regrets, guilt and shame. Zaira shared how feeling guilty \"causes me to drink because when I drink I don't feel that way at least for a short while.\" She reports that she endorses anxiety \"feeling nervous and a need to just make this feeing go away.\" Zaira last used alcohol on 7/29 and denies using other substances. She reported some curiosity about marijuana and  CBD products. 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 She noted that it does create temptations and has added stress and triggers in her interactions with her co-workers. She identifies having a good relationship with her boss and contemplative about leaving because she doesn't want to looks that friendship. Zaira reports that she does not have many peer connections and that she feels isolated in her family describing herself as \"emotional\" and her  and daughter as \"very rational.\" She states that she felt more similar to her son. Zaira identifies her family as her biggest support and also a \"work in progress.\" She noted the pain the family is going through and th different ways in which everyone is handling their grief. She reports that she is there for them but feels neglected stating \"no one ever asks me what I need or what can I do to take car of you right now.\" Zaira denies a history of mental health but states that she has low mood, " "frequent crying on a daily basis. States that she is connected to a psychiatrist that will not prescribe medications. Zaira reports that she is interested in medication management to aid in her experience of cravings and anxiety. She was provided information on antabuse.    Zaira asked if she could begin the program on 8/14 and states that [place]. She demonstrated willingness to engage in a plan for sobriety for that week at the next meeting.     Intervention: Information sharing, assessments on symptoms, CBT, MI, goal planning, Group orientation and information.      Assessment: Zaira was tearful and expressed significant feelings of loss. She was appropriately dressed and understood the materials presented to her. She made jokes about her drinking motioning drinking from a glass and asking \"would this count as exercising?\" Then added \"I can be classy, I use a straw sometimes.\" Speech and affect was dysphoric.     Plan. Client will attend 1:1 appointment on 8/2 to complete treatment plan. She will attend 1:1 on 8/3 to complete a DA. Zaira will use the Urge and Cravings worksheet to process experiences. Zaira will begin programming on 8/14.         Tammy Wade, RAFAELC, LADC   "

## 2023-08-02 ENCOUNTER — TELEPHONE (OUTPATIENT)
Dept: BEHAVIORAL HEALTH | Facility: CLINIC | Age: 63
End: 2023-08-02
Payer: COMMERCIAL

## 2023-08-02 ENCOUNTER — BEH TREATMENT PLAN (OUTPATIENT)
Dept: BEHAVIORAL HEALTH | Facility: CLINIC | Age: 63
End: 2023-08-02
Payer: COMMERCIAL

## 2023-08-02 ENCOUNTER — HOSPITAL ENCOUNTER (OUTPATIENT)
Dept: BEHAVIORAL HEALTH | Facility: CLINIC | Age: 63
Discharge: HOME | End: 2023-08-02
Attending: FAMILY MEDICINE
Payer: COMMERCIAL

## 2023-08-02 PROCEDURE — H2035 A/D TX PROGRAM, PER HOUR: HCPCS | Performed by: COUNSELOR

## 2023-08-02 NOTE — PROGRESS NOTES
"Individual Session Summary   START TIME: 1 PM   END TIME: 2 PM   Duration: 1 Hour      Data:  Met with client on this date for an individual session. The session focused on developing client's individual treatment plan. Zaira reports that her alcohol use date has not changed. She shared that she had a hard night sleeping and that she typically have difficulties with sleep when she is not drinking. 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 She reports that her boss told her that he needs her to work tomorrow and shared personal stories of her boss that requires him to go to [facility] and for her to be in charge of managing the store. Zaira shared that her boss appreciates her a lot and gives her good feedback and that she feels good about that. She identifies stressors and frustration in the workers and talked about generational differences in work ethics that causes her to feel like she is more overworked than them. She said that she plans to take more breaks from work. Discussed plan about working at the [employer] store and pros and cons. She continues to be contemplative, wanting to quit to maintain sobriety, valuing her and wanting to protect her relationship with her boss and identifying employment stressors. Zaira reports that she plans to remain sober in the upcoming week and identified her  and daughter as her accountability figures. Zaira practiced guided box breathing to cope with \"nervousness that makes me want to grab a bottle\" and information on using the Managing Urges and Cravings worksheet pairing with distractions when urges increases. Zaira identified reading and spending time with her grandchildren as good distractions to cope. Zaira reports that she is ready to start group and looks forward to group accountability and " "people with stories to relate to.     Intervention: Mindfulness, goal planning, CBT, MI, pschoeducation.      Assessment: Zaira appeared tired with red eyes and skin. She engaged well in goal planning and presented with good motivation to begin programming. Zaira becomes emotional when talking about her son and appear to feel \"stuck in grief.\"     Plan. Client will practice box breathing for 1 minute in the morning and when experiencing anxiety. Client will utilize Managing Urges and Cravings worksheet to investigate experiences and use skills. Client will schedule with a counselor to complete her DA.          Tammy Wade, LPCC, LADC   "

## 2023-08-02 NOTE — TELEPHONE ENCOUNTER
----- Message from ERIN Vargas LADC sent at 2023  5:32 PM CDT -----  Regarding: Please schedule  [facility] [address], [place] [address]  Start Date: 23  Group: [id number] on #,  and # at #1pm-4pm#  Provider: RUSLAN Vargas, VANDANA, ERIN   Number of visits to be scheduled: 30 sessions  Length/Duration of Appointment in minutes: 3 hrs  Visit Type: In person  Additional notes:    Tammy Turk

## 2023-08-02 NOTE — PROGRESS NOTES
"     Client:  Zaira Ahn  MRN: [id number]     8/1/2023  START TIME: 2:30 PM   END TIME: 3:50 PM   Duration: 2 Hours     Visit Purpose:     The patient met with this writer, Tammy Wade , for both an individual session and service initiation.      We discussed the impact of their substance use on the different areas of their life, and the patient identified which areas are a priority for them.  The patient shared what they have been doing between the initial comprehensive assessment date and today's appointment.  This writer suggested several coping skills that the patient can begin using immediately.  The patient also shared their individual motivation for recovery during this session.       Any assessment updates are noted below.       Comprehensive Assessment UPDATE/ISP/VAA/[place] Re-Assess      Comprehensive Assessment Update: 8/1/2023     Comprehensive assessment dated 7/26/23 was reviewed and updates are as follows:      Reason for visit today: Individual therapy session and [facility] service initiation.     Dates of last use and substance(s) used:  Last use alcohol on 7/29/23      Patient does not have a history of opiate use.     Safety concerns:  No safety concerns reported.       Other:  NA     Health Screening:  Given patient's past history, a medication, and physical condition, is there a fall risk?  No  Does the patient have any pain? No  Is the patient on a special diet? If yes, please explain: no  Does the patient have any concerns regarding your nutritional status? If yes, please explain: no \"Not really, I really don't eat. I had COVID and lost my smell and taste senses and food is not currently tasting good for me. I don't enjoy eating right now. I do ensure shakes and drink a lot of water with electrolytes.\"   Has the patient had any appetite changes in the last 3 months?  No  Has the patient had any weight loss or weight gain in " "the last 3 months? No  Has the patient have a history of an eating disorder or been over-eating, avoiding meals, or inducing vomiting?  Yes, I had Bulimia in my 20's which is now stable.  Does the patient have any dental concerns? (Problems with teeth, pain, cavities, braces)?  YES \"I have a broken tooth from an old filling. I have a huge dental phobia. I usually need to be put under to get work done. The fingers in my mouth freaks me out. I don't want to be judged either.\" Last saw the dentist 5 years ago, current tooth does not ache. One of the filling fell out, \"I used [place] whiskey on a cotton ball and killed the nerve over time.\"  Are immunizations up to date?  Yes  Any recent exposure to TB, Hepatitis, Measles, or Strep?  No     Brief Biosocial Gambling Screening:  Do you have any current or past concerns regarding gambling?  no  If YES:  Have you ever participated in a gambling treatment program?  no  During the past 12 months, have you become restless, irritable or anxious when trying to stop/cut down on gambling?  no  During the past 12 months, have you tried to keep your family or friends from knowing how much you gambled?  no  During the past 12 months, did you have such financial trouble, as a result of your gambling, that you had to get help with living expenses from family, friends, or welfare?  no        Dimension Scale Ratings:     Dimension 1: 1 Client can tolerate and cope with withdrawal discomfort. The client displays mild to moderate intoxication or signs and symptoms interfering with daily functioning but does not immediately endanger self or others. Client poses minimal risk of severe withdrawal.  Dim 1 Narrative:  Last used a pint of vodka on 7/29/23. Experiencing some chills.      Dimension 2: 0 Client displays full functioning with good ability to cope with physical discomfort.  Dim 2 Narrative:  High blood pressure and takes blood pressure medications as prescribed. Not a current barrier " "to programming.      Dimension 3: 2 Client has difficulty with impulse control and lacks coping skills. Client has thoughts of suicide or harm to others without means; however, the thoughts may interfere with participation in some treatment activities. Client has difficulty functioning in significant life areas. Client has moderate symptoms of emotional, behavioral, or cognitive problems. Client is able to participate in most treatment activities.  Dim 3 Narrative:  Reports experience of ongoing grief and loss. Her son  two years ago. Identifies reading as the only coping skills used. Identifies daughter as a good accountability figure and that she calls everyday to check in on her.      Dimension 4: 2 Client displays verbal compliance, but lacks consistent behaviors; has low motivation for change; and is passively involved in treatment.  Dim 4 Narrative:  \"I need recovery but my  and daughter is more worried.\"     Dimension 5: 3 Client has poor recognition and understanding of relapse and recidivism issues and displays moderately high vulnerability for further substance use or mental health problems. Client has few coping skills and rarely applies coping skills.  Dim 5 Narrative:   Recent relapse on . Lacks coping tools to cope with difficult experiences and symptoms without alcohol use.      Dimension 6: 3 Client is not engaged in structured, meaningful activity and the client's peers, family, significant other, and living environment are unsupportive, or there is significant criminal justice system involvement.  Dim 6 Narrative:  There is no legal involvement currently. Identifies , daughter and boss as supportive. Reports not feeling needed and cared for at home due to family hurting from grief. Describes herself as \"emotional\" and her daughter and  as \"rational.\" Reports to feeling more similar to her late son. Continuing to work on building her relationship with her daughter. 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     Initial Service Plan (ISP)     Immediate health, safety, and preliminary service needs identified and plan includes the following based on available information from clients, referral sources, and collateral information.     Safety (SI, SIB, suicide attempts, aggressive behaviors):  History of SI (suicidal ideation)?  No  History of SIB (self-injurious behavior)?  No  History of VI (violent ideation)?  No  History of HI (homicidal ideation)?  No     No additional safety plan required at this time       Name: Zaira Ahn  YOB: 1960  Date: 8/1/2023  My primary care provider: Dr. Brasher   [facility]  My prescriber: Dr. Brasher Other care team support:  Dr. Bosch for psychiatry.     My Triggers (check the ones that apply to you):   Relationship Conflict-With spouse at times  Work Difficulties- Works at a liquor store and feels overwhelmed sometimes  School Concerns- NA  Home Environment-  \"I don't always feel like people ask me what I need\"  Social Support- My manager at work.  Peer Relationships- Not many  Financial Concerns- None  Housing Concerns- None  Medical/Health- None  Substance Use-Ongoing use of alcohol.  Legal Difficulties- None         Additional People, Places, and Things that I can access for support:      Nothing else right now.       What is important to me and makes life worth living:   My family. I have 2 granddaughters, 12 and 14, and my .          GREEN     Good Control  1. I feel good  2. No suicidal thoughts   3. Can work, sleep and play        Action Steps  1. Self-care: balanced meals, exercising, sleep practices, etc.  2. Take your medications as prescribed.  3. Continue meetings with prescriber.  4.  Do the healthy things that I enjoy.  5. Other: NA                      YELLOW  Getting Worse  I have ANY of these:  1. I do not feel " "good  2. Difficulty Concentrating  3. Sleep is changing  4. Increase/Change in my thoughts to hurt self and/or others, but I can still manage and not act on it.   5. Not taking care of self.  6. Other:  NA                Action Steps (in addition to the above):  1. Inform your therapist and psychiatric prescriber/PCP.  2. Keep taking your medications as prescribed.    3. Turn to people you can ask for help.  4. Use internal coping strategies -see below.  5. Create safe environment:        -Store firearms for safety       -Lock and limit medications       -Notify friends/family of increase in symptoms  6. Other: NA                     RED  Get Help  If I have ANY of these:  1. Current and uncontrollable thoughts and/or behaviors to hurt self and/or others.   2. Other: NA    Actions to manage my safety  1.Contact your emergency person:   -Name:  -Number:  2. Call or Text [phone]  3. Call my crisis team-         -County:        -Number:  3. Or Call 911 or go to the emergency room right away  4.  Other: NA                    My Internal Coping Strategies include the following ([place] ones you use):  Take a bath  Blow bubbles  Belly breathing  Arts & Crafts  Coloring  Chew gum  Fidget toys: \"I am a fidgeter but no fidgets.\"   Safe space: \"me bedroom\"  Time with pets: \"I have a 4 year old basset hound named Rad and my son's cat, Brianne. Rad sleeps with me.\"  Coping toolbox  Temperature change  Exercise  Other: NA     Safety Concerns  How To Identify Situations That Make Your Mental Health Worse:  Triggers are things that make your mental health worse.  Look at the list below to help you find your triggers and what you can do about them.      1. Identify Early Warning Signs:     Sometimes symptoms return, even when people do their best to stay well. Symptoms can develop over a short period of time with little or no warning, but most of the time they emerge gradually over several weeks.  Early warning signs are changes " "that people experience when a relapse is starting. Some early warning signs are common and others are not as common.   Common Early Warning Signs:   Yes, Feeling tense or nervous: \"Yes but I don't know why. I think a lot of it might be my working situation making me nervous. I want to stay until I am 64 for my longterm but I am willing to make changes to working at the liquor store, maybe I can work at a coffee shop. I don't want to leave my boss because we are close.\"  Eating less or more  Trouble sleeping  Yes, Feeling depressed or low: \"Experience this almost everyday. My psychiatrist does not prescribe medications to me so I will need to see someone who will. I would like to get help managing my anxiety which can come on so suddenly. I have not heard about antabuse but I would like to get some help managing my cravings.\"   Yes, Feeling irritable: \"At work towards co-workers who don't work well but I hide it well. I feel alone at work because they can band against me sometimes.\"  Yes, Isolating: \"I get lost in my books and stay to myself.\"  Yes, Trouble concentrating: \"I get brain fog but I think it is due to COVID. Maybe alcoholism too.\"  Urges to harm self  Urges to harm others  Other: NA        2. Identify action steps to take when warning signs are noticed:     Taking Action- It is important to take action if you are experiencing early warning signs of a relapse.  The faster you act, the more likely it is that you can avoid a full relapse.  It is helpful to identify several specific ways to cope with symptoms.       The following is my list of symptoms and coping strategies that I can use when they are present:     Symptom Coping Strategies   Anxiety -Talk with someone in your support system and let him or her know how you are feeling.  -Use relaxation techniques such as deep breathing or imagery.  -Use positive affirmations to counteract negative self-talk such as  I am learning to let go of worry.  "   Depression - Schedule your day; include activities you must do and activities you enjoy doing.  - Get some exercise - walk, run, bike, or swim.  - Give yourself credit for even the smallest things you get done.   Sleep Difficulties    - Go to sleep at the same time every day.  - Do something relaxing before bed, such as drinking herbal tea or listening to music.  - Avoid having discussions about upsetting topics before going to bed.   Delusions    - Distract yourself from the disturbing thought by doing something that requires your attention such as a puzzle.  - Check out your beliefs by talking to someone you trust.    Hallucinations    - Use headphones to listen to music.  - Tell voices to  stop  or say to yourself,  I am safe.   - Ignore the hallucinations as much as possible; focus on other things.   Concentration Difficulties - Minimize distractions so there is only one thing for you to focus on at a time.    - Ask the person you are having a conversation with to slow down or repeat things you are unsure of.       Patient consented to co-developed safety plan.  Safety and risk management plan was completed.  Patient agreed to use safety plan should any safety concerns arise.  A copy was given to the patient.      Health:  Client does NOT have health issues that would impede participation in treatment     Transportation: Drive to treatment.                  Patient does not have any identified barriers to participating in referred services.     Vulnerable Adult Assessment     Does the patient possess a physical or mental infirmity or other physical, mental, or emotional dysfunction?  No. Patient is not a vulnerable adult.     This patient is not a functional Vulnerable Adult according to [place] Statute 626.5572 subdivision 21.       Treatment suggestions for client for the time period until the initial treatment planning session:  Urges and Cravings worksheet and familiarizing with basic self-care.      [place] Re-Assessment:      Have you ever wished you were dead or that you could go to sleep and not wake up? Lifetime?  No   Past Month?  No      Have you actually had any thoughts of killing yourself?  Lifetime?  No   Past Month?  No      Have you been thinking about how you might do this? Lifetime?  No   Past Month?  No      Have you had these thoughts and had some intention of acting on them?  Lifetime?  No   Past Month?  No      Have you started to work out the details of how to kill yourself?  Lifetime?  No   Past Month?  No      Do you intend to carry out this plan?   No      When you have the thoughts how long do they last?   N/A     Are there things - anyone or anything (ie Family, Baptism, pain of death) that stopped you from wanting to die or acting on thoughts of suicide?   Does not apply         2008  [employer]  Used with permission by Kelsey Adams, PhD.       Tammy Wade, [facility]       8/1/2023     2:30 PM

## 2023-08-02 NOTE — PROGRESS NOTES
[facility]  Adult Substance Use Disorder Program  Treatment Plan     These services are provided by the facility for each patient/client according to the individual's treatment plan:  Individual and group counseling  Education  Transition services  Services to address any co-occurring mental illness  Service coordination    Criteria for discharge:  Patients/clients are discharged from the program following completion of the entire program including all phases of treatment or acceptance of other post-treatment referrals such as sober supportive living, or aftercare at other facilities.  Patients/clients may also be discharged for inappropriate behavior or substance use.    Favorable Discharge - Patients/clients have completed agreed upon treatment goals, understand their diagnosis and appear motivated for continued recovery.  Guarded Discharge - Patients/clients have demonstrated some understanding of their diagnosis and recovery process, and have completed some of their treatment goals.  This prognosis also includes patients/clients who have completed some treatment goals but have not made commitment to community support or follow through with referrals.  Unfavorable Discharge - Patients/clients have not completed agreed upon treatment goals due to their own choice, have limited understanding of their diagnosis, and have shown minimal or inconsistent behavior conducive to recovery.  Those patients/clients discharged due to behavioral problems will also be unfavorable discharges.    Adult SARAY Treatment Plan                                Patient Name: Zaira Ahn  MRN: [id number]  : 1960  62 year old   Identified Gender: female  Admit Date: 23  Current Treatment Phase: Phase 1  Last Updated: Initial    SUBSTANCE USE DISORDER(S): 303.90 (F10.20) Alcohol Use Disorder Severe      Dimension 1: Acute Intoxication/Withdrawal Potential, Risk Level: 1    Needs identified from  "Comprehensive Assessment Summary:  Zaira reports that her last use of Alcohol was on 7/29/23. Experiencing withdrawal symptoms, none posing any safety threats at this time.   Update: Initial    Date of last use: 7/29/23  Patient currently receiving medication assisted therapy (MAT): No  Current or recent withdrawal symptoms: No and Yes anxiety, depression, chills, diarrhea, high blood pressure, and insomnia    Focus area: Client reports date of last use to be 7/29/23.  Date Assigned: 8/2/2023  Clinical Goal: Client to maintain abstinence throughout outpatient treatment.  Patient's Goal:  \"I want to use groups to learn more about my triggers, have some accountability and decrease isolation by talking to group members\"  Goal needs to be completed prior to discharge? [x]  Treatment Strategies:   Schedule appointment with medical provider to explore referral for medication-assisted therapy. and Advise counselor(s) of any changes in medications throughout the course of treatment.   2. Client report any substance/alcohol use to counselor to determine if any changes need to be made to address withdrawal symptoms.   Target Date: 11/2/23  Date Completed: Initial      Dimension 2: Biomedical Conditions/Complications, Risk Level: 0    Needs identified from Comprehensive Assessment Summary: Ct denies having pain at this time and reports her pain level is a 0 on the 0-10 Pain Rating Scale. Ct smokes nicotine products at this time and denies any chronic medical concerns. Has high blood pressure and takes medication as prescribed. Has not completed routine physical check ups.   Update: Initial    Focus area: Client will have physical health needs met and addressed  Clinical Goal: Address medical concerns as they arise and maintain management of physical health problems.   Patient's Goal:  \"I would like to schedule with my primary doctor after my vacation through [place].\"  Goal needs to be completed prior to discharge? " "[]  Methods/Strategies (must include amount and frequency):   1. Zaira will report any changes to physical health to counselor.   2. Zaira will attend all scheduled doctor's appointments.   3. Zaira will take medications as prescribed.   Target Date: 8/31/23  Completion Date: Initial    Focus area/need: Client reports current tobacco use. Zaira reports to smoking 4 cigarettes a day. \"I smoke a few puffs, put it down, then go back to it.\" Zaira reports that she has patches and gum that she has not used.   Clinical Goal: Client to receive information about smoking cessation.   Patient's Goal:  \"I want to replace smoking with more mindful habits and take breaks at work.\"  Goal needs to be completed prior to discharge? [x]  Methods/Strategies (must include amount and frequency):   1. Staff to provide client with nicotine cessation information and help on how to quit use.   2. Attend all doctor appointments as scheduled.   3. Identify therapeutic exercises to engage in to promote overall wellness and pain management.   Target Date: 11/2/23  Completion Date: Initial      Dimension 3: Emotional/Behavioral/Cognitive, Risk Level: 2    Needs identified from Comprehensive Assessment Summary: Client reports having mental health diagnosis of 296.32 (F33.1) Major Depressive Disorder, Recurrent Episode, Moderate _ and With anxious distress. Ct reports having the following MH symptoms at time of program intake: isolating / withdrawn, lack of energy, loss of interest / pleasure, low mood, trouble concentrating, irritability, agitation, depressed, hopeless, impaired thinking, impulsivity / disinhibition, anxiety, shaky/jittery , overwhelmed, and helplessness . Ct reports that she is not currently taking medications. Ct reports to not having the previous MH treatment  Update: Initial    Focus area: Improve mental health functioning.    Clinical Goal: Client gain coping skills to address mental health, report better " "understanding of relationship between mental health and substance use.   Patient's Goal:  \"I just don't want to feel so sad any more because it depletes me.\"  Goal needs to be completed prior to discharge? [x]  Methods/Strategies (must include amount and frequency):   1. Client to meet with a prescriber and take medications as prescribed, Report to counselor if you stop taking your medications.     2. Begin using coping skills learned in co-occurring group (i.e., grounding, relaxation, thought challenging, mindfulness, etc.) as well as skills previously learned and share in daily check-in any benefits or changes that you experience using these skills.   3: Assignment(s):  Practice box 4x4 breathing. Taking a yoga class with daughter once a week. Recognize experience of grief \"I notice that I did get angry this weekend for the first time since he .\"  Target Date: 23  Completion Date: Initial      Dimension 4: Readiness to Change, Risk Level: 2    Needs identified from Comprehensive Assessment Summary: Client displays verbal compliance and motivation but lacks consistent behaviors and follow-through. Ct has continued to use despite desire to quit.   Update: Initial    Focus area: Increase motivation for recovery.    Clinical Goal: Actively engage in the treatment process and successfully complete outpatient programming.     Patient's Goal:  \"I was to know more about how my mental health and substance use behaviors are related.\"  Goal needs to be completed prior to discharge? [x]  Methods/Strategies (must include amount and frequency):   1. Attend co-occurring group 3x weekly and contact staff in the event of any absences   2. Zaira will contact staff if unable to attend at [phone].  3. Client will identify weekly and daily goals to help increase motivation and engagement in recovery.   4. Continue to increase motivation   Target Date: 23   Completion Date: Initial      Dimension 5: " "Relapse/Continued Use/Continued Problem Potential, Risk Level: 3    Needs identified from Comprehensive Assessment Summary: Client have not been involved in past treatments, past detox admissions, and 12-Step support group participation. Ct reports having minimal sober time (last used 7/29) and has tried to quit drinking in the past but relapsed. Ct lacks insight into her personal relapse process along with early warning signs and triggers. Ct has not had a significant period of sobriety.   Update: Initial    Focus area: Client has little insight to triggers and has few coping skills that is used.    Clinical Goal: Develop coping skills and relapse prevention strategies to utilize when experiencing triggers, cravings and/or urges to drink.   Patient's Goal:  \"I want to take more time to talk to my daughter and  about what I need and take breaks at work.\"  Goal needs to be completed prior to discharge? [x]  Methods/Strategies (must include amount and frequency):   1. Zaira will share during each session's check-in any urges and addictive thinking to better understand their pattern of use and to prevent return to use.  2. Zaira talk to counselor identify 5 positive alternatives of things she can do when feeling lonely instead of drinking.   3. Assignment(s):  Root skills activities  Target Date: 11/2/23  Completion Date: NA      Dimension 6: Recovery Environment, Risk Level: 2    Needs identified from Comprehensive Assessment Summary: Client reported familial and occupational stressors at time of program intake. Ct reports her relationship status at time of intake was, . Ct reports that her current living situation is supportive towards her recovery. Ct reports that she has stable housing and has no concerns at this time. Ct reports that she lacks a daily structure and meaningful activities. 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  Update: Initial    Desire for family " "involvement: No    Patient would like family involved in treatment Not at this time.  Focus area: Client to make changes in her environment that will support recovery.   Clinical Goal: Build structured activities to support recovery.  Patient's Goal:  \"To do activities to support my sobriety and to reconnect with my .\"  Goal needs to be completed prior to discharge? [x]  Methods/Strategies (must include amount and frequency):   1. Client will take the dog for a walk everyday, in the morning and afternoon.  2. Take 15 mins walk around the block daily especially from my job and possibly with my .  3. Connect with my daughter about yoga class weekly.  4. Read more books.   Target Date: 11/2/23  Completion Date: NA      Resources  Resources to which the patient is being referred for problems when they are to be addressed concurrently by another provider: [facility]: [phone]    [x] Contact insurance to ensure referrals are in network    Vulnerable Adult Review   [x] Review of the facility Abuse Prevention plan was reviewed with the patient   [x] No individual abuse plan is necessary   [x] In addition to the facility Abuse Prevention plan, an Individual Abuse Plan will be put in place     Zaira attests their date of last use as 7/29/23. Zaira attests they have participated in the creation of this treatment plan. Zaira has been provided a copy of this treatment plan and is in agreement with how this plan is written and will be stored in the electronic record.       Patient Signature: _________________________________ Date: _________    Counselor Signature: ______________________________ Date: _________   "

## 2023-08-02 NOTE — ADDENDUM NOTE
Encounter addended by: Tammy Wade LPCC, LADC on: 8/2/2023 3:36 PM   Actions taken: Clinical Note Signed

## 2023-08-14 ENCOUNTER — TELEPHONE (OUTPATIENT)
Dept: BEHAVIORAL HEALTH | Facility: CLINIC | Age: 63
End: 2023-08-14
Payer: COMMERCIAL

## 2023-08-14 ENCOUNTER — BEH TREATMENT PLAN (OUTPATIENT)
Dept: BEHAVIORAL HEALTH | Facility: CLINIC | Age: 63
End: 2023-08-14
Payer: COMMERCIAL

## 2023-08-15 ENCOUNTER — TELEPHONE (OUTPATIENT)
Dept: BEHAVIORAL HEALTH | Facility: CLINIC | Age: 63
End: 2023-08-15
Payer: COMMERCIAL

## 2023-08-17 ENCOUNTER — TELEPHONE (OUTPATIENT)
Dept: BEHAVIORAL HEALTH | Facility: CLINIC | Age: 63
End: 2023-08-17
Payer: COMMERCIAL

## 2023-08-17 NOTE — TELEPHONE ENCOUNTER
----- Message from VANDANA Burgess sent at 8/16/2023  7:15 PM CDT -----  Regarding: Please cancell  Hello,    Please cancel all future appointments effective 8/17/23.  Client will not be attending programming at this time.    Thank you in advance,  Lucio Whitney MS, [facility] [place]

## 2023-08-30 NOTE — PROGRESS NOTES
Substance Use Disorder Discharge Summary for IOP Co-Occurring       Name:  Zaira Ahn   MRN:  [id number]   Admit Date: 23   Discharge Date: 8/15/23  Last Session Date: 23   :  1960   Hours of Treatment Completed: 3   Intake Diagnosis:  303.90 (F10.20) Alcohol Use Disorder Severe   Discharge Diagnosis:  303.90 (F10.20) Alcohol Use Disorder Severe   Discharge Address:    [address] [place] [id number]   Funding Source:    Insurance   Program:  [facility], Adult Intensive Outpatient Co-Occurring Program    Treatment Providers:   Dang Jones, MS, LADC, LPCC & Alexi Burrows, DD, LMFT, LADC, CGTP-[place]    Discharge Type:  APR- At Patient Request     Reason for service termination:  Zaira reports that group time conflicts with her employment. Zaira works at a liquor store. She opted for an evening program and was given a referral to [facility] in [place].        If program discharge status was At Staff Request, the license goodson must identify the following:    Other interested parties conferred with: not applicable    Referrals provided: Provided information for [facility] program.     Alternatives considered and attempted before deciding to discharge:  not applicable    Services Provided: Services included assessment, treatment and discharge planning, psychoeducation, recovery skills building, and individual and group therapy.  Client participated in an orientation session, treatment planning session, mental health diagnostic assessment session, individual sessions, and group sessions, where she was exposed to a variety of therapeutic techniques, including behavioral modification therapy, cognitive behavioral therapy, acceptance and commitment therapy, motivational enhancement therapy, dialectical behavioral therapy, mindfulness, 12-step facilitation, group feedback. Client was exposed to variety of topics and assignments, including but not limited to  communication, relationships, meditation, stress reduction, relapse prevention, sober support, coping skill development, spirituality, neurobiology of mental health and addiction, boundaries, boredom, self-care, cognitive distortions/cognitive restructuring, grief and loss, and self-esteem/self-worth.    DIMENSION/COURSE OF TREATMENT/INDIVIDUALIZED CARE:   1.  Withdrawal Potential - Intake Risk Ratin  Discharge Risk Ratin  Narrative supporting risk description: Upon admission, Zaira had ongoing alcohol use with no significant period of sobriety. Client endorse anxiety, chills and depression    Treatment plan goals and progress towards those goals:  Clinical goal for this dimension was: Client to maintain abstinence throughout outpatient treatment. .  Strategies to address the goal(s) for this dimension:   Schedule appointment with medical provider to explore referral for medication-assisted therapy. and Advise counselor(s) of any changes in medications throughout the course of treatment.   2. Client report any substance/alcohol use to counselor to determine if any changes need to be made to address withdrawal symptoms.     At time of discharge: Zaira did not complete the aforementioned treatment strategies for this dimension and did not participate in any group programming. Client denied having any signs or symptoms of post acute withdrawal (PAW'S). Client reports her date of last use for Alcohol was on 23. Client denies being on any MAT at time of discharge. The outcome of the goal for this dimension was ineffective.    The goal for this dimension was: Incomplete as of 2023.   2.  Biomedical Conditions and Complications - Intake Risk Ratin  Discharge Risk Ratin  Narrative supporting risk description: Upon admission, Zaira endorse some physical pain that was managed through medication. She identified goals to address tobacco use behaviors.     Treatment plan goals and progress towards  those goals:  Clinical goal for this dimension was: Address medical concerns as they arise and maintain management of physical health problems.  Strategies to address the goal(s) for this dimension:   1. Zaira will report any changes to physical health to counselor.   2. Zaira will attend all scheduled doctor's appointments.   3. Zaira will take medications as prescribed.     Goal: Client to receive information about smoking cessation.     1. Staff to provide client with nicotine cessation information and help on how to quit use.   2. Attend all doctor appointments as scheduled.   3. Identify therapeutic exercises to engage in to promote overall wellness and pain management.     At time of discharge: Zaira did not complete the aforementioned treatment strategies for this dimension.Client denied any medical issues and reported she was able to access medical care on her own. Client endorsed being in pain while in the program and at time of discharge. Client will follow-up with her PCP - Wei Brsaher as recommended.  Client reports taking the following medications at time of discharge:  Current Outpatient Medications   Medication    atenolol-chlorthalidone (TENORETIC) 50-25 MG tablet    atorvastatin (LIPITOR) 40 MG tablet    metFORMIN (GLUCOPHAGE-XR) 500 MG 24 hr tablet    nicotine (NICODERM CQ) 14 MG/24HR 24 hr patch    sertraline (ZOLOFT) 25 MG tablet     No current facility-administered medications for this visit.     The outcome of the goal for this dimension was ineffective.    The goal for this dimension was: Incomplete as of 2023.   3.  Emotional/Behavioral/Cognitive Conditions and Complications - Intake Risk Ratin  Discharge Risk Ratin  Narrative supporting risk description: Upon admission, Client identified ongoing difficulties managing symptoms of grief, anxiety and depression. Client identified experience of anxiety as a trigger for alcohol use. Client was tearful during  meetings.    Treatment plan goals and progress towards those goals:  Clinical goal for this dimension was:  Client will gain coping skills to address mental health, report better understanding of relationship between mental health and substance use.  .  Strategies to address the goal(s) for this dimension:   1. Client to meet with a prescriber and take medications as prescribed, Report to counselor if you stop taking your medications.     2. Begin using coping skills learned in co-occurring group (i.e., grounding, relaxation, thought challenging, mindfulness, etc.) as well as skills previously learned and share in daily check-in any benefits or changes that you experience using these skills.     At time of discharge: Zaira did not complete the aforementioned treatment strategies for this dimension due to lack of participate in groups and eventually deciding to quit. Client learned about how addiction and mental health impact each other. Client learned about the stages of grief and how grief has a significant impact on overall mental health and wellness. Client also learned about how grief impacts substance use disorders.  Client was encouraged to report any increase in symptoms to her community care team and /or go to the nearest Emergency Department and follow her safety plan if needed. Unable to assess any suicidal thought, plan or intent at time of discharge. The outcome of the goal for this dimension was ineffective.  Mental Status Exam at Discharge:  Physical Appearance/Attire:  Comment: Unable to assess due to client making request over the phone.   Hygiene:  Comment: Unable to assess   Eye Contact:  comment: Unable to assess   Speech:  Unable to assess   Speech Volume:  Unable to assess   Speech Quality: Unable to assess   Cognitive/Perceptual:  Unable to assess   Cognition:  Unable to assess   Judgment:  Unable to assess   Insight:  Unable to assess   Orientation:  Unable to assess   Thought:  Unable to  assess   Hallucinations:  Unable to assess   General Behavioral Tone:  Unable to assess   Psychomotor Activity:  Unable to assess   Gait:  Unable to assess   Mood:  Unable to assess   Affect:  Unable to assess     The goal for this dimension was: Incomplete as of 2023.   4.  Readiness for Change - Intake Risk Ratin  Discharge Risk Ratin  Narrative supporting risk description: Upon admission, client displayed verbal compliance and motivation but lacked consistent behaviors and follow-through. Ct continued to use despite desire to quit.      Treatment plan goals and progress towards those goals:  Clinical goal for this dimension was:  Actively engage in the treatment process and successfully complete outpatient programming.    .  Strategies to address the goal(s) for this dimension:   1. Attend co-occurring group 3x weekly and contact staff in the event of any absences   2. Zaira will contact staff if unable to attend at [phone].  3. Client will identify weekly and daily goals to help increase motivation and engagement in recovery.   4. Continue to increase motivation.    At time of discharge: Zaira did not complete the aforementioned treatment strategies for this dimension and ended programming prematurely. Client is currently in the Stage of Change 2-Contemplation stage within the [place] Stages of Change Model and is following all treatment recommendations.  At the time of discharge, Zaira remained contemplative about alcohol use behaviors.  The outcome of the goal for this dimension was ineffective.    The goal for this dimension was: Incomplete as of 2023.   5.  Relapse/Continued Use/Continued Problem Potential - Intake Risk Ratin  Discharge Risk Ratin  Narrative supporting risk description: Upon admission, Client was not involved in past treatments, past detox admissions, and 12-Step support group participation. Ct reported to having minimal sober time (last used ) and  has tried to quit drinking in the past but relapsed. Ct lacked insight into her personal relapse process along with early warning signs and triggers. Ct has not had a significant period of sobriety. .    Treatment plan goals and progress towards those goals:  Clinical goal for this dimension was:  Develop coping skills and relapse prevention strategies to utilize when experiencing triggers, cravings and/or urges to drink.  .  Strategies to address the goal(s) for this dimension:   1. Zaira will share during each session's check-in any urges and addictive thinking to better understand their pattern of use and to prevent return to use.  2. Zaira talk to counselor identify 5 positive alternatives of things she can do when feeling lonely instead of drinking    At time of discharge: Zaira did not complete the aforementioned treatment strategies for this dimension. Client demonstrated insight into how her use has impacted her physical and mental health, as well as how her use has negatively impacted relationships in her life.  Client demonstrated increased understanding of self-care and how it affects her recovery. The outcome of the goal for this dimension was ineffective.     The goal for this dimension was: Incomplete as of 2023.   6.  Recovery Environment - Intake Risk Ratin  Discharge Risk Ratin  Narrative supporting risk description: Upon admission, Client reported familial and occupational stressors at time of program intake. Ct reported her relationship status at time of intake was, . Ct reported that her current living situation is supportive towards her recovery. Ct reported that she has stable housing and has no concerns at this time. Ct reported that she lacks a daily structure and meaningful activities. Ct Pt reports she is employed full-time. Client works at a liquor store. .    Treatment plan goals and progress towards those goals:  Clinical goal for this dimension was:  Build  structured activities to support recovery. .  Strategies to address the goal(s) for this dimension:   1. Client will take the dog for a walk everyday, in the morning and afternoon.  2. Take 15 mins walk around the block daily especially from my job and possibly with my .  3. Connect with my daughter about yoga class weekly.  4. Read more books.     At time of discharge: Zaira did not complete the aforementioned treatment strategies for this dimension. Client has not attended support meetings while in treatment.  Client does not have a sponsor. Client is currently living in an environment conducive to recovery. At time of discharge, client endorses satisfaction working full time. 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     The goal for this dimension was: Incomplete as of 8/30/2023.   Strengths: Client was personable, hardworking, and endorsed insight into consequences of alcohol use.    Needs: Ongoing psychotherapy as needed to address ongoing MH symptoms, grief and sobriety. Need for higher level of care, Dual Disorders IOP DBT Program to address recovery.  Living arrangements at time of discharge: Client is living at home with her .     Program Involvement: poor  Attendance: poor  Ability to relate in group/   Other program activities: NA  Assignment Completion: NA  Overall Behavior: NA  Reported Family/Significant   Other Involvement: NA    Prognosis:  Guarded    PROGRAM CONTINUING CARE RECOMMENDATIONS:  1.  Complete referral for a dual diagnosis program, [facility].   2.  Abstain from the use of all mood-altering chemicals unless prescribed by a licensed medical provider.  3.  Attend weekly sober support group meetings and maintain contact with sober supportive individuals.   4. Follow all recommendations of your treatment/medical providers.    5. Remain law abiding   6. Remain medication compliant and consult with prescribing provider prior to  "making any medication adjustments.   7. Actively work with a sponsor and/or  which you can find through [employer] [facility] ([phone]).    Other recommendations:  Obtain and see a community-based psychotherapist. You can find a therapist by going to [email or web address] and click on \"find a therapist\" or by calling [facility] or [facility]. If you are interested in seeing a therapist through [facility] and have a PCP through [facility] you can call [phone] to schedule an appointment. Follow-up with PCP. etc.    Counselor Name and Title:    Tammy Wade, MPS, LSW, LADC, LPCC  MI/CD Psychotherapist  [facility] [address], [place] [address]  Phone: [phone] Fax:  [phone]     Date:  8/30/2023  Time:  4:52 PM   "

## 2023-08-30 NOTE — PROGRESS NOTES
[facility]  Adult Substance Use Disorder Program  Treatment Plan      These services are provided by the facility for each patient/client according to the individual's treatment plan:  Individual and group counseling  Education  Transition services  Services to address any co-occurring mental illness  Service coordination     Criteria for discharge:  Patients/clients are discharged from the program following completion of the entire program including all phases of treatment or acceptance of other post-treatment referrals such as sober supportive living, or aftercare at other facilities.  Patients/clients may also be discharged for inappropriate behavior or substance use.     Favorable Discharge - Patients/clients have completed agreed upon treatment goals, understand their diagnosis and appear motivated for continued recovery.  Guarded Discharge - Patients/clients have demonstrated some understanding of their diagnosis and recovery process, and have completed some of their treatment goals.  This prognosis also includes patients/clients who have completed some treatment goals but have not made commitment to community support or follow through with referrals.  Unfavorable Discharge - Patients/clients have not completed agreed upon treatment goals due to their own choice, have limited understanding of their diagnosis, and have shown minimal or inconsistent behavior conducive to recovery.  Those patients/clients discharged due to behavioral problems will also be unfavorable discharges.     Adult SARAY Treatment Plan                                 Patient Name: Zaira Ahn  MRN: [id number]  : 1960  62 year old   Identified Gender: female  Discharge Date: 8/15/23  Current Treatment Phase: Phase 1  Last Updated: Discharge     SUBSTANCE USE DISORDER(S): 303.90 (F10.20) Alcohol Use Disorder Severe        Dimension 1: Acute Intoxication/Withdrawal Potential, Risk Level: 1     Needs  "identified from Comprehensive Assessment Summary:  Zaira reports that her last use of Alcohol was on 7/29/23. Experiencing withdrawal symptoms, none posing any safety threats at this time.   Update: Initial     Date of last use: 7/29/23  Patient currently receiving medication assisted therapy (MAT): No  Current or recent withdrawal symptoms: No and Yes anxiety, depression, chills, diarrhea, high blood pressure, and insomnia     Focus area: Client reports date of last use to be 7/29/23.  Date Assigned: 8/2/2023  Clinical Goal: Client to maintain abstinence throughout outpatient treatment.  Patient's Goal:  \"I want to use groups to learn more about my triggers, have some accountability and decrease isolation by talking to group members\"  Goal needs to be completed prior to discharge? [x]  Treatment Strategies:   Schedule appointment with medical provider to explore referral for medication-assisted therapy. and Advise counselor(s) of any changes in medications throughout the course of treatment.   2. Client report any substance/alcohol use to counselor to determine if any changes need to be made to address withdrawal symptoms.   Target Date: 11/2/23  Date Completed: Initial        Dimension 2: Biomedical Conditions/Complications, Risk Level: 0     Needs identified from Comprehensive Assessment Summary: Ct denies having pain at this time and reports her pain level is a 0 on the 0-10 Pain Rating Scale. Ct smokes nicotine products at this time and denies any chronic medical concerns. Has high blood pressure and takes medication as prescribed. Has not completed routine physical check ups.   Update: Initial     Focus area: Client will have physical health needs met and addressed  Clinical Goal: Address medical concerns as they arise and maintain management of physical health problems.   Patient's Goal:  \"I would like to schedule with my primary doctor after my vacation through [employer].\"  Goal needs to be completed prior " "to discharge? []  Methods/Strategies (must include amount and frequency):   1. Zaira will report any changes to physical health to counselor.   2. Zaira will attend all scheduled doctor's appointments.   3. Zaira will take medications as prescribed.   Target Date: 8/31/23  Completion Date: Initial     Focus area/need: Client reports current tobacco use. Zaira reports to smoking 4 cigarettes a day. \"I smoke a few puffs, put it down, then go back to it.\" Zaira reports that she has patches and gum that she has not used.   Clinical Goal: Client to receive information about smoking cessation.   Patient's Goal:  \"I want to replace smoking with more mindful habits and take breaks at work.\"  Goal needs to be completed prior to discharge? [x]  Methods/Strategies (must include amount and frequency):   1. Staff to provide client with nicotine cessation information and help on how to quit use.   2. Attend all doctor appointments as scheduled.   3. Identify therapeutic exercises to engage in to promote overall wellness and pain management.   Target Date: 11/2/23  Completion Date: Initial        Dimension 3: Emotional/Behavioral/Cognitive, Risk Level: 2     Needs identified from Comprehensive Assessment Summary: Client reports having mental health diagnosis of 296.32 (F33.1) Major Depressive Disorder, Recurrent Episode, Moderate _ and With anxious distress. Ct reports having the following MH symptoms at time of program intake: isolating / withdrawn, lack of energy, loss of interest / pleasure, low mood, trouble concentrating, irritability, agitation, depressed, hopeless, impaired thinking, impulsivity / disinhibition, anxiety, shaky/jittery , overwhelmed, and helplessness . Ct reports that she is not currently taking medications. Ct reports to not having the previous MH treatment  Update: Initial     Focus area: Improve mental health functioning.    Clinical Goal: Client gain coping skills to address mental health, " "report better understanding of relationship between mental health and substance use.   Patient's Goal:  \"I just don't want to feel so sad any more because it depletes me.\"  Goal needs to be completed prior to discharge? [x]  Methods/Strategies (must include amount and frequency):   1. Client to meet with a prescriber and take medications as prescribed, Report to counselor if you stop taking your medications.     2. Begin using coping skills learned in co-occurring group (i.e., grounding, relaxation, thought challenging, mindfulness, etc.) as well as skills previously learned and share in daily check-in any benefits or changes that you experience using these skills.   3: Assignment(s):  Practice box 4x4 breathing. Taking a yoga class with daughter once a week. Recognize experience of grief \"I notice that I did get angry this weekend for the first time since he .\"  Target Date: 23  Completion Date: Initial        Dimension 4: Readiness to Change, Risk Level: 2     Needs identified from Comprehensive Assessment Summary: Client displays verbal compliance and motivation but lacks consistent behaviors and follow-through. Ct has continued to use despite desire to quit.   Update: Initial     Focus area: Increase motivation for recovery.    Clinical Goal: Actively engage in the treatment process and successfully complete outpatient programming.     Patient's Goal:  \"I was to know more about how my mental health and substance use behaviors are related.\"  Goal needs to be completed prior to discharge? [x]  Methods/Strategies (must include amount and frequency):   1. Attend co-occurring group 3x weekly and contact staff in the event of any absences   2. Zaira will contact staff if unable to attend at [phone].  3. Client will identify weekly and daily goals to help increase motivation and engagement in recovery.   4. Continue to increase motivation   Target Date: 23   Completion Date: Initial      " "  Dimension 5: Relapse/Continued Use/Continued Problem Potential, Risk Level: 3     Needs identified from Comprehensive Assessment Summary: Client have not been involved in past treatments, past detox admissions, and 12-Step support group participation. Ct reports having minimal sober time (last used 7/29) and has tried to quit drinking in the past but relapsed. Ct lacks insight into her personal relapse process along with early warning signs and triggers. Ct has not had a significant period of sobriety.   Update: Initial     Focus area: Client has little insight to triggers and has few coping skills that is used.    Clinical Goal: Develop coping skills and relapse prevention strategies to utilize when experiencing triggers, cravings and/or urges to drink.   Patient's Goal:  \"I want to take more time to talk to my daughter and  about what I need and take breaks at work.\"  Goal needs to be completed prior to discharge? [x]  Methods/Strategies (must include amount and frequency):   1. Zaira will share during each session's check-in any urges and addictive thinking to better understand their pattern of use and to prevent return to use.  2. Zaira talk to counselor identify 5 positive alternatives of things she can do when feeling lonely instead of drinking.   3. Assignment(s):  Root skills activities  Target Date: 11/2/23  Completion Date: NA        Dimension 6: Recovery Environment, Risk Level: 2     Needs identified from Comprehensive Assessment Summary: Client reported familial and occupational stressors at time of program intake. Ct reports her relationship status at time of intake was, . Ct reports that her current living situation is supportive towards her recovery. Ct reports that she has stable housing and has no concerns at this time. Ct reports that she lacks a daily structure and meaningful activities. 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  Update: Initial   " "  Desire for family involvement: No     Patient would like family involved in treatment Not at this time.  Focus area: Client to make changes in her environment that will support recovery.   Clinical Goal: Build structured activities to support recovery.  Patient's Goal:  \"To do activities to support my sobriety and to reconnect with my .\"  Goal needs to be completed prior to discharge? [x]  Methods/Strategies (must include amount and frequency):   1. Client will take the dog for a walk everyday, in the morning and afternoon.  2. Take 15 mins walk around the block daily especially from my job and possibly with my .  3. Connect with my daughter about yoga class weekly.  4. Read more books.   Target Date: 11/2/23  Completion Date: NA        Resources  Resources to which the patient is being referred for problems when they are to be addressed concurrently by another provider: [facility]: [phone]     [x] Contact insurance to ensure referrals are in network     Vulnerable Adult Review   [x] Review of the facility Abuse Prevention plan was reviewed with the patient   [x] No individual abuse plan is necessary   [x] In addition to the facility Abuse Prevention plan, an Individual Abuse Plan will be put in place      Zaira attests their date of last use as 7/29/23. Zaira attests they have participated in the creation of this treatment plan. Treatment Plan has not been updated due to lack of participation in programming and short time frame during participation in individual sessions.      Patient Signature: _________________________________           Date: _________     Counselor Signature: ______________________________            Date: _________   "

## 2023-09-01 ENCOUNTER — TELEPHONE (OUTPATIENT)
Dept: PSYCHIATRY | Facility: CLINIC | Age: 63
End: 2023-09-01
Payer: COMMERCIAL

## 2023-09-01 NOTE — TELEPHONE ENCOUNTER
Called patient from email from provider. The patient is supposed to go to recovery services Mon, Tue, Thur afternoons from 1-4 pm. She said that she works those days and she was trying to find an evening program but was unable to find one. She has decided that she will go to the afternoon program and they will have to have someone else cover her shifts those days. She said her and her family are saying she has to take care of herself first and let some other things go for right now.   She does want a follow up call if possible. [phone].     Belinda Gaxiola RN on 9/1/2023 at 2:14 PM

## 2023-09-05 NOTE — TELEPHONE ENCOUNTER
Call to patient which went to voice mail.  Left message supporting her decision for the Intensive Outpnt Programming.  Instructed to call nursing again if she would like to talk further or needed anything from me.      Sherrie Chu, DNP, APRN, FMHNP-BC,

## 2023-09-11 ENCOUNTER — TELEPHONE (OUTPATIENT)
Dept: PSYCHIATRY | Facility: CLINIC | Age: 63
End: 2023-09-11

## 2023-09-11 NOTE — TELEPHONE ENCOUNTER
Reason for call:  Other   Patient called regarding (reason for call): returning call  Additional comments: Patient responding to message from Sherrie DAVISON dated 9/1. Patient wants to make sure it's ok for her to start group this afternoon.    Phone number to reach patient:  Home number on file [phone] (home)    Best Time:  ASAP    Can we leave a detailed message on this number?  YES    Travel screening: Not Applicable

## 2023-09-11 NOTE — TELEPHONE ENCOUNTER
Received call from patient that she is ready to start treatment. RN reviewed 9/1/23 provider note:    September 5, 2023  Sherrie Chu DNP     RP    9/5/23  4:54 PM  Note  Call to patient which went to voice mail.  Left message supporting her decision for the Intensive Outpnt Programming.  Instructed to call nursing again if she would like to talk further or needed anything from me.       Sherrie Chu DNP, APRN, [facility],         RN called patient back and she states the following:    She tried to find an evening OP group but would have had to be on a wait list and she declined as she needed to start treatment right away.  She hired a new staff member last week to cover her role at work so she can now enter treatment at the 1-4 time frame originally scheduled.   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  She can start a program immediately.    RN reviewed the chart. It appears treatment that was in place was cancelled. Although patient completed intake paperwork weeks ago, she may need new orders.    RN instructed patient to call the A team at [phone] and see if she is able to reschedule. RN let patient know care team would be updated. She is adamant about starting treatment right away.     Routing to provider for review and Lucio Whitney LADC, and [facility] teaam for review and recommendation.     Jaimie Marks RN on 9/11/2023 at 2:13 PM

## 2023-09-12 NOTE — TELEPHONE ENCOUNTER
Just let me know if I need to put in a new order.  Thank you!    Sherrie Chu, SANTA, APRN, FMHNP-BC,

## 2023-09-14 NOTE — TELEPHONE ENCOUNTER
----- Message from Sussy Walker sent at 9/14/2023  7:18 AM CDT -----  Regarding: Returning client  Good Morning! We have a client who wants to come back to our IOP in [place], do we need to do anything regarding her insurance before getting her started? We technically do not need a new eval so Im just checking on the insurance/referral piece to make sure we do not miss anything     Let me know if this request should go to the [facility] inbox instead    Thank you

## 2023-09-14 NOTE — TELEPHONE ENCOUNTER
----- Message from Sussy Walker sent at 9/14/2023  3:33 PM CDT -----  Regarding: SERVICE INITIATION  SERVICE INITIATION SCHEDULED ON 9/20, returning client

## 2023-09-20 ENCOUNTER — HOSPITAL ENCOUNTER (OUTPATIENT)
Dept: BEHAVIORAL HEALTH | Facility: CLINIC | Age: 63
Discharge: HOME | End: 2023-09-20
Attending: FAMILY MEDICINE
Payer: COMMERCIAL

## 2023-09-20 ENCOUNTER — TELEPHONE (OUTPATIENT)
Dept: BEHAVIORAL HEALTH | Facility: CLINIC | Age: 63
End: 2023-09-20
Payer: COMMERCIAL

## 2023-09-20 PROCEDURE — H2035 A/D TX PROGRAM, PER HOUR: HCPCS | Performed by: COUNSELOR

## 2023-09-20 ASSESSMENT — ANXIETY QUESTIONNAIRES
5. BEING SO RESTLESS THAT IT IS HARD TO SIT STILL: NOT AT ALL
GAD7 TOTAL SCORE: 7
2. NOT BEING ABLE TO STOP OR CONTROL WORRYING: SEVERAL DAYS
7. FEELING AFRAID AS IF SOMETHING AWFUL MIGHT HAPPEN: NOT AT ALL
3. WORRYING TOO MUCH ABOUT DIFFERENT THINGS: NEARLY EVERY DAY
4. TROUBLE RELAXING: NOT AT ALL
6. BECOMING EASILY ANNOYED OR IRRITABLE: SEVERAL DAYS
2. FELT ANXIOUS, WORRIED, OR NERVOUS: MORE THAN HALF THE DAYS

## 2023-09-20 ASSESSMENT — PATIENT HEALTH QUESTIONNAIRE - PHQ9: SUM OF ALL RESPONSES TO PHQ QUESTIONS 1-9: 6

## 2023-09-20 NOTE — PROGRESS NOTES
[facility]  Adult Substance Use Disorder Program  Treatment Plan      These services are provided by the facility for each patient/client according to the individual's treatment plan:  Individual and group counseling  Education  Transition services  Services to address any co-occurring mental illness  Service coordination     Criteria for discharge:  Patients/clients are discharged from the program following completion of the entire program including all phases of treatment or acceptance of other post-treatment referrals such as sober supportive living, or aftercare at other facilities.  Patients/clients may also be discharged for inappropriate behavior or substance use.     Favorable Discharge - Patients/clients have completed agreed upon treatment goals, understand their diagnosis and appear motivated for continued recovery.  Guarded Discharge - Patients/clients have demonstrated some understanding of their diagnosis and recovery process and have completed some of their treatment goals.  This prognosis also includes patients/clients who have completed some treatment goals but have not made commitment to community support or follow through with referrals.  Unfavorable Discharge - Patients/clients have not completed agreed upon treatment goals due to their own choice, have limited understanding of their diagnosis, and have shown minimal or inconsistent behavior conducive to recovery.  Those patients/clients discharged due to behavioral problems will also be unfavorable discharges.     Adult SARAY Treatment Plan                                 Patient Name: Zaira Ahn  MRN: [id number]  : 1960  62 year old   Identified Gender: female  Admit Date: 23  Current Treatment Phase: Phase 1  Last Updated: Initial     SUBSTANCE USE DISORDER(S): 303.90 (F10.20) Alcohol Use Disorder Severe        Dimension 1: Acute Intoxication/Withdrawal Potential, Risk Level: 1     Needs identified  "from Comprehensive Assessment Summary:  Zaira reports that her last use of Alcohol was on 9/12/23. No withdrawal symptoms reported.  Update: Initial  Date of last use: 9/12/23  Patient currently receiving medication assisted therapy (MAT): No  Current or recent withdrawal symptoms: No   Focus area: Client reports date of last use to be 9/12/23.  Date Assigned: 9/20/2023    Clinical Goal: Learn how to apply self-care and psychotherapy to build a repertoire of daily habits that counteract PAWS, fatigue, depression and anxiety leading to increased resiliency and well-being.  Patient's Goal:  \"I want to use groups to learn more about my triggers, have some accountability and decrease isolation by talking to group members\"  Goal needs to be completed prior to discharge? [x]  Methods/Strategies (must include amount and frequency):   Schedule appointment with medical provider to explore referral for medication-assisted therapy. and Advise counselor(s) of any changes in medications throughout the course of treatment.   2. Client report any substance/alcohol use to counselor to determine if any changes need to be made to address withdrawal symptoms.   3. Attend each of the following groups one time a week and complete all 6 by due date, unless excused by primary counselor. All group work to be completed each session to receive an effective outcome.  > Neurobiology of addiction:  Informs client of brain changes and reduces feelings of shame, instructs on steps to help heal    > Learn and apply Self-Care in a variety of areas to improve mental and physical health, reduce PAWS, and increase feelings of self-empowerment, resiliency and well-being.    > Learn Stress Management techniques to reduce PAWS and stress-related symptoms, increase resiliency, and learn healthy routines to replace dysfunctional habits.   Target Date: 11/1/23  Completion Date:      Dimension 2: Biomedical Conditions/Complications, Risk Level: 0     Needs " "identified from Comprehensive Assessment Summary: Ct denies having pain at this time and reports her pain level is a 0 on the 0-10 Pain Rating Scale. Ct smokes nicotine products at this time and denies any chronic medical concerns. Has high blood pressure and takes medication as prescribed. Has not completed routine physical checkups.   Update: Initial     Focus area: Client will have physical health needs met and addressed  Clinical Goal: Address medical concerns as they arise and maintain management of physical health problems.   Patient's Goal:  \"I would like to schedule with my primary doctor [facility]  Goal needs to be completed prior to discharge? []  Methods/Strategies (must include amount and frequency):   1. Zaira will report any changes to physical health to counselor.   2. Zaira will attend all scheduled doctor's appointments.   3. Zaira will take medications as prescribed.   Target Date: 10/1/23  Completion Date: Initial     Focus area/need: Client reports current tobacco use. Zaira reports to smoking 3 cigarettes a day. \"I smoke a few puffs, put it down, then go back to it.\" Zaira reports that she has patches and gum that she has not used.   Clinical Goal: Client to receive information about smoking cessation.   Patient's Goal:  \"I want to replace smoking with more mindful habits and take breaks at work.\"  Goal needs to be completed prior to discharge? [x]  Methods/Strategies (must include amount and frequency):   1. Staff to provide client with nicotine cessation information and help on how to quit use.   2. Attend all doctor appointments as scheduled.   3. Identify therapeutic exercises to engage in to promote overall wellness and pain management.   4. Increase information on smoking cessation.   Target Date: 11/2/23  Completion Date: Initial        Dimension 3: Emotional/Behavioral/Cognitive, Risk Level: 2     Needs identified from Comprehensive Assessment Summary: " "Client reports having mental health diagnosis of 296.32 (F33.1) Major Depressive Disorder, Recurrent Episode, Moderate _ and With anxious distress. Ct reports having the following MH symptoms at time of program intake: isolating / withdrawn, lack of energy, loss of interest / pleasure, low mood, trouble concentrating, irritability, agitation, depressed, hopeless, impaired thinking, impulsivity / disinhibition, anxiety, shaky/jittery, overwhelmed, and helplessness . Ct reports that she is not currently taking medications. Ct reports to not having the previous MH treatment  Update: Initial     Focus area: Improve mental health functioning.    Clinical Goal: Client gain coping skills to address mental health, report better understanding of relationship between mental health and substance use.   Patient's Goal:  \"I just don't want to feel so sad anymore because it depletes me.\"  Goal needs to be completed prior to discharge? [x]  Methods/Strategies (must include amount and frequency):   1. Client to meet with a prescriber and take medications as prescribed, Report to counselor if you stop taking your medications.     2. Begin using coping skills learned in co-occurring group (i.e., grounding, relaxation, thought challenging, mindfulness, etc.) as well as skills previously learned and share in daily check-in any benefits or changes that you experience using these skills.   3: Assignment(s):  Practice box 4x4 breathing.  Recognize experience of grief and learning about the stages of grief.   4.  Learn and use Mindfulness to facilitate effective action in problem solving and promote health and well-being     > Mental Health Part 1: Learn 3 core processes of Acceptance, Cognitive Defusion, and Being Present   > Mental Health Part 2: Learn 3 core processes of Self-as-Context, Values, and Committed Action   Target Date: 11/2/23  Completion Date: Initial        Dimension 4: Readiness to Change, Risk Level: 2     Needs " "identified from Comprehensive Assessment Summary: Client displays verbal compliance and motivation but lacks consistent behaviors and follow-through. Ct has continued to use despite desire to quit.   Update: Initial     Focus area: Increase motivation for recovery.    Clinical Goal: Actively engage in the treatment process and successfully complete outpatient programming.     Patient's Goal:  \"I was to know more about how my mental health and substance use behaviors are related.\"  Goal needs to be completed prior to discharge? [x]  Methods/Strategies (must include amount and frequency):   1. Attend co-occurring group 3x weekly and contact staff in the event of any absences   2. Zaira will contact staff if unable to attend at [phone].  3. Client will identify weekly and daily goals to help increase motivation and engagement in recovery.   4. Continue to increase motivation by identifying consequences of use and benefits of sobriety.   Target Date: 11/2/23   Completion Date: Initial       Dimension 5: Relapse/Continued Use/Continued Problem Potential, Risk Level: 3     Needs identified from Comprehensive Assessment Summary: Client have not been involved in past treatments, past detox admissions, and 12-Step support group participation. Ct reports having minimal sober time (last used 9/12) and has tried to quit drinking in the past but relapsed. Ct lacks insight into her personal relapse process along with early warning signs and triggers. Ct has not had a significant period of sobriety.   Update: Initial     Focus area: Client has little insight to triggers and has few coping skills that is used.    Clinical Goal: Develop coping skills and relapse prevention strategies to utilize when experiencing triggers, cravings and/or urges to drink.   Patient's Goal:  \"I want to take more time to talk to my daughter and  about what I need and take breaks at work.\"  Goal needs to be completed prior to discharge? " "[x]  Methods/Strategies (must include amount and frequency):   1. Zaira will share during each session's check-in any urges and addictive thinking to better understand their pattern of use and to prevent return to use.  2. Zaira talk to counselor identify 5 positive alternatives of things she can do when feeling lonely instead of drinking.   3. Assignment(s):  Root skills activities  Target Date: 11/2/23  Completion Date: NA        Dimension 6: Recovery Environment, Risk Level: 2     Needs identified from Comprehensive Assessment Summary: Client reported familial and occupational stressors at time of program intake. Ct reports her relationship status at time of intake was, . Ct reports that her current living situation is supportive towards her recovery. Ct reports that she has stable housing and has no concerns at this time. Ct reports that she lacks a daily structure and meaningful activities. 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  Update: Initial     Desire for family involvement: No     Patient would like family involved in treatment: Not at this time.  Focus area: Client to make changes in her environment that will support recovery.   Clinical Goal: Build structured activities to support recovery.  Patient's Goal:  \"To do activities to support my sobriety and to reconnect with my .\"  Goal needs to be completed prior to discharge? [x]  Methods/Strategies (must include amount and frequency):   1. Client will take the dog for a walk every day, in the morning and afternoon.  2. Take 15 mins walk around the block daily especially from my job and possibly with my .  3. Connect with my daughter about yoga class weekly.  4. Read more books and knit.   Target Date: 11/2/23  Completion Date: NA        Resources  Resources to which the patient is being referred for problems when they are to be addressed concurrently by another provider: [facility]: " [phone]     [x] Contact insurance to ensure referrals are in network     Vulnerable Adult Review   [x] Review of the facility Abuse Prevention plan was reviewed with the patient   [x] No individual abuse plan is necessary   [x] In addition to the facility Abuse Prevention plan, an Individual Abuse Plan will be put in place      Zaira attests their date of last use as 9/12/23. Zaira attests they have participated in the creation of this treatment plan. Zaira has been provided a copy of this treatment plan and is in agreement with how this plan is written and will be stored in the electronic record.         Patient Signature: _________________________________           Date: _________     Counselor Signature: ______________________________            Date: _________

## 2023-09-20 NOTE — PROGRESS NOTES
Comprehensive Assessment Summary      Based on client interview, review of previous assessments and   comprehensive assessment interview the following diagnosis and recommendations are:      Client: Zaira Ahn  MRN; [id number]   : 1960  Age: 62 year old Sex: female        Client meets criteria for:  303.90 (F10.20) Alcohol Use Disorder Severe     Dimension One: Acute Intoxication/Withdrawal Potential:  Risk Ratin.     Ct reports that her last use of Alcohol was on 23. Client displays no withdrawals symptoms at this time.      Dimension Two: Biomedical Condition and Complications:  Risk Ratin.     Client denies having any chronic biomedical conditions that would interfere with treatment or any recovery skills training/workshop. Ct does endorse having the following medical conditions; high blood pressure. Ct reports taking the following medications at this time;       Current Outpatient Medications   Medication    atenolol-chlorthalidone (TENORETIC) 50-25 MG tablet    atorvastatin (LIPITOR) 40 MG tablet    metFORMIN (GLUCOPHAGE-XR) 500 MG 24 hr tablet    nicotine (NICODERM CQ) 14 MG/24HR 24 hr patch    sertraline (ZOLOFT) 25 MG tablet      No current facility-administered medications for this encounter.      Ct denies having pain at this time and reports her pain level is a 0 on the 0-10 Pain Rating Scale. Ct reports consumption of nicotine products at this time and a desire to reduce use.      Dimension Three: Emotional/Behavioral/Cognitive Conditions & Complications:  Risk Ratin.   Client reports having mental health diagnosis of 296.32 (F33.1) Major Depressive Disorder, Recurrent Episode, Moderate _ and With anxious distress. Ct reports having the following MH symptoms at time of program intake: isolating / withdrawn, lack of energy, loss of interest / pleasure, low mood, trouble concentrating, irritability, agitation, depressed, hopeless, impaired thinking, impulsivity /  "disinhibition, anxiety, shaky/jittery , overwhelmed, and helplessness . Ct reports that she is not currently taking medications. Ct reports to not having the previous MH treatment. At time of intake client denies having community MH providers. Ct lacks sober coping skills and impulse control. Ct lacks emotional and stress management skills. At time of intake, Ct reports having the following risk factors, substance abuse. At time of intake, Ct reports having the following protective factors, Supportive friends and/or family, Future-oriented, , Is a parent, Stable housing, and Access to mental health resources. At time of intake, Ct reports her social functioning/environment as the following good support network, tendency to isolate from others, no history of legal charges, and currently employed. Ct denies having any SIB's/SI's/HI's at this time.      Dimension Four: Treatment Acceptance/Resistance:  Risk Ratin.     Client displays verbal compliance and motivation but lacks consistent behaviors and follow-through. Ct has continued to use despite desire to quit. Ct appears to be in the \"2\" Stage within the Stages of Change Model.      Dimension Five: Relapse/Continued Problem Potential:  Risk Rating: 3.           Client have not been involved in past treatments, past detox admissions, and 12-Step support group participation. Ct reports having minimal sober time (last used ) and has tried to quit drinking in the past but relapsed. Ct lacks insight into her personal relapse process along with early warning signs and triggers. Ct lacks impulse control along with sober coping skills. Ct lacks insight into the effects her use has had on her physical and mental health. Ct lacks appropriate recovery skills, and is at a moderate to high risk for relapse/continued use.     Dimension Six: Recovery Environment:  Risk Rating: 3.       Client reported familial and occupational stressors at time of program intake. Ct " reports her relationship status at time of intake was, . Ct reports that her current living situation is supportive towards her recovery. Ct reports that she has stable housing and has no concerns at this time. Ct reports that she lacks a daily structure and meaningful activities. 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 Pt reports graduating high school.. Ct reports fracturing relationships with family and friends due to her use. Ct lacks a sober support network. Ct reports that she has no legal involvement.     I have reviewed the information on the assessment, medical history and checklist. Any pertinent updated information is included above, which was obtained from the client before attending the first day in group.     Tammy Wade, MPS, LSW, LADC, LPCC  MI/CD Psychotherapist  [facility]   [facility] [address], [place] [address]  Phone: [phone] Fax:  [phone]

## 2023-09-20 NOTE — TELEPHONE ENCOUNTER
----- Message from ERIN Vargas, VANDANA sent at 2023 12:59 PM CDT -----  Regarding: Please schedule this client for Group Program [id number], [place] [facility] Co-Occurring Afternoon  Hello,     Location of programmin [address], [place] [address]  Start Date:23  Group: [id number] on #,  and # at #1pm-4pm#  Provider: RUSLAN Vargas, VANDANA, ERIN, Chanelle Taylor MD is   Number of visits to be scheduled: 20 sessions  Length/Duration of Appointment in minutes: 3 hrs  Visit Type: In person  Additional notes:    Tammy Turk

## 2023-09-20 NOTE — PROGRESS NOTES
"Client:  Zaira Ahn  MRN: [id number]     8/1/2023  START TIME: 11:00 AM   END TIME: 12:10 PM   Duration:  1 Hour     Visit Purpose:     The patient met with this writer, Tammy Wade, for both an individual session and service initiation.      We discussed the impact of their substance use on the different areas of their life, and the patient identified which areas are a priority for them.  The patient shared what they have been doing between the initial comprehensive assessment date and today's appointment.  This writer suggested several coping skills that the patient can begin using immediately.  The patient also shared their individual motivation for recovery during this session.       Any assessment updates are noted below.       Comprehensive Assessment UPDATE/ISP/VAA/[place] Re-Assess      Comprehensive Assessment Update: 9/20/2023     Comprehensive assessment dated 7/26/23 was reviewed and updates are as follows:      Reason for visit today: Individual therapy session and [facility] service initiation.     Dates of last use and substance(s) used:  Last use alcohol on 9/12/23     Patient does not have a history of opiate use.     Safety concerns:  No safety concerns reported.       Other:  NA     Health Screening:  Given patient's past history, a medication, and physical condition, is there a fall risk?  No  Does the patient have any pain? No  Is the patient on a special diet? If yes, please explain: no  Does the patient have any concerns regarding your nutritional status? If yes, please explain: no \"Not really, I really don't eat. I had COVID and lost my smell and taste senses and food is not currently tasting good for me. I don't enjoy eating right now. I do ensure shakes and drink a lot of water with electrolytes.\"   Has the patient had any appetite changes in the last 3 months?  No  Has the patient had any weight loss or " "weight gain in the last 3 months? No  Has the patient have a history of an eating disorder or been over-eating, avoiding meals, or inducing vomiting?  Yes, I had Bulimia in my 20's which is now stable.  Does the patient have any dental concerns? (Problems with teeth, pain, cavities, braces)?  YES \"I still have a broken tooth from an old filling. I have a huge dental phobia. I usually need to be put under to get work done. The fingers in my mouth freaks me out. I don't want to be judged either.\" Last saw the dentist 5 years ago, current tooth does not ache. One of the filling fell out, \"I used Argenis whiskey on a cotton ball and killed the nerve over time.\"  Are immunizations up to date?  Yes  Any recent exposure to TB, Hepatitis, Measles, or Strep?  No     Brief Biosocial Gambling Screening:  Do you have any current or past concerns regarding gambling?  no  If YES:  Have you ever participated in a gambling treatment program?  no  During the past 12 months, have you become restless, irritable or anxious when trying to stop/cut down on gambling?  no  During the past 12 months, have you tried to keep your family or friends from knowing how much you gambled?  no  During the past 12 months, did you have such financial trouble, as a result of your gambling, that you had to get help with living expenses from family, friends, or welfare?  no        Dimension Scale Ratings:     Dimension 1: 1 Client can tolerate and cope with withdrawal discomfort. The client displays mild to moderate intoxication or signs and symptoms interfering with daily functioning but does not immediately endanger self or others. Client poses minimal risk of severe withdrawal.  Dim 1 Narrative:  Last used a pint of vodka on 9/12/23. Denies experience of any withdrawals symptoms.      Dimension 2: 0 Client displays full functioning with good ability to cope with physical discomfort.  Dim 2 Narrative:  High blood pressure and takes blood pressure " "medications as prescribed. Not a current barrier to programming.      Dimension 3: 2 Client has difficulty with impulse control and lacks coping skills. Client has thoughts of suicide or harm to others without means; however, the thoughts may interfere with participation in some treatment activities. Client has difficulty functioning in significant life areas. Client has moderate symptoms of emotional, behavioral, or cognitive problems. Client is able to participate in most treatment activities.  Dim 3 Narrative: Reports experience of ongoing grief and loss. Her son  two years ago. Identifies reading as the only coping skills used. Identifies daughter as a good accountability figure and that she calls everyday to check in on her.   Update: Reports improvement in depressive symptoms. Identifies reading, knitting and organizing all of her son's items as helpful. She reports being more ready to let go of something relating to her son. Denies any thoughts of self-harm.      Dimension 4: 2 Client displays verbal compliance, but lacks consistent behaviors; has low motivation for change; and is passively involved in treatment.  Dim 4 Narrative:  \"I need recovery but my  and daughter is more worried.\"  Update: \"I am a 10 in my motivation to get (my recovery) started but I am anxious about it. I think it's the newness, I've never done groups before.\" Zaira has made adjustments to her work to participate in programming.      Dimension 5: 3 Client has poor recognition and understanding of relapse and recidivism issues and displays moderately high vulnerability for further substance use or mental health problems. Client has few coping skills and rarely applies coping skills.  Dim 5 Narrative:   Recent relapse on . Lacks consistent use of coping tools to cope with difficult experiences and symptoms without alcohol use. Identifies reading, have a new reading corner, knitting and having more consistent dinner with " "her  every night to talk as helpful.      Dimension 6: 3 Client is not engaged in structured, meaningful activity and the client's peers, family, significant other, and living environment are unsupportive, or there is significant criminal justice system involvement.  Dim 6 Narrative:  There is no current legal involvement. Identifies , daughter and boss as supportive. Reports not feeling needed and cared for at home due to family hurting from grief. Describes herself as \"emotional\" and her daughter and  as \"rational.\" Reports to feeling disconnected from him. Reports to feeling more similar to her late son. Reports improvement in her relationship with her daughter. 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     Initial Service Plan (ISP)     Immediate health, safety, and preliminary service needs identified and plan includes the following based on available information from clients, referral sources, and collateral information.     Safety (SI, SIB, suicide attempts, aggressive behaviors):  History of SI (suicidal ideation)?  No  History of SIB (self-injurious behavior)?  No  History of VI (violent ideation)?  No  History of HI (homicidal ideation)?  No     No additional safety plan required at this time       Name: Zaira Ahn  YOB: 1960  Date: 9/20/2023  My primary care provider: Dr. Brasher   My primary care clinic: [facility]  My prescriber: Dr. Brasher   Other care team support:  Dr. Bosch for psychiatry.     My Triggers (check the ones that apply to you):   Relationship Conflict-With spouse at times  Work Difficulties- Works at a liquor store and feels overwhelmed sometimes  School Concerns- NA  Home Environment-  \"I don't always feel like people ask me what I need\"  Social Support- My manager at work.  Peer Relationships- Not many  Financial Concerns- None  Housing Concerns- None  Medical/Health- None  Substance Use-Ongoing use of " "alcohol.  Legal Difficulties- None         Additional People, Places, and Things that I can access for support:      Nothing else right now.       What is important to me and makes life worth living:   My family. I have 2 granddaughters, 12 and 14, and my .          GREEN     Good Control  1. I feel good  2. No suicidal thoughts   3. Can work, sleep and play        Action Steps  1. Self-care: balanced meals, exercising, sleep practices, etc.  2. Take your medications as prescribed.  3. Continue meetings with prescriber.  4.  Do the healthy things that I enjoy.  5. Other: NA                      YELLOW  Getting Worse  I have ANY of these:  1. I do not feel good  2. Difficulty Concentrating  3. Sleep is changing  4. Increase/Change in my thoughts to hurt self and/or others, but I can still manage and not act on it.   5. Not taking care of self.  6. Other:  NA                Action Steps (in addition to the above):  1. Inform your therapist and psychiatric prescriber/PCP.  2. Keep taking your medications as prescribed.    3. Turn to people you can ask for help.  4. Use internal coping strategies -see below.  5. Create safe environment:        -Store firearms for safety       -Lock and limit medications       -Notify friends/family of increase in symptoms  6. Other: NA                     RED  Get Help  If I have ANY of these:  1. Current and uncontrollable thoughts and/or behaviors to hurt self and/or others.   2. Other: NA    Actions to manage my safety  1.Contact your emergency person:   -Name:  -Number:  2. Call or Text [phone]  3. Call my crisis team-         -County:        -Number:  3. Or Call 911 or go to the emergency room right away  4.  Other: NA                    My Internal Coping Strategies include the following ([place] ones you use):  Take a bath  Blow bubbles  Belly breathing  Arts & Crafts  Coloring  Chew gum  Fidget toys: \"I am a fidgeter but no fidgets.\"   Safe space: \"my bedroom\"  Time with pets: " "\"I have a 4 year old ata hound named Rad and my son's cat, Brianne. Rad sleeps with me.\"  Coping toolbox  Temperature change  Exercise  Other: NA     Safety Concerns  How To Identify Situations That Make Your Mental Health Worse:  Triggers are things that make your mental health worse.  Look at the list below to help you find your triggers and what you can do about them.      1. Identify Early Warning Signs:     Sometimes symptoms return, even when people do their best to stay well. Symptoms can develop over a short period of time with little or no warning, but most of the time they emerge gradually over several weeks.  Early warning signs are changes that people experience when a relapse is starting. Some early warning signs are common and others are not as common.   Common Early Warning Signs:   Yes, Feeling tense or nervous: \"Yes but I don't know why. I think a lot of it might be my working situation making me nervous. I want to stay until I am 64 for my intermediate but I am willing to make changes to working at the liquor store I don't want to leave my boss because we are close. I am going to take it and see how it goes, if I need to quit then I will quit working at the liquor store.\"  Eating less or more  Trouble sleeping  Yes, Feeling depressed or low: \"Experience this on half of the days. My psychiatrist does not prescribe medications to me so I will need to see someone who will. I would like to get help managing my anxiety which can come on so suddenly. I have not heard about antabuse but I would like to get some help managing my cravings.\"   Yes, Feeling irritable: \"At work towards co-workers who don't work well but I hide it well. I don't think I am as depressed so I don't feel as stressed by my co-workers.\"  Yes, Isolating: \"I can get lost in my books and stay to myself.\"  Yes, Trouble concentrating: \"I get brain fog but I think it is due to COVID. Maybe alcoholism too.\"  Urges to harm self  Urges " to harm others  Other: NA        2. Identify action steps to take when warning signs are noticed:     Taking Action- It is important to take action if you are experiencing early warning signs of a relapse.  The faster you act, the more likely it is that you can avoid a full relapse.  It is helpful to identify several specific ways to cope with symptoms.       The following is my list of symptoms and coping strategies that I can use when they are present:     Symptom Coping Strategies   Anxiety -Talk with someone in your support system and let him or her know how you are feeling.  -Use relaxation techniques such as deep breathing or imagery.  -Use positive affirmations to counteract negative self-talk such as  I am learning to let go of worry.    Depression - Schedule your day; include activities you must do and activities you enjoy doing.  - Get some exercise - walk, run, bike, or swim.  - Give yourself credit for even the smallest things you get done.   Sleep Difficulties    - Go to sleep at the same time every day.  - Do something relaxing before bed, such as drinking herbal tea or listening to music.  - Avoid having discussions about upsetting topics before going to bed.   Delusions    - Distract yourself from the disturbing thought by doing something that requires your attention such as a puzzle.  - Check out your beliefs by talking to someone you trust.    Hallucinations    - Use headphones to listen to music.  - Tell voices to  stop  or say to yourself,  I am safe.   - Ignore the hallucinations as much as possible; focus on other things.   Concentration Difficulties - Minimize distractions so there is only one thing for you to focus on at a time.    - Ask the person you are having a conversation with to slow down or repeat things you are unsure of.       Patient consented to co-developed safety plan.  Safety and risk management plan was completed.  Patient agreed to use safety plan should any safety concerns  arise.  A copy was given to the patient.      Health:  Client does NOT have health issues that would impede participation in treatment     Transportation: Drive to treatment.                  Patient does not have any identified barriers to participating in referred services.     Vulnerable Adult Assessment     Does the patient possess a physical or mental infirmity or other physical, mental, or emotional dysfunction?  No. Patient is not a vulnerable adult.     This patient is not a functional Vulnerable Adult according to [place] Statute 626.5572 subdivision 21.       Treatment suggestions for client for the time period until the initial treatment planning session:  Urges and Cravings worksheet and familiarizing with basic self-care.     [place] Re-Assessment:      Have you ever wished you were dead or that you could go to sleep and not wake up? Lifetime?  No   Past Month?  No      Have you actually had any thoughts of killing yourself?  Lifetime?  No   Past Month?  No      Have you been thinking about how you might do this? Lifetime?  No   Past Month?  No      Have you had these thoughts and had some intention of acting on them?  Lifetime?  No   Past Month?  No      Have you started to work out the details of how to kill yourself?  Lifetime?  No   Past Month?  No      Do you intend to carry out this plan?   No      When you have the thoughts how long do they last?   N/A     Are there things - anyone or anything (ie Family, Nondenominational, pain of death) that stopped you from wanting to die or acting on thoughts of suicide?   Does not apply         2008  [employer]  Used with permission by Kelsey Adams, PhD.      Tammy Wade, MPS, LSW, LADC, LPCC  MI/CD Psychotherapist  [facility] [address], [place] [address]  Phone: [phone] Fax:  [phone]

## 2023-09-20 NOTE — PROGRESS NOTES
"Individual Session Summary   START TIME: 11 AM   END TIME: 12:10 PM   Duration: 1.5 Hours        Data:  Met with client on this date for service initiation with [facility]. The session focused on updating her comprehensive assessment, intake information and on developing her treatment plan.  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 She reports that a new person is hired and explained her role  on the job that will not interfere with treatment. Reports that she is  very assertive at work  and identifies feeling more empowered through work. 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 She said that  I don't want to say time will tell but I plan to be more open if triggers come up.  She reports that she is typically more  closed off  with her  and daughter and that her plan is to talk more to her family, do knitting and use her reading corner more. She denies feeling temptation to drink while on the job because  it doesn't help me concentrate and I am in a senior position.  She reports that she does not drink on the job because to be  on it and it does not set up a good relationship with the employees.\" Explained an adverse experience with reaching out for support group and states that she felt harassed by the organizer because they called her  when she decided not to participate. Zaira reports last use was on 9/12 and \"using vitamins has helped like milk thistle\". Reports that she has been feeling less depressed and identifies her reading corner and knitting as helpful. Zaira reports that she is ready to start group tomorrow.      Intervention: CBT, MI, goal planning, information sharing about the program.      Assessment: No sighs of intoxication or withdrawals observed. Zaira was neatly groomed and energetic during the session. She presented with motivation for sobriety and " was less emotional compared to previous session in August.      Plan. Client will maintain longer periods of sobriety. Client will begin programming on 9/21 at 1p.         Tamym Wade, RAFAELC, LADC

## 2023-09-21 ENCOUNTER — HOSPITAL ENCOUNTER (OUTPATIENT)
Dept: BEHAVIORAL HEALTH | Facility: CLINIC | Age: 63
Discharge: HOME | End: 2023-09-21
Attending: FAMILY MEDICINE
Payer: COMMERCIAL

## 2023-09-21 PROCEDURE — H2035 A/D TX PROGRAM, PER HOUR: HCPCS | Mod: HQ | Performed by: SOCIAL WORKER

## 2023-09-21 NOTE — ADDENDUM NOTE
Encounter addended by: Tammy Wade LPCC, LADC on: 9/21/2023 6:09 PM   Actions taken: Clinical Note Signed

## 2023-09-22 NOTE — GROUP NOTE
Group Therapy Documentation    PATIENT'S NAME: Zaira Ahn  MRN:   [id number]  :   1960  ACCT. NUMBER: [id number]  DATE OF SERVICE: 23  START TIME:  1:00 PM  END TIME:  4:00 PM  FACILITATOR(S): Ara Krause LICSW  TOPIC: BEH Group Therapy  Number of patients attending the group:  4  Group Length:  3 Hours    Group Therapy Type: Addiction    Summary of Group / Topics Discussed:    Psychoeducation/Skills Mindfulness in Action (IOP)  Learn what mindfulness is and how to practice it. Learn how to increase awareness of the present moment, thereby reducing ruminating thoughts and learn how to embrace negative emotions instead of suppressing or avoiding them. Consistent practice has been shown to decrease reactivity and help facilitate effective action to make positive changes in behavior.     Objective(s):  Identify 2 skills to increase awareness of the present moment to reduce negative impact of ruminating thoughts  Describe how to embrace negative emotions instead of suppressing or avoiding them.  Give 1 example of how mindfulness works in the brain to calm the Survival part of the Midbrain (which is overactive in addiction)  Give1 example of how mindfulness practice may decrease reactivity and facilitate effective action    Structure (modalities, homework, worksheets, etc.):  Complete  before and after  worksheet for mindfulness practice ([employer] [place] Exercise)  Participate in Awareness of Sounds meditation  Participate in Mindful Eating meditation    Expected therapeutic outcome(s):   Increased ability to remain in present moment  Increased ability to tolerate negative emotions without returning to addiction    Therapeutic outcome(s) measured by:   Teachback and group review and evaluation form.      Group Attendance:  Attended group session    Patient's response to the group topic/interactions:  expressed readiness to alter behaviors and expressed understanding of topic    Patient appeared to be  "Attentive.        Client specific details:  Zaira said she prefers to go by \"Lisa.\" This was Lisa's first group with this therapist and she shared what brought her to treatment and that her substance(s) of concern is is alcohol and her date of last use is Wed, 9/13/23. She was very open for her first group and shared the her 37 yo son's death 2 years ago put her into a downward spiral with alcohol and depression. She has a supportive  and daughter who also are grieving his loss. She does know what mindfulness is but is not practicing regularly. She rated her stress at 0 today as she was glad to get into treatment and is feeling good about the experience so far. Her cravings have subsided somewhat and are not daily at this time. She had a terrible experience when calling AA and the number that she got off [employer] for AA must not have been someone affiliated with AA as they shamed her, told she can't go to AA unto she has professional treatment. After she hung up, he called her back berating her, it was a very unusual experience. She is bringing us this number so we can find out how to stop this misinformation. She was given the booklet for all Sober Support meetings in the state. Lisa said the Mindful Listening exercise relaxed her and her [place] exercise revolved around a repair for her their home that was stressful. Page one reflected fear, anger and anxiety while page two showed her counteracting her fear by simply getting the check from her daughter tomorrow and following through on this project.  Her evaluation and review of the group revealed she was surprised by how the mindfulness helped her relax and feel more calm about her project today. This topic completes one of the six MH Skills Groups required under D3 of her Treatment Plan.      "

## 2023-09-25 ENCOUNTER — HOSPITAL ENCOUNTER (OUTPATIENT)
Dept: BEHAVIORAL HEALTH | Facility: CLINIC | Age: 63
Discharge: HOME | End: 2023-09-25
Attending: FAMILY MEDICINE
Payer: COMMERCIAL

## 2023-09-25 DIAGNOSIS — I10 ESSENTIAL HYPERTENSION: ICD-10-CM

## 2023-09-25 PROCEDURE — H2035 A/D TX PROGRAM, PER HOUR: HCPCS | Mod: HQ | Performed by: COUNSELOR

## 2023-09-25 NOTE — TELEPHONE ENCOUNTER
"Request for medication refill:  atenolol-chlorthalidone (TENORETIC) 50-25 MG tablet     Providers if patient needs an appointment and you are willing to give a one month supply please refill for one month and  send a letter/MyChart using \".SMILLIMITEDREFILL\" .smillimited and route chart to \"P SMI \" (Giving one month refill in non controlled medications is strongly recommended before denial)    If refill has been denied, meaning absolutely no refills without visit, please complete the smart phrase \".smirxrefuse\" and route it to the \"P SMI MED REFILLS\"  pool to inform the patient and the pharmacy.    Jose Raul Colmenares CMA      "

## 2023-09-25 NOTE — GROUP NOTE
Group Therapy Documentation    PATIENT'S NAME: Zaira Ahn  MRN:   [id number]  :   1960  [id number]  DATE OF SERVICE: 23  START TIME:  1:00 PM  END TIME:  4:00 PM  FACILITATOR(S): Tammy Wade, ERIN, VANDANA  TOPIC: BEH Group Therapy  Number of Clients attending the group:  5  Group Length:  3 Hours    Group Therapy Type: Addiction, Psychoeducation, and Skills/Education    Summary of Group / Topics Discussed:    Co-occurring Illness, Coping Skills/Lifestyle Managemet, Choices in Recovery, Distress Tolerance, Grief & Loss, Interpersonal Effectiveness, Journaling, Meditation/Breathing Exercises, Mindfulness, Proper Nutrition & Exercise, Relaxation Techniques, Reviewed Previous Skills Group Topic (Relapse Prevention), Self-Care Activities, Sleep Hygiene, Stress Management, Symptom Management, and Psychoeducation/Skills Relapse Prevention (SARAY)  This topic will give a general overview of basic relapse prevention, including definitions of warning signs, triggers and cravings and the importance of addressing healthy coping skills for ongoing relapse prevention.    Objective(s):  Clients will identify 4 key warning signs and triggers  Clients will identify 4 healthy coping mechanisms         Structure (modalities, homework, worksheets, etc.):  Provide psychoeducation on relapse prevention and healthy coping methods.  Facilitate group discussion around each patient s current awareness of warning signs,  triggers and cravings.     Expected therapeutic outcome(s):    Patient will:  Be able to manage triggers and cravings without returning to substance use.      Therapeutic outcomes measured by:  Clients will name 4 of their warning signs and triggers  Clients will name 4 healthy coping mechanisms for dealing with their warning signs and triggers      Group Attendance:  Attended group session    Patient's response to the group topic/interactions:  cooperative with task, discussed personal  "experience with topic, expressed readiness to alter behaviors, expressed understanding of topic, gave appropriate feedback to peers, listened actively, and offered helpful suggestions to peers    Patient appeared to be Actively participating, Attentive, and Engaged.        Client specific details:  Zaira appear tired and was emotional in group. She explained that she had a hard weekend of meeting with people who inquired and expressed sympathy for the loss of her son. She expressed not wanting to do so with the people she interacted with and reports that it caused her grief to surface and anxiety to worsen. Zaira denies any alcohol use of the weekend and said that she engaged in reading and self-calming mindful activities to cope. She described today as a \"down day\" and said that she did not sleep well last night. She described it as restless, knowing that I was asleep because I was dreaming but not feeling rested and cannot remember the dreams.\" Zaira participated in mindful breathing and movement to self-calm. She received feedback from the group about ideas for self-comfort during difficult experiences including using a blanket, allow the sadness to process and spending time in a safe space. Zaira participated well in sharing motives for alcohol use and identified \"To cope with traumatic events, depression and anxiety, loss and to numb out\" as reasons for use. She identified keep learning about mindfulness, and having open communication as alternative ways to fill those needs and wants. She reports that she hopes to resolve her grief and loss and eventually meeting with a psychiatrist to explore anti-depressants. Zaira identified with \"Play it through\" as a relapse prevention technique that she could see herself using to cope with urges. Zaira checked in after group and states that she plans to spend time in her reading corner this evening and wants to make BLT for dinner as a distraction from current " difficult experiences. She plans to also share with her , her current experience of grief for their son.     Zaira offered encouragement and support to a peer who was graduating.

## 2023-09-25 NOTE — LETTER
September 27, 2023    Zaira Ahn  [address]  [facility] [address]       September 27, 2023      Zaira  [address] [facility] [address]          Dear Zaira,      A request for a refill on your Atenolol-Chlorthalidone prescription was sent to us from your pharmacy. I have notified the pharmacy to allow you one more refill.     It is time for your recheck at the clinic so we can monitor the medication and continue to prescribe it safely. Please make clinic appointment with me or another provider at [facility] in the next one month. This visit could be virtual or in-person.    Thank you for choosing us as your healthcare provider.      Sincerely,    Gwen Aparicio MD

## 2023-09-25 NOTE — PROGRESS NOTES
D)  Therapist reviewed ct status.  Alcohol UD, severe; symptoms of anxiety and depression.  DA to follow.  Ct has family support.  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  She is dealing with grief/loss.  P)  Establish beginning tx goals.  Complete DA.    Marivel Beck  Clinical

## 2023-09-26 ENCOUNTER — TELEPHONE (OUTPATIENT)
Dept: BEHAVIORAL HEALTH | Facility: CLINIC | Age: 63
End: 2023-09-26

## 2023-09-27 ENCOUNTER — TELEPHONE (OUTPATIENT)
Dept: BEHAVIORAL HEALTH | Facility: CLINIC | Age: 63
End: 2023-09-27

## 2023-09-27 RX ORDER — ATENOLOL AND CHLORTHALIDONE TABLET 50; 25 MG/1; MG/1
1 TABLET ORAL DAILY
Qty: 30 TABLET | Refills: 0 | Status: SHIPPED | OUTPATIENT
Start: 2023-09-27 | End: 2023-12-11

## 2023-10-11 ENCOUNTER — TELEPHONE (OUTPATIENT)
Dept: BEHAVIORAL HEALTH | Facility: CLINIC | Age: 63
End: 2023-10-11
Payer: COMMERCIAL

## 2023-10-11 NOTE — TELEPHONE ENCOUNTER
----- Message from HONG Perez sent at 10/10/2023  3:52 PM CDT -----  Regarding: Please remove discharged client from schedule  This client discharged on 9/28/23, she had just started but only showed up one day. Please remove her from the schedule, [facility], [id number]. Thank you!

## 2023-10-29 ENCOUNTER — HEALTH MAINTENANCE LETTER (OUTPATIENT)
Age: 63
End: 2023-10-29

## 2023-12-11 ENCOUNTER — TELEPHONE (OUTPATIENT)
Dept: FAMILY MEDICINE | Facility: CLINIC | Age: 63
End: 2023-12-11
Payer: COMMERCIAL

## 2023-12-11 DIAGNOSIS — I10 ESSENTIAL HYPERTENSION: ICD-10-CM

## 2023-12-11 NOTE — TELEPHONE ENCOUNTER
Patient states they are aware that Dr. Brasher wanted them to come in to see him before another refill, but patient is starting an inpatient program in [place], [place] tomorrow, 12/12 called [facility]. Patient will not be able to leave or have their phone on them. So patient was hoping for a bridge refill so her daughter can pick it  up for patient and bring it to her while in treatment. Patient is scheduled to see Dr. Brasher on 1/29.  If patient is in treatment before clinic gets to call her back, she requests we call her daughter, Kailey ([phone])

## 2023-12-11 NOTE — CONFIDENTIAL NOTE
[facility] phone call message- patient requesting a refill:    Full Medication Name: atenolol-chlorthalidone (TENORETIC) 50-25 MG tablet     Dose: Take 1 tablet by mouth daily Please follow-up before next refill - Oral     Pharmacy confirmed as   [id number] [address] [facility] & [address] [place] [id number]  Phone: [phone] Fax: [phone]  : Yes    Additional Comments: Patient states they are aware that Dr. Brasher wanted them to come in to see him before another refill, but patient is starting an inpatient program in [place], [place] tomorrow, 12/12 called [facility]. Patient will not be able to leave or have their phone on them. So patient was hoping for a bridge refill so her daughter can pick it  up for patient and bring it to her while in treatment. Patient is scheduled to see Dr. Brasher on 1/29.  If patient is in treatment before clinic gets to call her back, she requests we call her daughter, Kailey ([phone])    OK to leave a message on voice mail? Yes    Primary language: English      needed? No    Call taken on December 11, 2023 at 3:14 PM by Stephanie Gomez

## 2023-12-12 RX ORDER — ATENOLOL AND CHLORTHALIDONE TABLET 50; 25 MG/1; MG/1
1 TABLET ORAL DAILY
Qty: 30 TABLET | Refills: 0 | Status: SHIPPED | OUTPATIENT
Start: 2023-12-12 | End: 2024-01-24

## 2024-01-07 ENCOUNTER — HEALTH MAINTENANCE LETTER (OUTPATIENT)
Age: 64
End: 2024-01-07

## 2024-01-24 DIAGNOSIS — I10 ESSENTIAL HYPERTENSION: ICD-10-CM

## 2024-01-24 RX ORDER — ATENOLOL AND CHLORTHALIDONE TABLET 50; 25 MG/1; MG/1
TABLET ORAL
Qty: 30 TABLET | Refills: 0 | Status: SHIPPED | OUTPATIENT
Start: 2024-01-24 | End: 2024-02-21

## 2024-02-21 ENCOUNTER — OFFICE VISIT (OUTPATIENT)
Dept: FAMILY MEDICINE | Facility: CLINIC | Age: 64
End: 2024-02-21
Payer: COMMERCIAL

## 2024-02-21 VITALS
DIASTOLIC BLOOD PRESSURE: 86 MMHG | TEMPERATURE: 97.2 F | RESPIRATION RATE: 20 BRPM | SYSTOLIC BLOOD PRESSURE: 133 MMHG | WEIGHT: 142.5 LBS | OXYGEN SATURATION: 98 % | BODY MASS INDEX: 23.74 KG/M2 | HEIGHT: 65 IN | HEART RATE: 77 BPM

## 2024-02-21 DIAGNOSIS — I10 PRIMARY HYPERTENSION: ICD-10-CM

## 2024-02-21 DIAGNOSIS — E78.2 MIXED HYPERLIPIDEMIA: ICD-10-CM

## 2024-02-21 DIAGNOSIS — F32.A DEPRESSION, UNSPECIFIED DEPRESSION TYPE: Primary | ICD-10-CM

## 2024-02-21 DIAGNOSIS — Z23 NEED FOR VACCINATION: ICD-10-CM

## 2024-02-21 DIAGNOSIS — Z12.31 VISIT FOR SCREENING MAMMOGRAM: ICD-10-CM

## 2024-02-21 DIAGNOSIS — I10 ESSENTIAL HYPERTENSION: ICD-10-CM

## 2024-02-21 DIAGNOSIS — Z23 NEED FOR PROPHYLACTIC VACCINATION AND INOCULATION AGAINST INFLUENZA: ICD-10-CM

## 2024-02-21 DIAGNOSIS — F10.20 ALCOHOL USE DISORDER, MODERATE, DEPENDENCE (H): ICD-10-CM

## 2024-02-21 DIAGNOSIS — Z12.11 SCREEN FOR COLON CANCER: ICD-10-CM

## 2024-02-21 DIAGNOSIS — Z00.00 ROUTINE GENERAL MEDICAL EXAMINATION AT A HEALTH CARE FACILITY: ICD-10-CM

## 2024-02-21 DIAGNOSIS — E11.9 TYPE 2 DIABETES MELLITUS WITHOUT COMPLICATION, WITHOUT LONG-TERM CURRENT USE OF INSULIN (H): Primary | ICD-10-CM

## 2024-02-21 DIAGNOSIS — Z23 ENCOUNTER FOR IMMUNIZATION: ICD-10-CM

## 2024-02-21 LAB — HBA1C MFR BLD: 5.1 % (ref 0–5.6)

## 2024-02-21 PROCEDURE — 90480 ADMN SARSCOV2 VAC 1/ONLY CMP: CPT | Performed by: FAMILY MEDICINE

## 2024-02-21 PROCEDURE — 82043 UR ALBUMIN QUANTITATIVE: CPT | Performed by: FAMILY MEDICINE

## 2024-02-21 PROCEDURE — 90686 IIV4 VACC NO PRSV 0.5 ML IM: CPT | Performed by: FAMILY MEDICINE

## 2024-02-21 PROCEDURE — 82570 ASSAY OF URINE CREATININE: CPT | Performed by: FAMILY MEDICINE

## 2024-02-21 PROCEDURE — 36415 COLL VENOUS BLD VENIPUNCTURE: CPT | Performed by: FAMILY MEDICINE

## 2024-02-21 PROCEDURE — 91320 SARSCV2 VAC 30MCG TRS-SUC IM: CPT | Performed by: FAMILY MEDICINE

## 2024-02-21 PROCEDURE — 90472 IMMUNIZATION ADMIN EACH ADD: CPT | Performed by: FAMILY MEDICINE

## 2024-02-21 PROCEDURE — 83036 HEMOGLOBIN GLYCOSYLATED A1C: CPT | Performed by: FAMILY MEDICINE

## 2024-02-21 PROCEDURE — 99396 PREV VISIT EST AGE 40-64: CPT | Mod: 25 | Performed by: FAMILY MEDICINE

## 2024-02-21 PROCEDURE — 80048 BASIC METABOLIC PNL TOTAL CA: CPT | Performed by: FAMILY MEDICINE

## 2024-02-21 PROCEDURE — 90678 RSV VACC PREF BIVALENT IM: CPT | Performed by: FAMILY MEDICINE

## 2024-02-21 PROCEDURE — 90471 IMMUNIZATION ADMIN: CPT | Performed by: FAMILY MEDICINE

## 2024-02-21 PROCEDURE — 80061 LIPID PANEL: CPT | Performed by: FAMILY MEDICINE

## 2024-02-21 RX ORDER — ATENOLOL AND CHLORTHALIDONE TABLET 50; 25 MG/1; MG/1
TABLET ORAL
Qty: 30 TABLET | Refills: 0 | Status: CANCELLED | OUTPATIENT
Start: 2024-02-21

## 2024-02-21 RX ORDER — ATENOLOL AND CHLORTHALIDONE TABLET 50; 25 MG/1; MG/1
1 TABLET ORAL DAILY
Qty: 90 TABLET | Refills: 3 | Status: SHIPPED | OUTPATIENT
Start: 2024-02-21

## 2024-02-21 SDOH — HEALTH STABILITY: PHYSICAL HEALTH: ON AVERAGE, HOW MANY DAYS PER WEEK DO YOU ENGAGE IN MODERATE TO STRENUOUS EXERCISE (LIKE A BRISK WALK)?: 2 DAYS

## 2024-02-21 ASSESSMENT — SOCIAL DETERMINANTS OF HEALTH (SDOH): HOW OFTEN DO YOU GET TOGETHER WITH FRIENDS OR RELATIVES?: MORE THAN THREE TIMES A WEEK

## 2024-02-21 NOTE — PROGRESS NOTES
Preventive Care Visit  [facility] CELESTINO Brasher MD, Family Medicine  Feb 21, 2024    Assessment & Plan     Type 2 diabetes mellitus without complication, without long-term current use of insulin (H)  REsolved because of weight loss -normal A1c  - Hemoglobin A1c  - Albumin Random Urine Quantitative with Creat Ratio    Primary hypertension  controlled  - Basic metabolic panel    Essential hypertension  controlled  - atenolol-chlorthalidone (TENORETIC) 50-25 MG tablet; Take 1 tablet by mouth daily    Screen for colon cancer    - Colonoscopy Screening  Referral; Future    Visit for screening mammogram    - MA SCREENING DIGITAL BILAT - Future  (s+30); Future       Alcohol use disorder, moderate, dependence (H)  In recovery           Routine general medical examination at a health care facility    - DEXA - HIM Scan    Need for vaccination    - COVID-19 12+ (2023-24) (PFIZER)    Need for prophylactic vaccination and inoculation against influenza    - INFLUENZA VACCINE IM > 6 MONTHS VALENT IIV4 (AFLURIA/FLUZONE)    Encounter for immunization    - RSV VACCINE (ABRYSVO)              Nicotine/Tobacco Cessation  She reports that she has been smoking cigarettes. She has a 1.3 pack-year smoking history. She has never used smokeless tobacco.  Nicotine/Tobacco Cessation Plan  Self help information given to patient      Counseling  Appropriate preventive services were discussed with this patient, including applicable screening as appropriate for fall prevention, nutrition, physical activity, Tobacco-use cessation, weight loss and cognition.  Checklist reviewing preventive services available has been given to the patient.  Reviewed patient's diet, addressing concerns and/or questions.   She is at risk for lack of exercise and has been provided with information to increase physical activity for the benefit of her well-being.   The patient was instructed to see the dentist every 6 months.         Return  "in about 1 year (around 2025) for Annual Wellness Visit.    Subjective   Zaira is a 63 year old, presenting for the following: Patient reports son  2 years ago. In November Zaira went into inpatient detox 5 days. Then went to 30 days inpatient in [facility] but left very shortly after arriving. (1.5 hours left). Facility was not ready for her, lost her paper work, and placed her in room with 2 other people (teenagers). Only one bathroom for 16 people. So left because it did not feel right. Then referred to [facility] an intensive outpatient program. Still in 16 week extensive treatment program. Once off alcohol she has not had a craving since. She and her spouse found their son when he . Is now working with a grief counselor too. They labelled her \"terminally unique\".   Today the patient talks about having a lot of body aches and stiffness. Turning over in bed is painful, getting out of bed she is stiff. Pain  and aches radiates from sacrum down the back of her legs. Neck pain. Reports pain from right elbow up to right side of neck. She reports that she gets numb and tingling feeling from wrist to upper arm. Also reports more significant veins in right arm. Reports that her hearing has decreased, especially in the left ear. 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 She feels fatigued, tired and not getting good rest, she is up Q 2 hours every night.          Physical and Imm/Inj (Flu Shot)        2024     2:18 PM   Additional Questions   Roomed by Lazaro   Accompanied by Self         2024    Information    services provided? No        Health Care Directive  Patient does not have a Health Care Directive or Living Will: Discussed advance care planning with patient; however, patient declined at this time.    HPI            2024   General Health   How would you rate your overall physical health? (!) FAIR   Feel stress (tense, anxious, " or unable to sleep) To some extent   (!) STRESS CONCERN      2/21/2024   Nutrition   Three or more servings of calcium each day? Yes   Diet: Carbohydrate counting    Vegetarian/vegan   How many servings of fruit and vegetables per day? (!) 2-3   How many sweetened beverages each day? 0-1         2/21/2024   Exercise   Days per week of moderate/strenous exercise 2 days   (!) EXERCISE CONCERN      2/21/2024   Social Factors   Frequency of gathering with friends or relatives More than three times a week   Worry food won't last until get money to buy more No   Food not last or not have enough money for food? No   Do you have housing?  Yes   Are you worried about losing your housing? No   Lack of transportation? No   Unable to get utilities (heat,electricity)? No         2/21/2024   Fall Risk   Fallen 2 or more times in the past year? Yes   Trouble with walking or balance? Yes   Gait Speed Test (Document in seconds) 4.47   Gait Speed Test Interpretation Less than or equal to 5.00 seconds - PASS          2/21/2024   Dental   Dentist two times every year? (!) NO - last visit was more than 10 years ago - dental phobia.         2/21/2024   TB Screening   Were you born outside of [place]?  No         Today's PHQ-2 Score:       2/21/2024     2:19 PM   PHQ-2 ( 1999 Pfizer)   Q1: Little interest or pleasure in doing things 0   Q2: Feeling down, depressed or hopeless 0   PHQ-2 Score 0           2/21/2024   Substance Use   Alcohol more than 3/day or more than 7/wk No   Do you use any other substances recreationally? No     Social History     Tobacco Use    Smoking status: Light Smoker     Packs/day: 0.10     Years: 13.00     Additional pack years: 0.00     Total pack years: 1.30     Types: Cigarettes    Smokeless tobacco: Never   Vaping Use    Vaping Use: Never used   Substance Use Topics    Alcohol use: Not Currently     Comment: in treatment    Drug use: No          Mammogram Screening - Mammogram every 1-2 years updated in Health  "Maintenance based on mutual decision making        2024   STI Screening   New sexual partner(s) since last STI/HIV test? No     History of abnormal Pap smear: NO - age 30-65 PAP every 5 years with negative HPV co-testing recommended        3/9/2022     8:20 AM   PAP / HPV   PAP Negative for Intraepithelial Lesion or Malignancy (NILM)      The 10-year ASCVD risk score (Mariela ROSADO, et al., 2019) is: 27.5%    Values used to calculate the score:      Age: 63 years      Sex: Female      Is Non- : No      Diabetic: Yes      Tobacco smoker: Yes      Systolic Blood Pressure: 133 mmHg      Is BP treated: Yes      HDL Cholesterol: 51 mg/dL      Total Cholesterol: 268 mg/dL    Fracture Risk Assessment Tool  Link to Frax Calculator  Use the information below to complete the Frax calculator  : 1960  Sex: female  Weight (kg): 64.6 kg (actual weight)  Height (cm): 163.8 cm  Previous Fragility Fracture:  No  History of parent with fractured hip:  No  Current Smoking:  Yes  Patient has been on glucocorticoids for more than 3 months (5mg/day or more): No  Rheumatoid Arthritis on Problem List:  No  Secondary Osteoporosis on Problem List:  No  Consumes 3 or more units of alcohol per day: Yes  Femoral Neck BMD (g/cm2)         2024   FRAX Calculated Score- 10yr Fracture Probability (%)   Major Osteoporotic- without BMD 18 %   Hip Fracture- without BMD 4.8 %          Reviewed and updated as needed this visit by Provider   Tobacco  Allergies  Meds  Problems  Med Hx  Surg Hx  Fam Hx  Soc   Hx Sexual Activity                   Objective    Exam  /86   Pulse 77   Temp 97.2  F (36.2  C) (Tympanic)   Resp 20   Ht 1.638 m (5' 4.5\")   Wt 64.6 kg (142 lb 8 oz)   SpO2 98%   BMI 24.08 kg/m     Estimated body mass index is 24.08 kg/m  as calculated from the following:    Height as of this encounter: 1.638 m (5' 4.5\").    Weight as of this encounter: 64.6 kg (142 lb 8 oz).    Physical " Exam  Vitals reviewed.   Constitutional:       General: She is not in acute distress.     Appearance: She is well-developed. She is not diaphoretic.   HENT:      Head: Normocephalic.   Eyes:      General: No scleral icterus.     Conjunctiva/sclera: Conjunctivae normal.   Neck:      Thyroid: No thyromegaly.   Cardiovascular:      Rate and Rhythm: Normal rate and regular rhythm.      Heart sounds: Normal heart sounds. No murmur heard.  Pulmonary:      Effort: Pulmonary effort is normal. No respiratory distress.      Breath sounds: Normal breath sounds. No wheezing.   Musculoskeletal:      Right shoulder: Normal.      Left shoulder: Normal.      Cervical back: Normal range of motion.      Left lower leg: No edema.      Comments: Mild tenderness on biceps   Skin:     General: Skin is warm and dry.   Neurological:      Mental Status: She is alert and oriented to person, place, and time.   Psychiatric:         Behavior: Behavior normal.         Thought Content: Thought content normal.         Judgment: Judgment normal.             Signed Electronically by: Wei Brasher MD

## 2024-02-21 NOTE — PATIENT INSTRUCTIONS
Preventive Care Advice   This is general advice given by our system to help you stay healthy. However, your care team may have specific advice just for you. Please talk to your care team about your preventive care needs.  Nutrition  Eat 5 or more servings of fruits and vegetables each day.  Try wheat bread, brown rice and whole grain pasta (instead of white bread, rice, and pasta).  Get enough calcium and vitamin D. Check the label on foods and aim for 100% of the RDA (recommended daily allowance).  Lifestyle  Exercise at least 150 minutes each week  (30 minutes a day, 5 days a week).  Do muscle strengthening activities 2 days a week. These help control your weight and prevent disease.  No smoking.  Wear sunscreen to prevent skin cancer.  Have a dental exam and cleaning every 6 months.  Yearly exams  See your health care team every year to talk about:  Any changes in your health.  Any medicines your care team has prescribed.  Preventive care, family planning, and ways to prevent chronic diseases.  Shots (vaccines)   HPV shots (up to age 26), if you've never had them before.  Hepatitis B shots (up to age 59), if you've never had them before.  COVID-19 shot: Get this shot when it's due.  Flu shot: Get a flu shot every year.  Tetanus shot: Get a tetanus shot every 10 years.  Pneumococcal, hepatitis A, and RSV shots: Ask your care team if you need these based on your risk.  Shingles shot (for age 50 and up)  General health tests  Diabetes screening:  Starting at age 35, Get screened for diabetes at least every 3 years.  If you are younger than age 35, ask your care team if you should be screened for diabetes.  Cholesterol test: At age 39, start having a cholesterol test every 5 years, or more often if advised.  Bone density scan (DEXA): At age 50, ask your care team if you should have this scan for osteoporosis (brittle bones).  Hepatitis C: Get tested at least once in your life.  STIs (sexually transmitted  infections)  Before age 24: Ask your care team if you should be screened for STIs.  After age 24: Get screened for STIs if you're at risk. You are at risk for STIs (including HIV) if:  You are sexually active with more than one person.  You don't use condoms every time.  You or a partner was diagnosed with a sexually transmitted infection.  If you are at risk for HIV, ask about PrEP medicine to prevent HIV.  Get tested for HIV at least once in your life, whether you are at risk for HIV or not.  Cancer screening tests  Cervical cancer screening: If you have a cervix, begin getting regular cervical cancer screening tests starting at age 21.  Breast cancer scan (mammogram): If you've ever had breasts, begin having regular mammograms starting at age 40. This is a scan to check for breast cancer.  Colon cancer screening: It is important to start screening for colon cancer at age 45.  Have a colonoscopy test every 10 years (or more often if you're at risk) Or, ask your provider about stool tests like a FIT test every year or Cologuard test every 3 years.  To learn more about your testing options, visit:   https://[email or web address]/031273.pdf.  For help making a decision, visit:   https://bit.ly/nk67556.  Prostate cancer screening test: If you have a prostate, ask your care team if a prostate cancer screening test (PSA) at age 55 is right for you.  Lung cancer screening: If you are a current or former smoker ages 50 to 80, ask your care team if ongoing lung cancer screenings are right for you.  For informational purposes only. Not to replace the advice of your health care provider. Copyright   2023 [employer]. All rights reserved. Clinically reviewed by the [facility] Transitions Program. [employer] 102480 - REV 01/24.    Preventing Falls: Care Instructions  Injuries and health problems such as trouble walking or poor eyesight can increase your risk of falling. So can some medicines. But there are things  "you can do to help prevent falls. You can exercise to get stronger. You can also arrange your home to make it safer.    Talk to your doctor about the medicines you take. Ask if any of them increase the risk of falls and whether they can be changed or stopped.   Try to exercise regularly. It can help improve your strength and balance. This can help lower your risk of falling.     Practice fall safety and prevention.    Wear low-heeled shoes that fit well and give your feet good support. Talk to your doctor if you have foot problems that make this hard.  Carry a cellphone or wear a medical alert device that you can use to call for help.  Use stepladders instead of chairs to reach high objects. Don't climb if you're at risk for falls. Ask for help, if needed.  Wear the correct eyeglasses, if you need them.    Make your home safer.    Remove rugs, cords, clutter, and furniture from walkways.  Keep your house well lit. Use night-lights in hallways and bathrooms.  Install and use sturdy handrails on stairways.  Wear nonskid footwear, even inside. Don't walk barefoot or in socks without shoes.    Be safe outside.    Use handrails, curb cuts, and ramps whenever possible.  Keep your hands free by using a shoulder bag or backpack.  Try to walk in well-lit areas. Watch out for uneven ground, changes in pavement, and debris.  Be careful in the winter. Walk on the grass or gravel when sidewalks are slippery. Use de-icer on steps and walkways. Add non-slip devices to shoes.    Put grab bars and nonskid mats in your shower or tub and near the toilet. Try to use a shower chair or bath bench when bathing.   Get into a tub or shower by putting in your weaker leg first. Get out with your strong side first. Have a phone or medical alert device in the bathroom with you.   Where can you learn more?  Go to https://www.Qwaq.net/patiented  Enter G117 in the search box to learn more about \"Preventing Falls: Care Instructions.\"  Current " as of: July 18, 2023               Content Version: 13.8    6497-6759 [employer].   Care instructions adapted under license by your healthcare professional. If you have questions about a medical condition or this instruction, always ask your healthcare professional. [employer] disclaims any warranty or liability for your use of this information.      Learning About Stress  What is stress?     Stress is your body's response to a hard situation. Your body can have a physical, emotional, or mental response. Stress is a fact of life for most people, and it affects everyone differently. What causes stress for you may not be stressful for someone else.  A lot of things can cause stress. You may feel stress when you go on a job interview, take a test, or run a race. This kind of short-term stress is normal and even useful. It can help you if you need to work hard or react quickly. For example, stress can help you finish an important job on time.  Long-term stress is caused by ongoing stressful situations or events. Examples of long-term stress include long-term health problems, ongoing problems at work, or conflicts in your family. Long-term stress can harm your health.  How does stress affect your health?  When you are stressed, your body responds as though you are in danger. It makes hormones that speed up your heart, make you breathe faster, and give you a burst of energy. This is called the fight-or-flight stress response. If the stress is over quickly, your body goes back to normal and no harm is done.  But if stress happens too often or lasts too long, it can have bad effects. Long-term stress can make you more likely to get sick, and it can make symptoms of some diseases worse. If you tense up when you are stressed, you may develop neck, shoulder, or low back pain. Stress is linked to high blood pressure and heart disease.  Stress also harms your emotional health. It can make you sierra,  tense, or depressed. Your relationships may suffer, and you may not do well at work or school.  What can you do to manage stress?  You can try these things to help manage stress:   Do something active. Exercise or activity can help reduce stress. Walking is a great way to get started. Even everyday activities such as housecleaning or yard work can help.  Try yoga or jd chi. These techniques combine exercise and meditation. You may need some training at first to learn them.  Do something you enjoy. For example, listen to music or go to a movie. Practice your hobby or do volunteer work.  Meditate. This can help you relax, because you are not worrying about what happened before or what may happen in the future.  Do guided imagery. Imagine yourself in any setting that helps you feel calm. You can use online videos, books, or a teacher to guide you.  Do breathing exercises. For example:  From a standing position, bend forward from the waist with your knees slightly bent. Let your arms dangle close to the floor.  Breathe in slowly and deeply as you return to a standing position. Roll up slowly and lift your head last.  Hold your breath for just a few seconds in the standing position.  Breathe out slowly and bend forward from the waist.  Let your feelings out. Talk, laugh, cry, and express anger when you need to. Talking with supportive friends or family, a counselor, or a sera leader about your feelings is a healthy way to relieve stress. Avoid discussing your feelings with people who make you feel worse.  Write. It may help to write about things that are bothering you. This helps you find out how much stress you feel and what is causing it. When you know this, you can find better ways to cope.  What can you do to prevent stress?  You might try some of these things to help prevent stress:  Manage your time. This helps you find time to do the things you want and need to do.  Get enough sleep. Your body recovers from the  "stresses of the day while you are sleeping.  Get support. Your family, friends, and community can make a difference in how you experience stress.  Limit your news feed. Avoid or limit time on social media or news that may make you feel stressed.  Do something active. Exercise or activity can help reduce stress. Walking is a great way to get started.  Where can you learn more?  Go to https://www.[employer]actiga.net/patiented  Enter N032 in the search box to learn more about \"Learning About Stress.\"  Current as of: February 26, 2023               Content Version: 13.8    0557-7447 [employer].   Care instructions adapted under license by your healthcare professional. If you have questions about a medical condition or this instruction, always ask your healthcare professional. [employer] disclaims any warranty or liability for your use of this information.      Preventive Care Advice   This is general advice given by our system to help you stay healthy. However, your care team may have specific advice just for you. Please talk to your care team about your preventive care needs.  Nutrition  Eat 5 or more servings of fruits and vegetables each day.  Try wheat bread, brown rice and whole grain pasta (instead of white bread, rice, and pasta).  Get enough calcium and vitamin D. Check the label on foods and aim for 100% of the RDA (recommended daily allowance).  Lifestyle  Exercise at least 150 minutes each week  (30 minutes a day, 5 days a week).  Do muscle strengthening activities 2 days a week. These help control your weight and prevent disease.  No smoking.  Wear sunscreen to prevent skin cancer.  Have a dental exam and cleaning every 6 months.  Yearly exams  See your health care team every year to talk about:  Any changes in your health.  Any medicines your care team has prescribed.  Preventive care, family planning, and ways to prevent chronic diseases.  Shots (vaccines)   HPV shots (up to age 26), " if you've never had them before.  Hepatitis B shots (up to age 59), if you've never had them before.  COVID-19 shot: Get this shot when it's due.  Flu shot: Get a flu shot every year.  Tetanus shot: Get a tetanus shot every 10 years.  Pneumococcal, hepatitis A, and RSV shots: Ask your care team if you need these based on your risk.  Shingles shot (for age 50 and up)  General health tests  Diabetes screening:  Starting at age 35, Get screened for diabetes at least every 3 years.  If you are younger than age 35, ask your care team if you should be screened for diabetes.  Cholesterol test: At age 39, start having a cholesterol test every 5 years, or more often if advised.  Bone density scan (DEXA): At age 50, ask your care team if you should have this scan for osteoporosis (brittle bones).  Hepatitis C: Get tested at least once in your life.  STIs (sexually transmitted infections)  Before age 24: Ask your care team if you should be screened for STIs.  After age 24: Get screened for STIs if you're at risk. You are at risk for STIs (including HIV) if:  You are sexually active with more than one person.  You don't use condoms every time.  You or a partner was diagnosed with a sexually transmitted infection.  If you are at risk for HIV, ask about PrEP medicine to prevent HIV.  Get tested for HIV at least once in your life, whether you are at risk for HIV or not.  Cancer screening tests  Cervical cancer screening: If you have a cervix, begin getting regular cervical cancer screening tests starting at age 21.  Breast cancer scan (mammogram): If you've ever had breasts, begin having regular mammograms starting at age 40. This is a scan to check for breast cancer.  Colon cancer screening: It is important to start screening for colon cancer at age 45.  Have a colonoscopy test every 10 years (or more often if you're at risk) Or, ask your provider about stool tests like a FIT test every year or Cologuard test every 3 years.  To  learn more about your testing options, visit:   https://www.[employer]/756723.pdf.  For help making a decision, visit:   https://bit.ly/rv92276.  Prostate cancer screening test: If you have a prostate, ask your care team if a prostate cancer screening test (PSA) at age 55 is right for you.  Lung cancer screening: If you are a current or former smoker ages 50 to 80, ask your care team if ongoing lung cancer screenings are right for you.  For informational purposes only. Not to replace the advice of your health care provider. Copyright   2023 [employer]. All rights reserved. Clinically reviewed by the  [facility] [place] Transitions Program. stiQRd [id number] - REV 01/24.    Preventing Falls: Care Instructions  Injuries and health problems such as trouble walking or poor eyesight can increase your risk of falling. So can some medicines. But there are things you can do to help prevent falls. You can exercise to get stronger. You can also arrange your home to make it safer.    Talk to your doctor about the medicines you take. Ask if any of them increase the risk of falls and whether they can be changed or stopped.   Try to exercise regularly. It can help improve your strength and balance. This can help lower your risk of falling.     Practice fall safety and prevention.    Wear low-heeled shoes that fit well and give your feet good support. Talk to your doctor if you have foot problems that make this hard.  Carry a cellphone or wear a medical alert device that you can use to call for help.  Use stepladders instead of chairs to reach high objects. Don't climb if you're at risk for falls. Ask for help, if needed.  Wear the correct eyeglasses, if you need them.    Make your home safer.    Remove rugs, cords, clutter, and furniture from walkways.  Keep your house well lit. Use night-lights in hallways and bathrooms.  Install and use sturdy handrails on stairways.  Wear nonskid footwear, even inside. Don't walk  "barefoot or in socks without shoes.    Be safe outside.    Use handrails, curb cuts, and ramps whenever possible.  Keep your hands free by using a shoulder bag or backpack.  Try to walk in well-lit areas. Watch out for uneven ground, changes in pavement, and debris.  Be careful in the winter. Walk on the grass or gravel when sidewalks are slippery. Use de-icer on steps and walkways. Add non-slip devices to shoes.    Put grab bars and nonskid mats in your shower or tub and near the toilet. Try to use a shower chair or bath bench when bathing.   Get into a tub or shower by putting in your weaker leg first. Get out with your strong side first. Have a phone or medical alert device in the bathroom with you.   Where can you learn more?  Go to https://www.Bacterioscan.DoYouRemember/patiented  Enter G117 in the search box to learn more about \"Preventing Falls: Care Instructions.\"  Current as of: July 18, 2023               Content Version: 13.8    0109-2028 [employer].   Care instructions adapted under license by your healthcare professional. If you have questions about a medical condition or this instruction, always ask your healthcare professional. [employer] disclaims any warranty or liability for your use of this information.      Learning About Stress  What is stress?     Stress is your body's response to a hard situation. Your body can have a physical, emotional, or mental response. Stress is a fact of life for most people, and it affects everyone differently. What causes stress for you may not be stressful for someone else.  A lot of things can cause stress. You may feel stress when you go on a job interview, take a test, or run a race. This kind of short-term stress is normal and even useful. It can help you if you need to work hard or react quickly. For example, stress can help you finish an important job on time.  Long-term stress is caused by ongoing stressful situations or events. Examples of " long-term stress include long-term health problems, ongoing problems at work, or conflicts in your family. Long-term stress can harm your health.  How does stress affect your health?  When you are stressed, your body responds as though you are in danger. It makes hormones that speed up your heart, make you breathe faster, and give you a burst of energy. This is called the fight-or-flight stress response. If the stress is over quickly, your body goes back to normal and no harm is done.  But if stress happens too often or lasts too long, it can have bad effects. Long-term stress can make you more likely to get sick, and it can make symptoms of some diseases worse. If you tense up when you are stressed, you may develop neck, shoulder, or low back pain. Stress is linked to high blood pressure and heart disease.  Stress also harms your emotional health. It can make you sierra, tense, or depressed. Your relationships may suffer, and you may not do well at work or school.  What can you do to manage stress?  You can try these things to help manage stress:   Do something active. Exercise or activity can help reduce stress. Walking is a great way to get started. Even everyday activities such as housecleaning or yard work can help.  Try yoga or jd chi. These techniques combine exercise and meditation. You may need some training at first to learn them.  Do something you enjoy. For example, listen to music or go to a movie. Practice your hobby or do volunteer work.  Meditate. This can help you relax, because you are not worrying about what happened before or what may happen in the future.  Do guided imagery. Imagine yourself in any setting that helps you feel calm. You can use online videos, books, or a teacher to guide you.  Do breathing exercises. For example:  From a standing position, bend forward from the waist with your knees slightly bent. Let your arms dangle close to the floor.  Breathe in slowly and deeply as you  "return to a standing position. Roll up slowly and lift your head last.  Hold your breath for just a few seconds in the standing position.  Breathe out slowly and bend forward from the waist.  Let your feelings out. Talk, laugh, cry, and express anger when you need to. Talking with supportive friends or family, a counselor, or a sera leader about your feelings is a healthy way to relieve stress. Avoid discussing your feelings with people who make you feel worse.  Write. It may help to write about things that are bothering you. This helps you find out how much stress you feel and what is causing it. When you know this, you can find better ways to cope.  What can you do to prevent stress?  You might try some of these things to help prevent stress:  Manage your time. This helps you find time to do the things you want and need to do.  Get enough sleep. Your body recovers from the stresses of the day while you are sleeping.  Get support. Your family, friends, and community can make a difference in how you experience stress.  Limit your news feed. Avoid or limit time on social media or news that may make you feel stressed.  Do something active. Exercise or activity can help reduce stress. Walking is a great way to get started.  Where can you learn more?  Go to https://www.Semblee_.net/patiented  Enter N032 in the search box to learn more about \"Learning About Stress.\"  Current as of: February 26, 2023               Content Version: 13.8    0171-0297 [employer].   Care instructions adapted under license by your healthcare professional. If you have questions about a medical condition or this instruction, always ask your healthcare professional. [employer] disclaims any warranty or liability for your use of this information.      "

## 2024-02-22 LAB
ANION GAP SERPL CALCULATED.3IONS-SCNC: 12 MMOL/L (ref 7–15)
BUN SERPL-MCNC: 17 MG/DL (ref 8–23)
CALCIUM SERPL-MCNC: 10.2 MG/DL (ref 8.8–10.2)
CHLORIDE SERPL-SCNC: 98 MMOL/L (ref 98–107)
CHOLEST SERPL-MCNC: 188 MG/DL
CREAT SERPL-MCNC: 0.67 MG/DL (ref 0.51–0.95)
CREAT UR-MCNC: 81.9 MG/DL
DEPRECATED HCO3 PLAS-SCNC: 28 MMOL/L (ref 22–29)
EGFRCR SERPLBLD CKD-EPI 2021: >90 ML/MIN/1.73M2
GLUCOSE SERPL-MCNC: 102 MG/DL (ref 70–99)
HDLC SERPL-MCNC: 50 MG/DL
LDLC SERPL CALC-MCNC: 109 MG/DL
MICROALBUMIN UR-MCNC: <12 MG/L
MICROALBUMIN/CREAT UR: NORMAL MG/G{CREAT}
NONHDLC SERPL-MCNC: 138 MG/DL
POTASSIUM SERPL-SCNC: 3.5 MMOL/L (ref 3.4–5.3)
SODIUM SERPL-SCNC: 138 MMOL/L (ref 135–145)
TRIGL SERPL-MCNC: 145 MG/DL

## 2024-04-27 ENCOUNTER — E-VISIT (OUTPATIENT)
Dept: URGENT CARE | Facility: CLINIC | Age: 64
End: 2024-04-27
Payer: COMMERCIAL

## 2024-04-27 DIAGNOSIS — J01.90 ACUTE BACTERIAL SINUSITIS: Primary | ICD-10-CM

## 2024-04-27 DIAGNOSIS — B96.89 ACUTE BACTERIAL SINUSITIS: Primary | ICD-10-CM

## 2024-04-27 PROCEDURE — 99421 OL DIG E/M SVC 5-10 MIN: CPT | Performed by: PHYSICIAN ASSISTANT

## 2024-04-27 NOTE — PATIENT INSTRUCTIONS
Acute Sinusitis: Care Instructions  Overview     Acute sinusitis is an inflammation of the mucous membranes inside the nose and sinuses. Sinuses are the hollow spaces in your skull around the eyes and nose. Acute sinusitis often follows a cold. Acute sinusitis causes thick, discolored mucus that drains from the nose or down the back of the throat. It also can cause pain and pressure in your head and face along with a stuffy or blocked nose.  In most cases, sinusitis gets better on its own in 1 to 2 weeks. But some mild symptoms may last for several weeks. Sometimes antibiotics are needed if there is a bacterial infection.  Follow-up care is a key part of your treatment and safety. Be sure to make and go to all appointments, and call your doctor if you are having problems. It's also a good idea to know your test results and keep a list of the medicines you take.  How can you care for yourself at home?    Use saline (saltwater) nasal washes. This can help keep your nasal passages open and wash out mucus and allergens.  ? You can buy saline nose washes at a grocery store or drugstore. Follow the instructions on the package.  ? You can make your own at home. Add 1 teaspoon of non-iodized salt and 1 teaspoon of baking soda to 2 cups of distilled or boiled and cooled water. Fill a squeeze bottle or a nasal cleansing pot (such as a neti pot) with the nasal wash. Then put the tip into your nostril, and lean over the sink. With your mouth open, gently squirt the liquid. Repeat on the other side.    Try a decongestant nasal spray like oxymetazoline (Afrin). Do not use it for more than 3 days in a row. Using it for more than 3 days can make your congestion worse.    If needed, take an over-the-counter pain medicine, such as acetaminophen (Tylenol), ibuprofen (Advil, Motrin), or naproxen (Aleve). Read and follow all instructions on the label.    If the doctor prescribed antibiotics, take them as directed. Do not stop taking  "them just because you feel better. You need to take the full course of antibiotics.    Be careful when taking over-the-counter cold or flu medicines and Tylenol at the same time. Many of these medicines have acetaminophen, which is Tylenol. Read the labels to make sure that you are not taking more than the recommended dose. Too much acetaminophen (Tylenol) can be harmful.    Try a steroid nasal spray. It may help with your symptoms.    Breathe warm, moist air. You can use a steamy shower, a hot bath, or a sink filled with hot water. Avoid cold, dry air. Using a humidifier in your home may help. Follow the directions for cleaning the machine.  When should you call for help?   Call your doctor now or seek immediate medical care if:      You have new or worse swelling, redness, or pain in your face or around one or both of your eyes.       You have double vision or a change in your vision.       You have a high fever.       You have a severe headache and a stiff neck.       You have mental changes, such as feeling confused or much less alert.   Watch closely for changes in your health, and be sure to contact your doctor if:      You are not getting better as expected.   Where can you learn more?  Go to https://www.[email or web address].com.net/patiented  Enter I933 in the search box to learn more about \"Acute Sinusitis: Care Instructions.\"  Current as of: September 27, 2023               Content Version: 14.0    8642-9505 [employer].   Care instructions adapted under license by your healthcare professional. If you have questions about a medical condition or this instruction, always ask your healthcare professional. [employer] disclaims any warranty or liability for your use of this information.      Thank you for choosing us for your care. I have placed an order for a prescription so that you can start treatment. View your full visit summary for details by clicking on the link below. Your pharmacist will " able to address any questions you may have about the medication.     If you're not feeling better within 5-7 days, please schedule an appointment.  You can schedule an appointment right here in [facility], or call [phone]  If the visit is for the same symptoms as your eVisit, we'll refund the cost of your eVisit if seen within seven days.

## 2024-05-06 ENCOUNTER — TELEPHONE (OUTPATIENT)
Dept: FAMILY MEDICINE | Facility: CLINIC | Age: 64
End: 2024-05-06
Payer: COMMERCIAL

## 2024-05-06 NOTE — TELEPHONE ENCOUNTER
Patient Quality Outreach    Patient is due for the following:   Breast Cancer Screening - Mammogram    Next Steps:   Mammogram bus outrech    Type of outreach:    Phone, left message for patient/parent to call back.      Questions for provider review:    None           Lesley LEONE CC

## 2024-09-04 ENCOUNTER — E-VISIT (OUTPATIENT)
Dept: URGENT CARE | Facility: CLINIC | Age: 64
End: 2024-09-04
Payer: COMMERCIAL

## 2024-09-04 DIAGNOSIS — K13.79 MOUTH SORES: Primary | ICD-10-CM

## 2024-09-04 PROCEDURE — 99207 PR NON-BILLABLE SERV PER CHARTING: CPT | Performed by: EMERGENCY MEDICINE

## 2024-09-04 NOTE — PATIENT INSTRUCTIONS
Dear Zaira Ahn,    We are sorry you are not feeling well. Based on the responses you provided, it is recommended that you be seen in-person in urgent care so we can better evaluate your symptoms. Please click here to find the nearest urgent care location to you.   You will not be charged for this Visit. Thank you for trusting us with your care.    Carlos A Monge MD

## 2024-10-13 ENCOUNTER — HEALTH MAINTENANCE LETTER (OUTPATIENT)
Age: 64
End: 2024-10-13

## 2025-03-22 ENCOUNTER — HEALTH MAINTENANCE LETTER (OUTPATIENT)
Age: 65
End: 2025-03-22

## 2025-05-03 ENCOUNTER — HEALTH MAINTENANCE LETTER (OUTPATIENT)
Age: 65
End: 2025-05-03